# Patient Record
Sex: FEMALE | Race: BLACK OR AFRICAN AMERICAN | NOT HISPANIC OR LATINO | Employment: FULL TIME | URBAN - METROPOLITAN AREA
[De-identification: names, ages, dates, MRNs, and addresses within clinical notes are randomized per-mention and may not be internally consistent; named-entity substitution may affect disease eponyms.]

---

## 2017-01-12 ENCOUNTER — TELEPHONE (OUTPATIENT)
Dept: PHARMACY | Facility: CLINIC | Age: 47
End: 2017-01-12

## 2017-01-12 NOTE — TELEPHONE ENCOUNTER
Patient deferred QoL questionaiire due to time constraints  Orencia f/u complete. Name/ confirmed. Medication list reviewed. Consultation included: indication; goals of treatment; administration; storage and handling; side effects; how to handle side effects; the importance of compliance; how to handle missed doses; the importance of laboratory monitoring; the importance of keeping all follow up appointments. Patient understands to report any medication changes to [facility] and provider. All questions answered and addressed to patients satisfaction. Patient overall feels well however she did prefer the Humira. She still has some joint pain. Advised she is still relatively new to this medication and it can take some time for max benefit to be seen. She is having no side effects to note. She injects on .

## 2017-01-19 ENCOUNTER — PATIENT MESSAGE (OUTPATIENT)
Dept: RHEUMATOLOGY | Facility: CLINIC | Age: 47
End: 2017-01-19

## 2017-02-03 ENCOUNTER — OFFICE VISIT (OUTPATIENT)
Dept: RHEUMATOLOGY | Facility: CLINIC | Age: 47
End: 2017-02-03
Payer: COMMERCIAL

## 2017-02-03 VITALS
TEMPERATURE: 98 F | DIASTOLIC BLOOD PRESSURE: 92 MMHG | SYSTOLIC BLOOD PRESSURE: 142 MMHG | HEART RATE: 83 BPM | BODY MASS INDEX: 39.57 KG/M2 | HEIGHT: 63 IN | WEIGHT: 223.31 LBS

## 2017-02-03 DIAGNOSIS — D84.9 IMMUNOSUPPRESSION: ICD-10-CM

## 2017-02-03 DIAGNOSIS — R76.8 POSITIVE ANA (ANTINUCLEAR ANTIBODY): ICD-10-CM

## 2017-02-03 DIAGNOSIS — Z79.899 ENCOUNTER FOR LONG-TERM (CURRENT) USE OF OTHER MEDICATIONS: ICD-10-CM

## 2017-02-03 DIAGNOSIS — M05.79 RHEUMATOID ARTHRITIS INVOLVING MULTIPLE SITES WITH POSITIVE RHEUMATOID FACTOR: Primary | ICD-10-CM

## 2017-02-03 DIAGNOSIS — R53.83 FATIGUE, UNSPECIFIED TYPE: ICD-10-CM

## 2017-02-03 PROCEDURE — 3080F DIAST BP >= 90 MM HG: CPT | Mod: S$GLB,,, | Performed by: INTERNAL MEDICINE

## 2017-02-03 PROCEDURE — 99999 PR PBB SHADOW E&M-EST. PATIENT-LVL III: CPT | Mod: PBBFAC,,, | Performed by: INTERNAL MEDICINE

## 2017-02-03 PROCEDURE — 99214 OFFICE O/P EST MOD 30 MIN: CPT | Mod: S$GLB,,, | Performed by: INTERNAL MEDICINE

## 2017-02-03 PROCEDURE — 3077F SYST BP >= 140 MM HG: CPT | Mod: S$GLB,,, | Performed by: INTERNAL MEDICINE

## 2017-02-03 ASSESSMENT — ROUTINE ASSESSMENT OF PATIENT INDEX DATA (RAPID3)
PAIN SCORE: 2
MDHAQ FUNCTION SCORE: .1
PATIENT GLOBAL ASSESSMENT SCORE: .5
PSYCHOLOGICAL DISTRESS SCORE: 0
AM STIFFNESS SCORE: 0, NO
FATIGUE SCORE: 6
TOTAL RAPID3 SCORE: .94

## 2017-02-03 NOTE — PROGRESS NOTES
"Subjective:       Patient ID: Antonio Ruiz is a 46 y.o. female.    Chief Complaint: Rheumatoid Arthritis      HPI:  Antonio Ruiz is a 46 y.o. female with a history   of rheumatoid arthritis manifested by symmetrical small joint involvement,   history of elbow contracture on the right in the past, positive rheumatoid   factor, positive WILMA. Humira stopped due to rash and pain.  The patient currently takes Orencia, leflunomide 10 mg daily, prednisone 5 mg daily, sulfasalazine 1500 mg twice a day.       No pain today. Denies pain since stopping Humira and switching to Orencia she no longer has rash in ears and nodules or painful on the fingers.      Review of Systems   Constitutional: Positive for fatigue.   HENT: Negative.    Eyes: Negative.    Respiratory: Negative.    Cardiovascular: Negative.    Gastrointestinal: Negative.    Endocrine: Negative.    Genitourinary: Negative.    Musculoskeletal: Negative.    Skin: Negative.    Allergic/Immunologic: Negative.    Neurological: Negative.    Hematological: Negative.    Psychiatric/Behavioral: Negative.          Objective:     Visit Vitals    BP (!) 142/92 (BP Location: Right arm, Patient Position: Sitting, BP Method: Automatic)    Pulse 83    Temp 98.1 °F (36.7 °C) (Oral)    Ht 5' 3" (1.6 m)    Wt 101.3 kg (223 lb 4.8 oz)    BMI 39.56 kg/m2        Physical Exam   Constitutional: She is oriented to person, place, and time and well-developed, well-nourished, and in no distress.   HENT:   Head: Normocephalic and atraumatic.   Eyes: Conjunctivae and EOM are normal.   Neck: Neck supple.   Cardiovascular: Normal rate, regular rhythm and normal heart sounds.    Pulmonary/Chest: Effort normal and breath sounds normal.   Abdominal: Soft. Bowel sounds are normal.   Neurological: She is alert and oriented to person, place, and time. Gait normal.   Skin: Skin is warm and dry.     Hyperpigmentation on elbows   Psychiatric: Mood and affect normal. "   Musculoskeletal: She exhibits no edema, tenderness or deformity.   28 joint count: 0 swollen and 0 tender            Assessment:       1. RA.  Now improved off Humira and on Orencia, leflunomide and SSZ  2. Drug induced lupus.  Rash and Positive WILMA/SSA/ Smith  Plan:       1.  Labs   2.  Decrease prednisone 5 mg Sun/Tues/Thurs then stop.  3.  Continue Orencia, Leflunomide, SSZ.  4.  RTO 3 months/prn

## 2017-02-03 NOTE — MR AVS SNAPSHOT
[facility]  [address]  [facility] [address]  Phone: [phone]  Fax: [phone]                  Antonio Ruiz   2/3/2017 2:30 PM   Office Visit    Description:  Female : 1970   Provider:  Rosamaria Nicholson MD   Department:  [facility] - Rheumatology           Reason for Visit     Rheumatoid Arthritis           Diagnoses this Visit        Comments    Rheumatoid arthritis involving multiple sites with positive rheumatoid factor    -  Primary     Encounter for long-term (current) use of other medications         Fatigue, unspecified type         Immunosuppression         Positive WILMA (antinuclear antibody)                To Do List           Future Appointments        Provider Department Dept Phone    2017 2:30 PM Rosamaria Nicholson MD [facility] [phone]      Goals (5 Years of Data)     None      Follow-Up and Disposition     Return in about 3 months (around 5/3/2017).    Follow-up and Disposition History      [facility]     [facility] On Call Nurse [facility] Line -  Assistance  Registered nurses in the [facility] On Call Center provide clinical advisement, health education, appointment booking, and other advisory services.  Call for this free service at [phone].             Medications           Message regarding Medications     Verify the changes and/or additions to your medication regime listed below are the same as discussed with your clinician today.  If any of these changes or additions are incorrect, please notify your healthcare provider.             Verify that the below list of medications is an accurate representation of the medications you are currently taking.  If none reported, the list may be blank. If incorrect, please contact your healthcare provider. Carry this list with you in case of emergency.           Current Medications     abatacept (ORENCIA) 125 mg/mL Syrg Inject 125 mg into the skin every 7 days.     "amlodipine (NORVASC) 10 MG tablet Take 1 tablet (10 mg total) by mouth once daily.    ergocalciferol (ERGOCALCIFEROL) 50,000 unit Cap Take 1 capsule (50,000 Units total) by mouth every 7 days.    folic acid (FOLVITE) 1 MG tablet GIVE " 2 TABLETS BY MOUTH DAILY    ibuprofen (ADVIL,MOTRIN) 800 MG tablet TAKE 1 TABLET BY MOUTH THREE TIMES DAILY(EVERY 8 HOURS)    leflunomide (ARAVA) 10 MG Tab Take 1 tablet (10 mg total) by mouth once daily.    predniSONE (DELTASONE) 5 MG tablet TAKE 1 TO 2 TABLETS BY MOUTH DAILY AS NEEDED FOR RHEUMATOID ARTHRITIS FLARE** CALL OFFICE IF FLARE AS DIRECTED    sulfaSALAzine (AZULFIDINE) 500 mg Tab TAKE 3 TABLETS(1500 MG) BY MOUTH TWICE DAILY    triamcinolone acetonide 0.1% (KENALOG) 0.1 % cream AAA TWICE DAILY x 1-2 wks then prn flares only           Clinical Reference Information           Your Vitals Were     BP Pulse Temp Height Weight BMI    142/92 (BP Location: Right arm, Patient Position: Sitting, BP Method: Automatic) 83 98.1 °F (36.7 °C) (Oral) 5' 3" (1.6 m) 101.3 kg (223 lb 4.8 oz) 39.56 kg/m2      Blood Pressure          Most Recent Value    BP  (!)  142/92      Allergies as of 2/3/2017     Humira [Adalimumab]      Immunizations Administered on Date of Encounter - 2/3/2017     None      Orders Placed During Today's Visit     Future Labs/Procedures Expected by Expires    Anti-DNA antibody, double-stranded  2/3/2017 2/3/2018    C-reactive protein  2/3/2017 2/3/2018    C3 complement  2/3/2017 2/3/2018    C4 complement  2/3/2017 2/3/2018    CBC auto differential  2/3/2017 2/3/2018    Comprehensive metabolic panel  2/3/2017 2/3/2018    Sedimentation rate, manual  2/3/2017 2/3/2018    Urinalysis  2/3/2017 4/4/2018      Instructions    Decrease prednisone 5 mg Sun/Tues/Thurs then stop.       Language Assistance Services     ATTENTION: Language assistance services are available, free of charge. Please call [phone].      ATENCIÓN: Si habla español, tiene a landers disposición servicios " carmelos de asistencia lingüística. Asher garzon [phone].     CHAZ Ý: N?u b?n nói Ti?ng Vi?t, có các d?ch v? h? tr? ngôn ng? mi?n phí dành cho b?n. G?i s? [phone].         [facility] complies with applicable Federal civil rights laws and does not discriminate on the basis of race, color, national origin, age, disability, or sex.

## 2017-02-07 ENCOUNTER — TELEPHONE (OUTPATIENT)
Dept: PHARMACY | Facility: CLINIC | Age: 47
End: 2017-02-07

## 2017-02-14 ENCOUNTER — PATIENT MESSAGE (OUTPATIENT)
Dept: RHEUMATOLOGY | Facility: CLINIC | Age: 47
End: 2017-02-14

## 2017-02-14 DIAGNOSIS — M05.79 RHEUMATOID ARTHRITIS INVOLVING MULTIPLE SITES WITH POSITIVE RHEUMATOID FACTOR: ICD-10-CM

## 2017-02-15 DIAGNOSIS — M05.79 RHEUMATOID ARTHRITIS INVOLVING MULTIPLE SITES WITH POSITIVE RHEUMATOID FACTOR: ICD-10-CM

## 2017-02-15 RX ORDER — SULFASALAZINE 500 MG/1
TABLET ORAL
Qty: 180 TABLET | Refills: 0 | Status: SHIPPED | OUTPATIENT
Start: 2017-02-15 | End: 2017-04-10 | Stop reason: SDUPTHER

## 2017-02-16 RX ORDER — ABATACEPT 125 MG/ML
INJECTION, SOLUTION SUBCUTANEOUS
Qty: 4 ML | Refills: 2 | Status: SHIPPED | OUTPATIENT
Start: 2017-02-16 | End: 2017-05-10 | Stop reason: SDUPTHER

## 2017-02-21 ENCOUNTER — PATIENT MESSAGE (OUTPATIENT)
Dept: RHEUMATOLOGY | Facility: CLINIC | Age: 47
End: 2017-02-21

## 2017-02-21 DIAGNOSIS — M05.79 RHEUMATOID ARTHRITIS INVOLVING MULTIPLE SITES WITH POSITIVE RHEUMATOID FACTOR: ICD-10-CM

## 2017-02-22 RX ORDER — LEFLUNOMIDE 10 MG/1
10 TABLET ORAL DAILY
Qty: 30 TABLET | Refills: 2 | Status: SHIPPED | OUTPATIENT
Start: 2017-02-22 | End: 2017-05-29 | Stop reason: SDUPTHER

## 2017-03-10 ENCOUNTER — PATIENT MESSAGE (OUTPATIENT)
Dept: RHEUMATOLOGY | Facility: CLINIC | Age: 47
End: 2017-03-10

## 2017-03-13 ENCOUNTER — PATIENT MESSAGE (OUTPATIENT)
Dept: ADMINISTRATIVE | Facility: HOSPITAL | Age: 47
End: 2017-03-13

## 2017-03-14 ENCOUNTER — TELEPHONE (OUTPATIENT)
Dept: PHARMACY | Facility: CLINIC | Age: 47
End: 2017-03-14

## 2017-03-15 ENCOUNTER — PATIENT MESSAGE (OUTPATIENT)
Dept: RHEUMATOLOGY | Facility: CLINIC | Age: 47
End: 2017-03-15

## 2017-03-16 ENCOUNTER — TELEPHONE (OUTPATIENT)
Dept: PHARMACY | Facility: CLINIC | Age: 47
End: 2017-03-16

## 2017-03-17 ENCOUNTER — DOCUMENTATION ONLY (OUTPATIENT)
Dept: INTERNAL MEDICINE | Facility: CLINIC | Age: 47
End: 2017-03-17

## 2017-03-27 RX ORDER — AMLODIPINE BESYLATE 10 MG/1
TABLET ORAL
Qty: 90 TABLET | Refills: 0 | Status: SHIPPED | OUTPATIENT
Start: 2017-03-27 | End: 2017-06-01 | Stop reason: SDUPTHER

## 2017-03-27 RX ORDER — AMLODIPINE BESYLATE 10 MG/1
TABLET ORAL
Qty: 90 TABLET | Refills: 1 | Status: SHIPPED | OUTPATIENT
Start: 2017-03-27 | End: 2017-10-20

## 2017-04-10 DIAGNOSIS — M05.79 RHEUMATOID ARTHRITIS INVOLVING MULTIPLE SITES WITH POSITIVE RHEUMATOID FACTOR: ICD-10-CM

## 2017-04-10 RX ORDER — SULFASALAZINE 500 MG/1
TABLET ORAL
Qty: 180 TABLET | Refills: 0 | Status: SHIPPED | OUTPATIENT
Start: 2017-04-10 | End: 2017-06-08 | Stop reason: SDUPTHER

## 2017-04-12 ENCOUNTER — TELEPHONE (OUTPATIENT)
Dept: PHARMACY | Facility: CLINIC | Age: 47
End: 2017-04-12

## 2017-04-12 NOTE — TELEPHONE ENCOUNTER
Orencia f/u complete. Name/ confirmed. Medication list reviewed and updated. Consultation included: indication; goals of treatment; administration; storage and handling; side effects; how to handle side effects; the importance of compliance; how to handle missed doses; the importance of laboratory monitoring; the importance of keeping all follow up appointments. Patient understands to report any medication changes to [facility] and provider. All questions answered and addressed to patients satisfaction. Patient overall feels well however she does notice that the medication wears off a little quicker than Ji did. She injects on Sundays and she does feel about  that her pain levels increase. She cannot pinpoint a particular afflicted joint--sometimes shoulders sometimes hands, etc. SHe has no questions regarding the self injection process. She reports strict compliance and has missed no doses. No side effects no med changes. Will f/u in 3mos to reassess symptoms and medication efficacy.

## 2017-04-19 ENCOUNTER — PATIENT MESSAGE (OUTPATIENT)
Dept: RHEUMATOLOGY | Facility: CLINIC | Age: 47
End: 2017-04-19

## 2017-04-19 DIAGNOSIS — M05.79 RHEUMATOID ARTHRITIS INVOLVING MULTIPLE SITES WITH POSITIVE RHEUMATOID FACTOR: ICD-10-CM

## 2017-04-19 RX ORDER — PREDNISONE 5 MG/1
TABLET ORAL
Qty: 60 TABLET | Refills: 0 | Status: SHIPPED | OUTPATIENT
Start: 2017-04-19 | End: 2017-05-18 | Stop reason: SDUPTHER

## 2017-04-26 ENCOUNTER — PATIENT MESSAGE (OUTPATIENT)
Dept: RHEUMATOLOGY | Facility: CLINIC | Age: 47
End: 2017-04-26

## 2017-05-01 ENCOUNTER — PATIENT MESSAGE (OUTPATIENT)
Dept: RHEUMATOLOGY | Facility: CLINIC | Age: 47
End: 2017-05-01

## 2017-05-01 DIAGNOSIS — R53.83 FATIGUE, UNSPECIFIED TYPE: ICD-10-CM

## 2017-05-01 DIAGNOSIS — L93.2 DRUG-INDUCED LUPUS ERYTHEMATOSUS: ICD-10-CM

## 2017-05-01 DIAGNOSIS — T50.905A DRUG-INDUCED LUPUS ERYTHEMATOSUS: ICD-10-CM

## 2017-05-01 DIAGNOSIS — D84.9 IMMUNOSUPPRESSION: ICD-10-CM

## 2017-05-01 DIAGNOSIS — E55.9 VITAMIN D DEFICIENCY DISEASE: ICD-10-CM

## 2017-05-01 DIAGNOSIS — M05.79 RHEUMATOID ARTHRITIS INVOLVING MULTIPLE SITES WITH POSITIVE RHEUMATOID FACTOR: Primary | ICD-10-CM

## 2017-05-01 NOTE — TELEPHONE ENCOUNTER
Orencia did not work when she injected in abdomen and it did not help.  Also now with bald spot.  She did not increase steroid.  Restart Orencia injecting legs.  Work on switch to Xeljanz (after labs) since Orencia not adequately controlling symptoms and she has to take prednisone.    Currently she is taking prednisone 10 mg, Orencia, leuflunomide and SSZ.  Will repeat labs with cholesterol and quatiferon TB since will need this (to be done outside [facility] due to insurance).  Will mail lab orders.

## 2017-05-04 ENCOUNTER — PATIENT MESSAGE (OUTPATIENT)
Dept: RHEUMATOLOGY | Facility: CLINIC | Age: 47
End: 2017-05-04

## 2017-05-10 DIAGNOSIS — M05.79 RHEUMATOID ARTHRITIS INVOLVING MULTIPLE SITES WITH POSITIVE RHEUMATOID FACTOR: ICD-10-CM

## 2017-05-11 RX ORDER — ABATACEPT 125 MG/ML
INJECTION, SOLUTION SUBCUTANEOUS
Qty: 4 ML | Refills: 1 | Status: SHIPPED | OUTPATIENT
Start: 2017-05-11 | End: 2017-08-31 | Stop reason: ALTCHOICE

## 2017-05-18 ENCOUNTER — OFFICE VISIT (OUTPATIENT)
Dept: RHEUMATOLOGY | Facility: CLINIC | Age: 47
End: 2017-05-18
Payer: COMMERCIAL

## 2017-05-18 VITALS
BODY MASS INDEX: 40.49 KG/M2 | HEART RATE: 73 BPM | SYSTOLIC BLOOD PRESSURE: 151 MMHG | DIASTOLIC BLOOD PRESSURE: 96 MMHG | WEIGHT: 228.63 LBS | TEMPERATURE: 98 F

## 2017-05-18 DIAGNOSIS — T50.905A DRUG-INDUCED LUPUS ERYTHEMATOSUS: ICD-10-CM

## 2017-05-18 DIAGNOSIS — E66.01 MORBID OBESITY, UNSPECIFIED OBESITY TYPE: ICD-10-CM

## 2017-05-18 DIAGNOSIS — L65.9 ALOPECIA: ICD-10-CM

## 2017-05-18 DIAGNOSIS — L93.2 DRUG-INDUCED LUPUS ERYTHEMATOSUS: ICD-10-CM

## 2017-05-18 DIAGNOSIS — M05.79 RHEUMATOID ARTHRITIS INVOLVING MULTIPLE SITES WITH POSITIVE RHEUMATOID FACTOR: Primary | ICD-10-CM

## 2017-05-18 DIAGNOSIS — R53.83 FATIGUE, UNSPECIFIED TYPE: ICD-10-CM

## 2017-05-18 DIAGNOSIS — D84.9 IMMUNOSUPPRESSION: ICD-10-CM

## 2017-05-18 PROCEDURE — 3077F SYST BP >= 140 MM HG: CPT | Mod: S$GLB,,, | Performed by: INTERNAL MEDICINE

## 2017-05-18 PROCEDURE — 1160F RVW MEDS BY RX/DR IN RCRD: CPT | Mod: S$GLB,,, | Performed by: INTERNAL MEDICINE

## 2017-05-18 PROCEDURE — 3080F DIAST BP >= 90 MM HG: CPT | Mod: S$GLB,,, | Performed by: INTERNAL MEDICINE

## 2017-05-18 PROCEDURE — 99214 OFFICE O/P EST MOD 30 MIN: CPT | Mod: S$GLB,,, | Performed by: INTERNAL MEDICINE

## 2017-05-18 PROCEDURE — 99999 PR PBB SHADOW E&M-EST. PATIENT-LVL III: CPT | Mod: PBBFAC,,, | Performed by: INTERNAL MEDICINE

## 2017-05-18 RX ORDER — PREDNISONE 5 MG/1
TABLET ORAL
Qty: 60 TABLET | Refills: 1 | Status: SHIPPED | OUTPATIENT
Start: 2017-05-18 | End: 2017-07-18 | Stop reason: SDUPTHER

## 2017-05-18 ASSESSMENT — ROUTINE ASSESSMENT OF PATIENT INDEX DATA (RAPID3)
AM STIFFNESS SCORE: 0, NO
MDHAQ FUNCTION SCORE: .3
PATIENT GLOBAL ASSESSMENT SCORE: 2
PSYCHOLOGICAL DISTRESS SCORE: 0
PAIN SCORE: 3
TOTAL RAPID3 SCORE: 2
FATIGUE SCORE: 6

## 2017-05-18 NOTE — MR AVS SNAPSHOT
[facility]  [address] [facility] [address]  Phone: [phone]  Fax: [phone]                  Antonio Ruiz   2017 2:30 PM   Office Visit    Description:  Female : 1970   Provider:  Rosamaria Nicholson MD   Department:  [facility]           Reason for Visit     Rheumatoid Arthritis           Diagnoses this Visit        Comments    Rheumatoid arthritis involving multiple sites with positive rheumatoid factor    -  Primary     Drug-induced lupus erythematosus         Immunosuppression         Fatigue, unspecified type         Alopecia         Morbid obesity, unspecified obesity type                To Do List           Future Appointments        Provider Department Dept Phone    2017 2:30 PM Rosamaria Nicholson MD [facility] [phone]      Goals (5 Years of Data)     None      Follow-Up and Disposition     Return in about 3 months (around 2017).    Follow-up and Disposition History       These Medications        Disp Refills Start End    predniSONE (DELTASONE) 5 MG tablet 60 tablet 1 2017     TAKE 1 TO 2 TABLETS BY MOUTH DAILY AS NEEDED FOR RHEUMATOID ARTHRITIS FLARE** CALL OFFICE IF FLARE AS DIRECTED    Pharmacy: [facility] [address] [place] [address] [address] [facility] Ph #: [phone]         [facility]     [facility] Nurse Care Line -  Assistance  Unless otherwise directed by your provider, please contact [facility], our nurse care line that is available for  assistance.     Registered nurses in the [facility] On Call Center provide: appointment scheduling, clinical advisement, health education, and other advisory services.  Call: [phone] (toll free)               Medications           Message regarding Medications     Verify the changes and/or additions to your medication regime listed below are the same as discussed with your  clinician today.  If any of these changes or additions are incorrect, please notify your healthcare provider.             Verify that the below list of medications is an accurate representation of the medications you are currently taking.  If none reported, the list may be blank. If incorrect, please contact your healthcare provider. Carry this list with you in case of emergency.           Current Medications     amlodipine (NORVASC) 10 MG tablet TAKE 1 TABLET(10 MG) BY MOUTH EVERY DAY    amlodipine (NORVASC) 10 MG tablet TAKE 1 TABLET(10 MG) BY MOUTH EVERY DAY    ergocalciferol (ERGOCALCIFEROL) 50,000 unit Cap Take 1 capsule (50,000 Units total) by mouth every 7 days.    folic acid (FOLVITE) 1 MG tablet GIVE " 2 TABLETS BY MOUTH DAILY    ibuprofen (ADVIL,MOTRIN) 800 MG tablet TAKE 1 TABLET BY MOUTH THREE TIMES DAILY(EVERY 8 HOURS)    leflunomide (ARAVA) 10 MG Tab Take 1 tablet (10 mg total) by mouth once daily.    ORENCIA 125 mg/mL Syrg INJECT 125MG INTO THE SKIN EVERY 7 DAYS    predniSONE (DELTASONE) 5 MG tablet TAKE 1 TO 2 TABLETS BY MOUTH DAILY AS NEEDED FOR RHEUMATOID ARTHRITIS FLARE** CALL OFFICE IF FLARE AS DIRECTED    sulfaSALAzine (AZULFIDINE) 500 mg Tab TAKE 3 TABLETS(1500 MG) BY MOUTH TWICE DAILY    triamcinolone acetonide 0.1% (KENALOG) 0.1 % cream AAA TWICE DAILY x 1-2 wks then prn flares only           Clinical Reference Information           Your Vitals Were     BP Pulse Temp Weight Last Period BMI    151/96 (BP Location: Left arm, Patient Position: Sitting, BP Method: Automatic) 73 98.4 °F (36.9 °C) (Oral) 103.7 kg (228 lb 9.6 oz) 05/12/2017 40.49 kg/m2      Blood Pressure          Most Recent Value    BP  (!)  151/96      Allergies as of 5/18/2017     Humira [Adalimumab]      Immunizations Administered on Date of Encounter - 5/18/2017     None      Orders Placed During Today's Visit     Future Labs/Procedures Expected by Expires    Anti-DNA antibody, double-stranded  5/18/2017 5/18/2018     C-reactive protein  5/18/2017 5/18/2018    C3 complement  5/18/2017 5/18/2018    C4 complement  5/18/2017 5/18/2018    CBC auto differential  5/18/2017 5/18/2018    Comprehensive metabolic panel  5/18/2017 5/18/2018    Protein / creatinine ratio, urine  5/18/2017 7/17/2018    Sedimentation rate, manual  5/18/2017 5/18/2018    Urinalysis  5/18/2017 7/17/2018      Language Assistance Services     ATTENTION: Language assistance services are available, free of charge. Please call [phone].      ATENCIÓN: Si habla español, tiene a landers disposición servicios gratuitos de asistencia lingüística. Llame al [phone].     CHÚ Ý: N?u b?n nói Ti?ng Vi?t, có các d?ch v? h? tr? ngôn ng? mi?n phí dành cho b?n. G?i s? [phone].         [facility] complies with applicable Federal civil rights laws and does not discriminate on the basis of race, color, national origin, age, disability, or sex.

## 2017-05-18 NOTE — PROGRESS NOTES
Subjective:       Patient ID: Antonio Ruiz is a 47 y.o. female.    Chief Complaint: Rheumatoid Arthritis      HPI:  Antonio Ruiz is a 47 y.o. female with a history   of rheumatoid arthritis manifested by symmetrical small joint involvement,   history of elbow contracture on the right in the past, positive rheumatoid   factor, positive WILMA. Humira stopped due to rash and pain.  The patient currently takes Orencia, leflunomide 10 mg daily, prednisone 5 mg daily, sulfasalazine 1500 mg twice a day (misses in evening).       No pain today.   She is on prednisone 10 mg daily.  She had pain increase pain 2-3 days after taking Orencia.   She has been losing hair.      Review of Systems   Constitutional: Positive for fatigue.   HENT: Negative.    Eyes: Negative.    Respiratory: Negative.    Cardiovascular: Negative.    Gastrointestinal: Negative.    Endocrine: Negative.    Genitourinary: Negative.    Musculoskeletal: Positive for arthralgias.   Skin: Positive for rash.   Allergic/Immunologic: Negative.    Neurological: Negative.    Hematological: Negative.    Psychiatric/Behavioral: Negative.          Objective:   BP (!) 151/96 (BP Location: Left arm, Patient Position: Sitting, BP Method: Automatic)  Pulse 73  Temp 98.4 °F (36.9 °C) (Oral)   Wt 103.7 kg (228 lb 9.6 oz)  LMP 05/12/2017  BMI 40.49 kg/m2     Physical Exam   Constitutional: She is oriented to person, place, and time and well-developed, well-nourished, and in no distress.   HENT:   Head: Normocephalic and atraumatic.   Eyes: Conjunctivae and EOM are normal.   Neck: Neck supple.   Cardiovascular: Normal rate, regular rhythm and normal heart sounds.    Pulmonary/Chest: Effort normal and breath sounds normal.   Abdominal: Soft. Bowel sounds are normal.   Neurological: She is alert and oriented to person, place, and time. Gait normal.   Skin: Skin is warm and dry.     Hair loss left front  Hyperpigmentation of elbows and in ears   Psychiatric: Mood  and affect normal.   Musculoskeletal: She exhibits no edema, tenderness or deformity.   28 joint count: 0 swollen and 0 tender            LABS  5/6/17 ESR 20, CRP3  Assessment:       1. RA. Now off Orencia due to pain.  She is on prednisone 10 mg, leflunomide and SSZ.  She has positive  2. Drug induced lupus. Rash and Positive WILMA/SSA/ Matthew.  Now with +DS DNA and normal complements.  Repeat labs in one month.   3. Alopecia  4. Increase ETOH intake.  Due to stress and joint pain.  1-2 drinks a day.  She does not feel like she should cut back.      Plan:       1. Decrease prednisone 5 mg daily.  2. Continue leflunomide and SSZ  3. Hold Orencia since caused more pain 2-3 days after shot.  4. Labs in one month.  5. Patient to follow with dermatology regarding alopecia and rash. Patient not interested in biopsy to evaluate for lupus since she keloids.  6. Consider retrying Plaquenil in efforts to prevent progression.  In past had thinning of hair with Plaquenil.  7. Rituximab hand out given  RTO 3 months/prn

## 2017-05-28 ENCOUNTER — PATIENT MESSAGE (OUTPATIENT)
Dept: RHEUMATOLOGY | Facility: CLINIC | Age: 47
End: 2017-05-28

## 2017-05-28 ENCOUNTER — PATIENT MESSAGE (OUTPATIENT)
Dept: DERMATOLOGY | Facility: CLINIC | Age: 47
End: 2017-05-28

## 2017-05-28 DIAGNOSIS — M05.79 RHEUMATOID ARTHRITIS INVOLVING MULTIPLE SITES WITH POSITIVE RHEUMATOID FACTOR: ICD-10-CM

## 2017-05-29 DIAGNOSIS — M05.79 RHEUMATOID ARTHRITIS INVOLVING MULTIPLE SITES WITH POSITIVE RHEUMATOID FACTOR: ICD-10-CM

## 2017-05-29 RX ORDER — LEFLUNOMIDE 10 MG/1
TABLET ORAL
Qty: 30 TABLET | Refills: 0 | OUTPATIENT
Start: 2017-05-29

## 2017-05-29 RX ORDER — LEFLUNOMIDE 10 MG/1
TABLET ORAL
Qty: 30 TABLET | Refills: 0 | Status: SHIPPED | OUTPATIENT
Start: 2017-05-29 | End: 2017-06-29 | Stop reason: SDUPTHER

## 2017-06-01 ENCOUNTER — OFFICE VISIT (OUTPATIENT)
Dept: DERMATOLOGY | Facility: CLINIC | Age: 47
End: 2017-06-01
Payer: COMMERCIAL

## 2017-06-01 DIAGNOSIS — L30.9 DERMATITIS: Primary | ICD-10-CM

## 2017-06-01 DIAGNOSIS — L65.9 ALOPECIA, UNSPECIFIED: ICD-10-CM

## 2017-06-01 PROCEDURE — 99214 OFFICE O/P EST MOD 30 MIN: CPT | Mod: 25,S$GLB,, | Performed by: DERMATOLOGY

## 2017-06-01 PROCEDURE — 88305 TISSUE EXAM BY PATHOLOGIST: CPT | Performed by: PATHOLOGY

## 2017-06-01 PROCEDURE — 11100 PR BIOPSY OF SKIN LESION: CPT | Mod: S$GLB,,, | Performed by: DERMATOLOGY

## 2017-06-01 PROCEDURE — 99999 PR PBB SHADOW E&M-EST. PATIENT-LVL II: CPT | Mod: PBBFAC,,, | Performed by: DERMATOLOGY

## 2017-06-01 RX ORDER — FLUOCINONIDE TOPICAL SOLUTION USP, 0.05% 0.5 MG/ML
SOLUTION TOPICAL
Qty: 60 ML | Refills: 3 | Status: SHIPPED | OUTPATIENT
Start: 2017-06-01 | End: 2019-04-25

## 2017-06-01 RX ORDER — KETOCONAZOLE 20 MG/ML
SHAMPOO, SUSPENSION TOPICAL
Qty: 120 ML | Refills: 5 | Status: SHIPPED | OUTPATIENT
Start: 2017-06-01 | End: 2019-04-25

## 2017-06-01 NOTE — PROGRESS NOTES
Subjective:       Patient ID:  Antonio Ruiz is a 47 y.o. female who presents for   Chief Complaint   Patient presents with    Rash     poss. lupus     Pt presents for consult from rheum for poss. Cutaneous Lupus- she was referred by dr tristan.    Has hair loss left temple area x at least one month.  Also has rashes on bilateral hips, arms and back.  X unsure. Itchy.  tx with nothing,   Pt does have hx of keloid scarring so she is concerned about a bx.    Pt has hx of rash /lupus like reaction to humira.  She has been off orencia and is now on sulfasalazine.   Heat makes lesions more sensitive, mildly itchy.       Rash         Review of Systems   Constitutional: Positive for fatigue. Negative for fever, chills and malaise.   HENT: Negative for mouth sores.    Musculoskeletal: Negative for joint swelling and arthralgias.   Skin: Positive for rash and daily sunscreen use.        Objective:    Physical Exam   Constitutional: She appears well-developed and well-nourished. No distress.   Neurological: She is alert and oriented to person, place, and time. She is not disoriented.   Psychiatric: She has a normal mood and affect.   Skin:   Areas Examined (abnormalities noted in diagram):   Scalp / Hair Palpated and Inspected  Head / Face Inspection Performed  Neck Inspection Performed  Chest / Axilla Inspection Performed  Abdomen Inspection Performed  Back Inspection Performed  RUE Inspected  LUE Inspection Performed  Nails and Digits Inspection Performed                   Diagram Legend     Erythematous scaling macule/papule c/w actinic keratosis       Vascular papule c/w angioma      Pigmented verrucoid papule/plaque c/w seborrheic keratosis      Yellow umbilicated papule c/w sebaceous hyperplasia      Irregularly shaped tan macule c/w lentigo     1-2 mm smooth white papules consistent with Milia      Movable subcutaneous cyst with punctum c/w epidermal inclusion cyst      Subcutaneous movable cyst c/w pilar cyst       Firm pink to brown papule c/w dermatofibroma      Pedunculated fleshy papule(s) c/w skin tag(s)      Evenly pigmented macule c/w junctional nevus     Mildly variegated pigmented, slightly irregular-bordered macule c/w mildly atypical nevus      Flesh colored to evenly pigmented papule c/w intradermal nevus       Pink pearly papule/plaque c/w basal cell carcinoma      Erythematous hyperkeratotic cursted plaque c/w SCC      Surgical scar with no sign of skin cancer recurrence      Open and closed comedones      Inflammatory papules and pustules      Verrucoid papule consistent consistent with wart     Erythematous eczematous patches and plaques     Dystrophic onycholytic nail with subungual debris c/w onychomycosis     Umbilicated papule    Erythematous-base heme-crusted tan verrucoid plaque consistent with inflamed seborrheic keratosis     Erythematous Silvery Scaling Plaque c/w Psoriasis     See annotation      Assessment / Plan:      Pathology Orders:      Normal Orders This Visit    Tissue Specimen To Pathology, Dermatology     Questions:    Directional Terms:  Other(comment)    Clinical information:  r/o lupus    Specific Site:  Right  hip    Tissue Specimen To Pathology, Dermatology     Comments:    Please do direct immunoflourescence    Questions:    Directional Terms:  Other(comment)    Clinical information:  r/o lupus- DIRECT IMMUNOFLOURESCENCE    Specific Site:  Left  hip Comment - please do direct immunofloresence        Dermatitis  Punch biopsy procedure note: x 2, h and e and DIF  Punch biopsy performed after verbal consent obtained. Area marked and prepped with alcohol. Approximately 1cc of 1% lidocaine with epinephrine injected. 4 mm disposable punch used to remove lesion. Hemostasis obtained and biopsy site closed with 1 - 2 Prolene sutures. Wound care instructions reviewed with patient and handout given.    -     Tissue Specimen To Pathology, Dermatology  -     Tissue Specimen To Pathology,  Dermatology  Triamcinolone 0.1% ointment bid     Alopecia, unspecified  -     fluocinonide (LIDEX) 0.05 % external solution; AAA scalp qd - bid prn pruritus  Dispense: 60 mL; Refill: 3  -     ketoconazole (NIZORAL) 2 % shampoo; Wash hair with medicated shampoo at least 2x/week - let sit on scalp at least 5 minutes prior to rinsing  Dispense: 120 mL; Refill: 5  Qilib   Consider ILK            Return in about 2 weeks (around 6/15/2017).

## 2017-06-01 NOTE — LETTER
June 1, 2017      Rosamaria Nicholson MD  [address]  [place] [address]           [place] [facility]atology  [address] [facility]  [place] [address]  Phone: [phone]  Fax: [phone]          Patient: Antonio Ruiz   MR Number: [id number]   YOB: 1970   Date of Visit: 6/1/2017       Dear Dr. Rosamaria Nicholson:    Thank you for referring Antonio Ruiz to me for evaluation. Attached you will find relevant portions of my assessment and plan of care.    If you have questions, please do not hesitate to call me. I look forward to following Antonio Ruiz along with you.    Sincerely,    Sharri Maxwell MD    Enclosure  CC:  No Recipients    If you would like to receive this communication electronically, please contact [email or web address] or [phone] to request more information on [employer] access.    For providers and/or their staff who would like to refer a patient to [facility], please contact us through our one-stop-shop provider referral line, [facility] [place], at [phone].    If you feel you have received this communication in error or would no longer like to receive these types of communications, please e-mail [email or web address]

## 2017-06-07 ENCOUNTER — PATIENT MESSAGE (OUTPATIENT)
Dept: RHEUMATOLOGY | Facility: CLINIC | Age: 47
End: 2017-06-07

## 2017-06-07 DIAGNOSIS — M05.79 RHEUMATOID ARTHRITIS INVOLVING MULTIPLE SITES WITH POSITIVE RHEUMATOID FACTOR: ICD-10-CM

## 2017-06-08 DIAGNOSIS — N76.0 ACUTE VAGINITIS: ICD-10-CM

## 2017-06-08 RX ORDER — FLUCONAZOLE 150 MG/1
TABLET ORAL
Qty: 1 TABLET | Refills: 0 | Status: SHIPPED | OUTPATIENT
Start: 2017-06-08 | End: 2017-12-11

## 2017-06-08 RX ORDER — SULFASALAZINE 500 MG/1
TABLET ORAL
Qty: 180 TABLET | Refills: 2 | Status: SHIPPED | OUTPATIENT
Start: 2017-06-08 | End: 2017-10-16 | Stop reason: SDUPTHER

## 2017-06-15 ENCOUNTER — OFFICE VISIT (OUTPATIENT)
Dept: DERMATOLOGY | Facility: CLINIC | Age: 47
End: 2017-06-15
Payer: COMMERCIAL

## 2017-06-15 VITALS — BODY MASS INDEX: 40.39 KG/M2 | WEIGHT: 228 LBS

## 2017-06-15 DIAGNOSIS — L93.2 CUTANEOUS LUPUS ERYTHEMATOSUS: Primary | ICD-10-CM

## 2017-06-15 PROCEDURE — 99999 PR PBB SHADOW E&M-EST. PATIENT-LVL II: CPT | Mod: PBBFAC,,, | Performed by: DERMATOLOGY

## 2017-06-15 PROCEDURE — 99213 OFFICE O/P EST LOW 20 MIN: CPT | Mod: S$GLB,,, | Performed by: DERMATOLOGY

## 2017-06-15 RX ORDER — CLOBETASOL PROPIONATE 0.5 MG/G
AEROSOL, FOAM TOPICAL 2 TIMES DAILY
Qty: 50 G | Refills: 1 | Status: SHIPPED | OUTPATIENT
Start: 2017-06-15 | End: 2019-04-25

## 2017-06-15 NOTE — PROGRESS NOTES
Subjective:       Patient ID:  Antonio Ruiz is a 47 y.o. female who presents for   Chief Complaint   Patient presents with    Follow-up     Pt here for f/u on rash. Feels she has a new area on right arm and more lesions on B hips.  Right arm itches, hips asymptomatic.    No mouth ulcers  Stopped MTX bc switched to humira.    Pt was on humira and got a lupus like syndrome. Has been on arava , prednisone 5 mg daily, sulfasalazine,  Stopped Orencia bc felt worse on it.    Was on plaquenil and felt her hair was falling out.          Review of Systems   Constitutional: Negative for fever and chills.   HENT: Negative for mouth sores.    Musculoskeletal: Negative for joint swelling and arthralgias.   Skin: Positive for itching and rash.        Objective:    Physical Exam   Constitutional: She appears well-developed and well-nourished. No distress.   Neurological: She is alert and oriented to person, place, and time. She is not disoriented.   Psychiatric: She has a normal mood and affect.   Skin:   Areas Examined (abnormalities noted in diagram):   RUE Inspected  LUE Inspection Performed  RLE Inspected  LLE Inspection Performed              Diagram Legend     Erythematous scaling macule/papule c/w actinic keratosis       Vascular papule c/w angioma      Pigmented verrucoid papule/plaque c/w seborrheic keratosis      Yellow umbilicated papule c/w sebaceous hyperplasia      Irregularly shaped tan macule c/w lentigo     1-2 mm smooth white papules consistent with Milia      Movable subcutaneous cyst with punctum c/w epidermal inclusion cyst      Subcutaneous movable cyst c/w pilar cyst      Firm pink to brown papule c/w dermatofibroma      Pedunculated fleshy papule(s) c/w skin tag(s)      Evenly pigmented macule c/w junctional nevus     Mildly variegated pigmented, slightly irregular-bordered macule c/w mildly atypical nevus      Flesh colored to evenly pigmented papule c/w intradermal nevus       Pink pearly  papule/plaque c/w basal cell carcinoma      Erythematous hyperkeratotic cursted plaque c/w SCC      Surgical scar with no sign of skin cancer recurrence      Open and closed comedones      Inflammatory papules and pustules      Verrucoid papule consistent consistent with wart     Erythematous eczematous patches and plaques     Dystrophic onycholytic nail with subungual debris c/w onychomycosis     Umbilicated papule    Erythematous-base heme-crusted tan verrucoid plaque consistent with inflamed seborrheic keratosis     Erythematous Silvery Scaling Plaque c/w Psoriasis     See annotation      Assessment / Plan:        Cutaneous lupus erythematosus  IIF negative however H and E consistent with CTD.   Pt was on plaquenil in the past and had hair loss, however this could have been just related to her underlying condition not the medication  She will discuss consideration of restarting with Rheum  Will use potent topical steroids and see if this helps  Sun protection   Consider protopic as steroid sparing agent  Pt educated that overuse of steroids can lead to skin thinning/atrophy, hypopigmentation, striae.    Other orders  -     clobetasol (OLUX) 0.05 % Foam; Apply topically 2 (two) times daily. aaa bid prn flare.avoid use on face or groin  Dispense: 50 g; Refill: 1             Return in about 2 months (around 8/15/2017).

## 2017-06-16 ENCOUNTER — TELEPHONE (OUTPATIENT)
Dept: RHEUMATOLOGY | Facility: CLINIC | Age: 47
End: 2017-06-16

## 2017-06-16 NOTE — TELEPHONE ENCOUNTER
----- Message from Huma Maxwell MD sent at 6/8/2017 11:48 AM CDT -----  See path report. Thanks, huma

## 2017-06-16 NOTE — TELEPHONE ENCOUNTER
Path shows connective tissue disease dermatomyositis or lupus but the immunofluorescence did not confirm lupus.  Will avoid TNF inhibitors due to concern for drug induced lupus. Dermatology agrees with Plaquenil.  Patient agreed to retry.  She will get Plaquenil eye exam with her doctor outside [facility] once things settle since her father  recently.  She is currently on prednisone 10 mg, leflunomide and SSZ and feels well.  Will continue current regimen.

## 2017-06-28 ENCOUNTER — PATIENT MESSAGE (OUTPATIENT)
Dept: RHEUMATOLOGY | Facility: CLINIC | Age: 47
End: 2017-06-28

## 2017-06-28 DIAGNOSIS — L93.2 DRUG-INDUCED LUPUS ERYTHEMATOSUS: ICD-10-CM

## 2017-06-28 DIAGNOSIS — M05.79 RHEUMATOID ARTHRITIS INVOLVING MULTIPLE SITES WITH POSITIVE RHEUMATOID FACTOR: Primary | ICD-10-CM

## 2017-06-28 DIAGNOSIS — T50.905A DRUG-INDUCED LUPUS ERYTHEMATOSUS: ICD-10-CM

## 2017-06-28 DIAGNOSIS — Z79.899 ENCOUNTER FOR LONG-TERM (CURRENT) USE OF OTHER MEDICATIONS: ICD-10-CM

## 2017-06-28 DIAGNOSIS — R76.8 POSITIVE ANA (ANTINUCLEAR ANTIBODY): ICD-10-CM

## 2017-06-29 ENCOUNTER — PATIENT MESSAGE (OUTPATIENT)
Dept: RHEUMATOLOGY | Facility: CLINIC | Age: 47
End: 2017-06-29

## 2017-06-29 DIAGNOSIS — M05.79 RHEUMATOID ARTHRITIS INVOLVING MULTIPLE SITES WITH POSITIVE RHEUMATOID FACTOR: ICD-10-CM

## 2017-06-29 RX ORDER — LEFLUNOMIDE 10 MG/1
TABLET ORAL
Qty: 30 TABLET | Refills: 0 | OUTPATIENT
Start: 2017-06-29

## 2017-06-29 RX ORDER — LEFLUNOMIDE 10 MG/1
TABLET ORAL
Qty: 30 TABLET | Refills: 0 | Status: SHIPPED | OUTPATIENT
Start: 2017-06-29 | End: 2017-07-18 | Stop reason: SDUPTHER

## 2017-06-29 RX ORDER — AMLODIPINE BESYLATE 10 MG/1
TABLET ORAL
Qty: 90 TABLET | Refills: 2 | Status: SHIPPED | OUTPATIENT
Start: 2017-06-29 | End: 2017-09-22

## 2017-06-30 ENCOUNTER — PATIENT MESSAGE (OUTPATIENT)
Dept: RHEUMATOLOGY | Facility: CLINIC | Age: 47
End: 2017-06-30

## 2017-07-05 ENCOUNTER — PATIENT MESSAGE (OUTPATIENT)
Dept: RHEUMATOLOGY | Facility: CLINIC | Age: 47
End: 2017-07-05

## 2017-07-05 RX ORDER — HYDROXYCHLOROQUINE SULFATE 200 MG/1
200 TABLET, FILM COATED ORAL 2 TIMES DAILY
Qty: 60 TABLET | Refills: 2 | Status: SHIPPED | OUTPATIENT
Start: 2017-07-05 | End: 2017-08-04

## 2017-07-18 ENCOUNTER — PATIENT MESSAGE (OUTPATIENT)
Dept: RHEUMATOLOGY | Facility: CLINIC | Age: 47
End: 2017-07-18

## 2017-07-18 DIAGNOSIS — M05.79 RHEUMATOID ARTHRITIS INVOLVING MULTIPLE SITES WITH POSITIVE RHEUMATOID FACTOR: ICD-10-CM

## 2017-07-18 RX ORDER — LEFLUNOMIDE 20 MG/1
TABLET ORAL
Qty: 30 TABLET | Refills: 2 | Status: SHIPPED | OUTPATIENT
Start: 2017-07-18 | End: 2017-10-16 | Stop reason: SDUPTHER

## 2017-07-18 RX ORDER — PREDNISONE 5 MG/1
TABLET ORAL
Qty: 60 TABLET | Refills: 1 | Status: SHIPPED | OUTPATIENT
Start: 2017-07-18 | End: 2017-09-26 | Stop reason: SDUPTHER

## 2017-07-27 ENCOUNTER — PATIENT MESSAGE (OUTPATIENT)
Dept: RHEUMATOLOGY | Facility: CLINIC | Age: 47
End: 2017-07-27

## 2017-07-27 DIAGNOSIS — M05.79 RHEUMATOID ARTHRITIS INVOLVING MULTIPLE SITES WITH POSITIVE RHEUMATOID FACTOR: ICD-10-CM

## 2017-07-27 DIAGNOSIS — T50.905A DRUG-INDUCED LUPUS ERYTHEMATOSUS: ICD-10-CM

## 2017-07-27 DIAGNOSIS — R76.8 POSITIVE ANA (ANTINUCLEAR ANTIBODY): ICD-10-CM

## 2017-07-27 DIAGNOSIS — R80.9 PROTEINURIA, UNSPECIFIED TYPE: Primary | ICD-10-CM

## 2017-07-27 DIAGNOSIS — L93.2 DRUG-INDUCED LUPUS ERYTHEMATOSUS: ICD-10-CM

## 2017-07-28 PROBLEM — R80.9 PROTEINURIA: Status: ACTIVE | Noted: 2017-07-28

## 2017-07-28 NOTE — TELEPHONE ENCOUNTER
Informed patient of labs.  She is currently taking Plaquenil, prednisone 10 mg, SSZ and leflunomide.  Labs show improvement in dsDNA from 25 at [facility] to normal <1.  With 2+ protein on urine.  ESR increase.  She still has joint pains.  Consult nephroplogy.  She will continue 10 mg prednisone until follow up.

## 2017-08-09 DIAGNOSIS — M05.79 RHEUMATOID ARTHRITIS INVOLVING MULTIPLE SITES WITH POSITIVE RHEUMATOID FACTOR: ICD-10-CM

## 2017-08-09 RX ORDER — LEFLUNOMIDE 10 MG/1
TABLET ORAL
Qty: 30 TABLET | Refills: 0 | OUTPATIENT
Start: 2017-08-09

## 2017-08-11 ENCOUNTER — TELEPHONE (OUTPATIENT)
Dept: RHEUMATOLOGY | Facility: CLINIC | Age: 47
End: 2017-08-11

## 2017-08-11 NOTE — TELEPHONE ENCOUNTER
Patient reports she kicked out of portal.  She is having rash on both arms and chest.    Patient told hold Plaquenil.  Patient to keep appointment with dermatology.  She can increase from 10 mg to 15 mg prednisone if symptoms worsen  She will send update Monday

## 2017-08-11 NOTE — TELEPHONE ENCOUNTER
----- Message from Libia Hernandez MA sent at 8/11/2017 10:45 AM CDT -----  Contact: [email or web address]      ----- Message -----  From: Janet Hernandez  Sent: 8/11/2017  10:40 AM  To: Flex Blank A Staff    patient called to give update on her condition pleas call patient.

## 2017-08-31 ENCOUNTER — OFFICE VISIT (OUTPATIENT)
Dept: RHEUMATOLOGY | Facility: CLINIC | Age: 47
End: 2017-08-31
Payer: COMMERCIAL

## 2017-08-31 VITALS
BODY MASS INDEX: 42.19 KG/M2 | TEMPERATURE: 99 F | WEIGHT: 238.13 LBS | DIASTOLIC BLOOD PRESSURE: 95 MMHG | HEART RATE: 94 BPM | HEIGHT: 63 IN | SYSTOLIC BLOOD PRESSURE: 159 MMHG

## 2017-08-31 DIAGNOSIS — M05.79 RHEUMATOID ARTHRITIS INVOLVING MULTIPLE SITES WITH POSITIVE RHEUMATOID FACTOR: Primary | ICD-10-CM

## 2017-08-31 DIAGNOSIS — D84.9 IMMUNOSUPPRESSION: ICD-10-CM

## 2017-08-31 PROCEDURE — 3077F SYST BP >= 140 MM HG: CPT | Mod: S$GLB,,, | Performed by: INTERNAL MEDICINE

## 2017-08-31 PROCEDURE — 99999 PR PBB SHADOW E&M-EST. PATIENT-LVL III: CPT | Mod: PBBFAC,,, | Performed by: INTERNAL MEDICINE

## 2017-08-31 PROCEDURE — 3008F BODY MASS INDEX DOCD: CPT | Mod: S$GLB,,, | Performed by: INTERNAL MEDICINE

## 2017-08-31 PROCEDURE — 99214 OFFICE O/P EST MOD 30 MIN: CPT | Mod: S$GLB,,, | Performed by: INTERNAL MEDICINE

## 2017-08-31 PROCEDURE — 3080F DIAST BP >= 90 MM HG: CPT | Mod: S$GLB,,, | Performed by: INTERNAL MEDICINE

## 2017-08-31 RX ORDER — HYDROXYCHLOROQUINE SULFATE 200 MG/1
200 TABLET, FILM COATED ORAL DAILY
COMMUNITY
End: 2017-10-16 | Stop reason: SDUPTHER

## 2017-08-31 ASSESSMENT — ROUTINE ASSESSMENT OF PATIENT INDEX DATA (RAPID3)
MDHAQ FUNCTION SCORE: .1
TOTAL RAPID3 SCORE: .11
PATIENT GLOBAL ASSESSMENT SCORE: 0
FATIGUE SCORE: 1.5
PSYCHOLOGICAL DISTRESS SCORE: 0
PAIN SCORE: 0
AM STIFFNESS SCORE: 0, NO

## 2017-08-31 NOTE — PROGRESS NOTES
"Subjective:       Patient ID: Antonio Ruiz is a 47 y.o. female.    Chief Complaint: Rheumatoid Arthritis      HPI:  Antonio Ruiz is a 47 y.o. female  with a history   of rheumatoid arthritis manifested by symmetrical small joint involvement,   history of elbow contracture on the right in the past, positive rheumatoid   factor, positive WILMA. Humira stopped due to rash and pain.  The patient currently takes leflunomide 10 mg daily, prednisone 5 mg daily, sulfasalazine 1500 mg twice a day and Plaquenil.     She is pain free and has tapered prednisone to 5 mg daily since last week.   Rash improved with cream/foam from dermatology.  She has continued Plaquenil.    Review of Systems   Constitutional: Positive for fatigue.   HENT: Negative.    Eyes: Negative.    Respiratory: Negative.    Cardiovascular: Positive for leg swelling.   Gastrointestinal: Negative.    Endocrine: Negative.    Genitourinary: Negative.    Musculoskeletal: Negative.    Skin: Positive for rash.   Allergic/Immunologic: Negative.    Neurological: Negative.    Hematological: Negative.    Psychiatric/Behavioral: Negative.          Objective:   BP (!) 159/95 (BP Location: Right arm, Patient Position: Sitting, BP Method: Large (Automatic))   Pulse 94   Temp 98.8 °F (37.1 °C) (Oral)   Ht 5' 3" (1.6 m)   Wt 108 kg (238 lb 1.6 oz)   LMP 08/07/2017   BMI 42.18 kg/m²      Physical Exam   Constitutional: She is oriented to person, place, and time and well-developed, well-nourished, and in no distress.   HENT:   Head: Normocephalic and atraumatic.   Eyes: Conjunctivae and EOM are normal.   Neck: Neck supple.   Cardiovascular: Normal rate, regular rhythm and normal heart sounds.    Feet are swollen   Pulmonary/Chest: Effort normal and breath sounds normal.   Abdominal: Soft. Bowel sounds are normal.   Neurological: She is alert and oriented to person, place, and time. Gait normal.   Skin: Skin is warm and dry.     Psychiatric: Mood and affect " normal.   Musculoskeletal:   28 joint count: 0 swollen and 0 tender            Assessment:       1. RA. Now off Orencia due to pain.  She is on prednisone 5 mg, Plaquenil, leflunomide and SSZ.  She denies pain  2. Drug induced lupus. Rash and Positive WILMA/SSA/ Matthew.  Now with +DS DNA and normal complements.  Repeat labs in one month.   3. Alopecia  4. Increase ETOH intake.  Due to stress and joint pain.  Up to 3 drinks at a time (couple times a week).    She does not feel like she should cut back.  5. Leg edema bilateral after starting Norvasc     Plan:       1.  Labs  2.  Encouraged to minimize ETOH intake  3.  Message to primary regarding swelling and elevated BP.  RTO 3 months

## 2017-08-31 NOTE — Clinical Note
Mrs. Ruiz is experiencing edema of her feet since starting amlodipine.  I asked her to follow with you.  Her BP was elevated 159/95 but this could be due to her taking Britta [place] cold tabs.  I told her not to take this.

## 2017-09-11 ENCOUNTER — OFFICE VISIT (OUTPATIENT)
Dept: NEPHROLOGY | Facility: CLINIC | Age: 47
End: 2017-09-11
Payer: COMMERCIAL

## 2017-09-11 VITALS
WEIGHT: 237.19 LBS | DIASTOLIC BLOOD PRESSURE: 80 MMHG | BODY MASS INDEX: 42.03 KG/M2 | OXYGEN SATURATION: 99 % | HEIGHT: 63 IN | HEART RATE: 84 BPM | SYSTOLIC BLOOD PRESSURE: 110 MMHG

## 2017-09-11 DIAGNOSIS — N18.1 CKD (CHRONIC KIDNEY DISEASE) STAGE 1, GFR 90 ML/MIN OR GREATER: Primary | ICD-10-CM

## 2017-09-11 DIAGNOSIS — R80.9 PROTEINURIA, UNSPECIFIED TYPE: ICD-10-CM

## 2017-09-11 DIAGNOSIS — M06.9 RHEUMATOID ARTHRITIS, INVOLVING UNSPECIFIED SITE, UNSPECIFIED RHEUMATOID FACTOR PRESENCE: ICD-10-CM

## 2017-09-11 DIAGNOSIS — I10 ESSENTIAL HYPERTENSION: ICD-10-CM

## 2017-09-11 DIAGNOSIS — M32.8 OTHER FORMS OF SYSTEMIC LUPUS ERYTHEMATOSUS, UNSPECIFIED ORGAN INVOLVEMENT STATUS: ICD-10-CM

## 2017-09-11 PROCEDURE — 99999 PR PBB SHADOW E&M-EST. PATIENT-LVL III: CPT | Mod: PBBFAC,,, | Performed by: INTERNAL MEDICINE

## 2017-09-11 PROCEDURE — 99203 OFFICE O/P NEW LOW 30 MIN: CPT | Mod: S$GLB,,, | Performed by: INTERNAL MEDICINE

## 2017-09-11 PROCEDURE — 3074F SYST BP LT 130 MM HG: CPT | Mod: S$GLB,,, | Performed by: INTERNAL MEDICINE

## 2017-09-11 PROCEDURE — 3008F BODY MASS INDEX DOCD: CPT | Mod: S$GLB,,, | Performed by: INTERNAL MEDICINE

## 2017-09-11 PROCEDURE — 3079F DIAST BP 80-89 MM HG: CPT | Mod: S$GLB,,, | Performed by: INTERNAL MEDICINE

## 2017-09-11 NOTE — LETTER
September 11, 2017      Rosamaria Nicholson MD  [address]  [place] [address]           [facility] - Nephrology  [address]  [place] [address]  Phone: [phone]  Fax: [phone]          Patient: Antonio Ruiz   MR Number: [id number]   YOB: 1970   Date of Visit: 9/11/2017       Dear Dr. Rosamaria Nicholson:    Thank you for referring Antonio Ruiz to me for evaluation. Attached you will find relevant portions of my assessment and plan of care.    If you have questions, please do not hesitate to call me. I look forward to following Antonio Ruiz along with you.    Sincerely,    Shai Sethi MD    Enclosure  CC:  No Recipients    If you would like to receive this communication electronically, please contact [email or web address] or [phone] to request more information on [facility] access.    For providers and/or their staff who would like to refer a patient to [facility], please contact us through our one-stop-shop provider referral line, [facility], at [phone].    If you feel you have received this communication in error or would no longer like to receive these types of communications, please e-mail [email or web address]

## 2017-09-11 NOTE — PROGRESS NOTES
Patient is here today for evaluation of CKD. Baseline Cr; 0.8 mg/dl.  Her most recent lab (7/21/17) Cr; 0.71 mg/dl; eGFR; 100 ml/min; potassium; 4.0 mmol/L; Urine microALB;/Cr; 149; There is a hx of SLE; RA; and proteinuria (microalbumin).  She is hypertensive managed with amlodipine; notes ankle swelling; at end of day; .There is a hx of RA and drug-induced lupus Currents meds include prednisone; hydroxychloroquine. Today she has no new complaints    ROS;  Obese woman; no acute distress; oriented x 3  Mood and Affect; Appropriate  Otherwise non-contributory    Exam  HEENT; Grossly intact  CHEST; Clear P&A; no rales or rhonchi  HEART; RR; S1&S2 no murmur rub gallop  ABD; BS(+); non-tender; (-)CVAT  EXT; (-)Edema    Impression  CKD Microalbuminuria; she is not diabetic; she does have lupus; Will monitor for now; consider adding ARB  U/A with quarterly labs    Hypertension Satisfactory control  Edema; CCB-induced ?    Plan;  Return Visit; 3 mo

## 2017-09-22 ENCOUNTER — PATIENT MESSAGE (OUTPATIENT)
Dept: NEPHROLOGY | Facility: CLINIC | Age: 47
End: 2017-09-22

## 2017-09-22 ENCOUNTER — PATIENT MESSAGE (OUTPATIENT)
Dept: RHEUMATOLOGY | Facility: CLINIC | Age: 47
End: 2017-09-22

## 2017-09-22 ENCOUNTER — PATIENT MESSAGE (OUTPATIENT)
Dept: INTERNAL MEDICINE | Facility: CLINIC | Age: 47
End: 2017-09-22

## 2017-09-22 RX ORDER — LOSARTAN POTASSIUM 100 MG/1
100 TABLET ORAL DAILY
Qty: 90 TABLET | Refills: 3 | Status: SHIPPED | OUTPATIENT
Start: 2017-09-22 | End: 2018-09-19 | Stop reason: SDUPTHER

## 2017-09-26 DIAGNOSIS — M05.79 RHEUMATOID ARTHRITIS INVOLVING MULTIPLE SITES WITH POSITIVE RHEUMATOID FACTOR: ICD-10-CM

## 2017-09-27 RX ORDER — PREDNISONE 5 MG/1
TABLET ORAL
Qty: 60 TABLET | Refills: 0 | Status: SHIPPED | OUTPATIENT
Start: 2017-09-27 | End: 2017-10-16 | Stop reason: SDUPTHER

## 2017-10-03 ENCOUNTER — PATIENT OUTREACH (OUTPATIENT)
Dept: INTERNAL MEDICINE | Facility: CLINIC | Age: 47
End: 2017-10-03

## 2017-10-03 ENCOUNTER — PATIENT MESSAGE (OUTPATIENT)
Dept: INTERNAL MEDICINE | Facility: CLINIC | Age: 47
End: 2017-10-03

## 2017-10-03 NOTE — PROGRESS NOTES
Patient contacted via [facility] patient portal in regards to healthcare matters that were past due. Encouraged to contact facility or speak with PCP .

## 2017-10-10 ENCOUNTER — PATIENT MESSAGE (OUTPATIENT)
Dept: INTERNAL MEDICINE | Facility: CLINIC | Age: 47
End: 2017-10-10

## 2017-10-13 ENCOUNTER — PATIENT MESSAGE (OUTPATIENT)
Dept: RHEUMATOLOGY | Facility: CLINIC | Age: 47
End: 2017-10-13

## 2017-10-13 DIAGNOSIS — M05.79 RHEUMATOID ARTHRITIS INVOLVING MULTIPLE SITES WITH POSITIVE RHEUMATOID FACTOR: ICD-10-CM

## 2017-10-15 ENCOUNTER — PATIENT MESSAGE (OUTPATIENT)
Dept: RHEUMATOLOGY | Facility: CLINIC | Age: 47
End: 2017-10-15

## 2017-10-15 DIAGNOSIS — E55.9 VITAMIN D DEFICIENCY DISEASE: ICD-10-CM

## 2017-10-15 DIAGNOSIS — M05.79 RHEUMATOID ARTHRITIS INVOLVING MULTIPLE SITES WITH POSITIVE RHEUMATOID FACTOR: ICD-10-CM

## 2017-10-16 RX ORDER — HYDROXYCHLOROQUINE SULFATE 200 MG/1
TABLET, FILM COATED ORAL
Qty: 60 TABLET | Refills: 0 | OUTPATIENT
Start: 2017-10-16

## 2017-10-16 RX ORDER — PREDNISONE 5 MG/1
TABLET ORAL
Qty: 60 TABLET | Refills: 0 | Status: SHIPPED | OUTPATIENT
Start: 2017-10-16 | End: 2017-11-28 | Stop reason: SDUPTHER

## 2017-10-16 RX ORDER — ERGOCALCIFEROL 1.25 MG/1
50000 CAPSULE ORAL
Qty: 12 CAPSULE | Refills: 0 | Status: SHIPPED | OUTPATIENT
Start: 2017-10-16 | End: 2019-04-25

## 2017-10-16 RX ORDER — IBUPROFEN 800 MG/1
TABLET ORAL
Qty: 90 TABLET | Refills: 2 | Status: SHIPPED | OUTPATIENT
Start: 2017-10-16 | End: 2019-04-01 | Stop reason: SDUPTHER

## 2017-10-16 RX ORDER — SULFASALAZINE 500 MG/1
TABLET ORAL
Qty: 180 TABLET | Refills: 2 | Status: SHIPPED | OUTPATIENT
Start: 2017-10-16 | End: 2018-03-18 | Stop reason: SDUPTHER

## 2017-10-16 RX ORDER — LEFLUNOMIDE 20 MG/1
TABLET ORAL
Qty: 30 TABLET | Refills: 2 | Status: SHIPPED | OUTPATIENT
Start: 2017-10-16 | End: 2018-01-21 | Stop reason: SDUPTHER

## 2017-10-16 RX ORDER — HYDROXYCHLOROQUINE SULFATE 200 MG/1
200 TABLET, FILM COATED ORAL 2 TIMES DAILY
Qty: 60 TABLET | Refills: 2 | Status: SHIPPED | OUTPATIENT
Start: 2017-10-16 | End: 2018-02-11 | Stop reason: SDUPTHER

## 2017-10-20 ENCOUNTER — HOSPITAL ENCOUNTER (OUTPATIENT)
Dept: RADIOLOGY | Facility: OTHER | Age: 47
Discharge: HOME | End: 2017-10-20
Attending: INTERNAL MEDICINE
Payer: COMMERCIAL

## 2017-10-20 ENCOUNTER — OFFICE VISIT (OUTPATIENT)
Dept: INTERNAL MEDICINE | Facility: CLINIC | Age: 47
End: 2017-10-20
Attending: INTERNAL MEDICINE
Payer: COMMERCIAL

## 2017-10-20 VITALS
SYSTOLIC BLOOD PRESSURE: 158 MMHG | DIASTOLIC BLOOD PRESSURE: 98 MMHG | WEIGHT: 241.88 LBS | BODY MASS INDEX: 42.86 KG/M2 | HEART RATE: 78 BPM | HEIGHT: 63 IN

## 2017-10-20 DIAGNOSIS — J06.9 UPPER RESPIRATORY TRACT INFECTION, UNSPECIFIED TYPE: ICD-10-CM

## 2017-10-20 DIAGNOSIS — J06.9 UPPER RESPIRATORY TRACT INFECTION, UNSPECIFIED TYPE: Primary | ICD-10-CM

## 2017-10-20 PROCEDURE — 90686 IIV4 VACC NO PRSV 0.5 ML IM: CPT | Mod: S$GLB,,, | Performed by: INTERNAL MEDICINE

## 2017-10-20 PROCEDURE — 71020 XR CHEST PA AND LATERAL: CPT | Mod: 26,,, | Performed by: RADIOLOGY

## 2017-10-20 PROCEDURE — 71020 XR CHEST PA AND LATERAL: CPT | Mod: TC

## 2017-10-20 PROCEDURE — 99999 PR PBB SHADOW E&M-EST. PATIENT-LVL III: CPT | Mod: PBBFAC,,, | Performed by: INTERNAL MEDICINE

## 2017-10-20 PROCEDURE — 99213 OFFICE O/P EST LOW 20 MIN: CPT | Mod: 25,S$GLB,, | Performed by: INTERNAL MEDICINE

## 2017-10-20 PROCEDURE — 90471 IMMUNIZATION ADMIN: CPT | Mod: S$GLB,,, | Performed by: INTERNAL MEDICINE

## 2017-10-20 RX ORDER — CODEINE PHOSPHATE AND GUAIFENESIN 10; 100 MG/5ML; MG/5ML
5-10 SOLUTION ORAL EVERY 4 HOURS PRN
Qty: 118 ML | Refills: 0 | Status: SHIPPED | OUTPATIENT
Start: 2017-10-20 | End: 2017-10-30

## 2017-10-20 RX ORDER — HYDROCHLOROTHIAZIDE 25 MG/1
25 TABLET ORAL DAILY
Qty: 90 TABLET | Refills: 2 | Status: SHIPPED | OUTPATIENT
Start: 2017-10-20 | End: 2018-08-06 | Stop reason: SDUPTHER

## 2017-10-20 NOTE — PROGRESS NOTES
"Subjective:       Patient ID: Antonio Ruiz is a 47 y.o. female.    Chief Complaint: Hypertension (f/u)    Here for urgent visit    47 yp F with PMHx of RA on chronic prednisone, currenrtly 5mg daily, 2 weeks ago developed rhinorrhea, sneezing, sore throat, occasionally productive cough. Patient denies F/C, SOB, abd pain, N/V, eye pain, severe HA, or inability to maintain adequate PO intake. Was taking various OTC multi symptom cold meds; delysm, Theralfu, and verena seltzer cold plus. Also Allegra and nasal steroid.          Cough   This is a new problem. The current episode started 1 to 4 weeks ago. The problem has been waxing and waning. The cough is non-productive. Pertinent negatives include no chest pain, chills, ear congestion, ear pain, fever, headaches, heartburn, hemoptysis, myalgias, nasal congestion, postnasal drip, rash, rhinorrhea, sore throat, shortness of breath, sweats, weight loss or wheezing. Nothing aggravates the symptoms. She has tried OTC cough suppressant for the symptoms. The treatment provided mild relief. There is no history of asthma, bronchiectasis, bronchitis, COPD, emphysema, environmental allergies or pneumonia.       Review of Systems   Constitutional: Negative for chills, fever and weight loss.   HENT: Negative for ear pain, postnasal drip, rhinorrhea and sore throat.    Respiratory: Positive for cough. Negative for hemoptysis, shortness of breath and wheezing.    Cardiovascular: Negative for chest pain.   Gastrointestinal: Negative for heartburn.   Musculoskeletal: Negative for myalgias.   Skin: Negative for rash.   Allergic/Immunologic: Negative for environmental allergies.   Neurological: Negative for headaches.       Objective:      Vitals:    10/20/17 1315   BP: (!) 158/98   Pulse: 78   Weight: 109.7 kg (241 lb 13.5 oz)   Height: 5' 3" (1.6 m)      Physical Exam   Constitutional: She is oriented to person, place, and time. She appears well-developed and well-nourished. She " does not have a sickly appearance. No distress.   HENT:   Head: Normocephalic and atraumatic.   Right Ear: Tympanic membrane, external ear and ear canal normal.   Left Ear: Tympanic membrane, external ear and ear canal normal.   Nose: No mucosal edema or rhinorrhea.   Mouth/Throat: No oropharyngeal exudate, posterior oropharyngeal edema or posterior oropharyngeal erythema.   Eyes: Conjunctivae and EOM are normal. Right eye exhibits no discharge. Left eye exhibits no discharge. No scleral icterus.   Pulmonary/Chest: Effort normal and breath sounds normal. No respiratory distress. She has no wheezes.   Abdominal: Normal appearance. She exhibits no distension.   Musculoskeletal: She exhibits no edema.   Lymphadenopathy:     She has no cervical adenopathy.   Neurological: She is alert and oriented to person, place, and time.   Skin: Skin is warm and dry. No rash noted. She is not diaphoretic.   Psychiatric: She has a normal mood and affect. Her speech is normal.       Assessment:       1. Upper respiratory tract infection, unspecified type        Plan:       Antonio was seen today for hypertension.    Diagnoses and all orders for this visit:    Upper respiratory tract infection, unspecified type  -     X-Ray Chest PA And Lateral; Future  -     guaifenesin-codeine 100-10 mg/5 ml (TUSSI-ORGANIDIN NR)  mg/5 mL syrup; Take 5-10 mLs by mouth every 4 (four) hours as needed for Cough. Max 60mL/24 hours                     Side effects of medication(s) were discussed in detail and patient voiced understanding.  Patient will call back for any issues or complications.

## 2017-10-20 NOTE — PROGRESS NOTES
Patient given PF Influenza IM in the LD. Patient tolerated well and Band-Aid was applied. Lot#[id number] Exp:6/30/2018. Patient advised to wait in the lobby for 15 min to make sure no adverse reactions occur. Patient states verbal understanding and has no further questions. Patient given vaccine information sheet.

## 2017-11-28 DIAGNOSIS — M05.79 RHEUMATOID ARTHRITIS INVOLVING MULTIPLE SITES WITH POSITIVE RHEUMATOID FACTOR: ICD-10-CM

## 2017-11-28 RX ORDER — PREDNISONE 5 MG/1
TABLET ORAL
Qty: 60 TABLET | Refills: 0 | Status: SHIPPED | OUTPATIENT
Start: 2017-11-28 | End: 2017-12-26 | Stop reason: SDUPTHER

## 2017-12-11 ENCOUNTER — OFFICE VISIT (OUTPATIENT)
Dept: RHEUMATOLOGY | Facility: CLINIC | Age: 47
End: 2017-12-11
Payer: COMMERCIAL

## 2017-12-11 VITALS
SYSTOLIC BLOOD PRESSURE: 136 MMHG | DIASTOLIC BLOOD PRESSURE: 88 MMHG | HEART RATE: 84 BPM | RESPIRATION RATE: 20 BRPM | BODY MASS INDEX: 41.72 KG/M2 | WEIGHT: 235.5 LBS

## 2017-12-11 DIAGNOSIS — D84.9 IMMUNOSUPPRESSION: ICD-10-CM

## 2017-12-11 DIAGNOSIS — R53.83 FATIGUE, UNSPECIFIED TYPE: ICD-10-CM

## 2017-12-11 DIAGNOSIS — L93.2 DRUG-INDUCED LUPUS ERYTHEMATOSUS: ICD-10-CM

## 2017-12-11 DIAGNOSIS — T50.905A DRUG-INDUCED LUPUS ERYTHEMATOSUS: ICD-10-CM

## 2017-12-11 DIAGNOSIS — M06.9 RHEUMATOID ARTHRITIS, INVOLVING UNSPECIFIED SITE, UNSPECIFIED RHEUMATOID FACTOR PRESENCE: Primary | ICD-10-CM

## 2017-12-11 PROCEDURE — 99214 OFFICE O/P EST MOD 30 MIN: CPT | Mod: S$GLB,,, | Performed by: INTERNAL MEDICINE

## 2017-12-11 PROCEDURE — 99999 PR PBB SHADOW E&M-EST. PATIENT-LVL III: CPT | Mod: PBBFAC,,, | Performed by: INTERNAL MEDICINE

## 2017-12-11 ASSESSMENT — ROUTINE ASSESSMENT OF PATIENT INDEX DATA (RAPID3)
AM STIFFNESS SCORE: 0, NO
TOTAL RAPID3 SCORE: .72
MDHAQ FUNCTION SCORE: .2
PAIN SCORE: 1.5
PATIENT GLOBAL ASSESSMENT SCORE: 0
PSYCHOLOGICAL DISTRESS SCORE: 0
FATIGUE SCORE: 2.5

## 2017-12-11 NOTE — PROGRESS NOTES
Subjective:       Patient ID: Antonio Ruiz is a 47 y.o. female.    Chief Complaint: Disease Management      HPI:  Antonio Ruiz is a 47 y.o. female with a history   of rheumatoid arthritis manifested by symmetrical small joint involvement,   history of elbow contracture on the right in the past, positive rheumatoid   factor, positive WILMA. Humira stopped due to rash and pain.  The patient currently takes leflunomide 10 mg daily, prednisone 5 mg daily, sulfasalazine 1500 mg twice a day and Plaquenil.      She had flare during Thanksgiving.  Now on prednisone 10 mg daily.   She has continued Plaquenil, SSZ and leflunomide.    Review of Systems   Constitutional: Positive for fatigue.   HENT: Negative.    Eyes: Negative.    Respiratory: Negative.    Cardiovascular: Negative.    Gastrointestinal: Negative.    Endocrine: Negative.    Genitourinary: Negative.    Musculoskeletal: Positive for arthralgias.   Skin: Negative.    Allergic/Immunologic: Negative.    Neurological: Negative.    Hematological: Negative.    Psychiatric/Behavioral: Negative.          Objective:   /88 (BP Location: Right arm, Patient Position: Sitting, BP Method: Large (Manual))   Pulse 84   Resp 20   Wt 106.8 kg (235 lb 8 oz)   LMP 12/08/2017   BMI 41.72 kg/m²      Physical Exam   Constitutional: She is oriented to person, place, and time and well-developed, well-nourished, and in no distress.   HENT:   Head: Normocephalic and atraumatic.   Eyes: Conjunctivae and EOM are normal.   Neck: Neck supple.   Cardiovascular: Normal rate, regular rhythm and normal heart sounds.    Pulmonary/Chest: Effort normal and breath sounds normal.   Abdominal: Soft. Bowel sounds are normal.   Neurological: She is alert and oriented to person, place, and time. Gait normal.   Skin: Skin is warm and dry.     Psychiatric: Mood and affect normal.   Musculoskeletal:   28 joint count: 1 swollen ( right 3rd MCP) and 1 tender (same)            Assessment:        1. RA. Now off Orencia due to no improvement in pain.  She is on prednisone 10 mg, Plaquenil, leflunomide and SSZ.  She denies pain  2. 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 Rash and Positive WILMA/SSA/ Matthew.  Now with +DS DNA and normal complements.  Repeat labs in one month.   3. Alopecia.  Started using steroid medication from primary doctor  4. Increase ETOH intake.  Due to stress and joint pain.  Now drinking daquiri once a week instead (couple times a week).    5. Leg edema bilateral after starting Norvasc  6. Diarrhea with medications.  Patient to confirm SSZ is coated  Plan:       1.  Labs  2.  Encouraged to minimize ETOH intake  3.  Consider rituximab.  Risk discussed.  Handout given  RTO 3 months/prn

## 2017-12-26 DIAGNOSIS — M05.79 RHEUMATOID ARTHRITIS INVOLVING MULTIPLE SITES WITH POSITIVE RHEUMATOID FACTOR: ICD-10-CM

## 2017-12-26 RX ORDER — PREDNISONE 5 MG/1
TABLET ORAL
Qty: 60 TABLET | Refills: 0 | Status: SHIPPED | OUTPATIENT
Start: 2017-12-26 | End: 2018-01-21 | Stop reason: SDUPTHER

## 2018-01-21 DIAGNOSIS — M05.79 RHEUMATOID ARTHRITIS INVOLVING MULTIPLE SITES WITH POSITIVE RHEUMATOID FACTOR: ICD-10-CM

## 2018-01-22 RX ORDER — LEFLUNOMIDE 20 MG/1
TABLET ORAL
Qty: 30 TABLET | Refills: 0 | Status: SHIPPED | OUTPATIENT
Start: 2018-01-22 | End: 2018-02-14 | Stop reason: SDUPTHER

## 2018-01-22 RX ORDER — PREDNISONE 5 MG/1
TABLET ORAL
Qty: 60 TABLET | Refills: 0 | Status: SHIPPED | OUTPATIENT
Start: 2018-01-22 | End: 2018-02-26 | Stop reason: SDUPTHER

## 2018-02-05 ENCOUNTER — PATIENT MESSAGE (OUTPATIENT)
Dept: RHEUMATOLOGY | Facility: CLINIC | Age: 48
End: 2018-02-05

## 2018-02-05 DIAGNOSIS — R53.83 FATIGUE, UNSPECIFIED TYPE: ICD-10-CM

## 2018-02-05 DIAGNOSIS — T50.905A DRUG-INDUCED LUPUS ERYTHEMATOSUS: ICD-10-CM

## 2018-02-05 DIAGNOSIS — R76.8 POSITIVE ANA (ANTINUCLEAR ANTIBODY): ICD-10-CM

## 2018-02-05 DIAGNOSIS — E55.9 VITAMIN D DEFICIENCY DISEASE: ICD-10-CM

## 2018-02-05 DIAGNOSIS — M06.9 RHEUMATOID ARTHRITIS, INVOLVING UNSPECIFIED SITE, UNSPECIFIED RHEUMATOID FACTOR PRESENCE: Primary | ICD-10-CM

## 2018-02-05 DIAGNOSIS — L93.2 DRUG-INDUCED LUPUS ERYTHEMATOSUS: ICD-10-CM

## 2018-02-05 DIAGNOSIS — D84.9 IMMUNOSUPPRESSION: ICD-10-CM

## 2018-02-11 RX ORDER — HYDROXYCHLOROQUINE SULFATE 200 MG/1
TABLET, FILM COATED ORAL
Qty: 60 TABLET | Refills: 0 | Status: SHIPPED | OUTPATIENT
Start: 2018-02-11 | End: 2018-03-22 | Stop reason: SDUPTHER

## 2018-02-14 DIAGNOSIS — M05.79 RHEUMATOID ARTHRITIS INVOLVING MULTIPLE SITES WITH POSITIVE RHEUMATOID FACTOR: ICD-10-CM

## 2018-02-14 RX ORDER — LEFLUNOMIDE 20 MG/1
TABLET ORAL
Qty: 30 TABLET | Refills: 0 | Status: SHIPPED | OUTPATIENT
Start: 2018-02-14 | End: 2018-03-23 | Stop reason: SDUPTHER

## 2018-02-26 DIAGNOSIS — M05.79 RHEUMATOID ARTHRITIS INVOLVING MULTIPLE SITES WITH POSITIVE RHEUMATOID FACTOR: ICD-10-CM

## 2018-02-26 RX ORDER — PREDNISONE 5 MG/1
TABLET ORAL
Qty: 60 TABLET | Refills: 0 | Status: SHIPPED | OUTPATIENT
Start: 2018-02-26 | End: 2018-03-26 | Stop reason: SDUPTHER

## 2018-03-18 DIAGNOSIS — M05.79 RHEUMATOID ARTHRITIS INVOLVING MULTIPLE SITES WITH POSITIVE RHEUMATOID FACTOR: ICD-10-CM

## 2018-03-18 RX ORDER — HYDROXYCHLOROQUINE SULFATE 200 MG/1
TABLET, FILM COATED ORAL
Qty: 60 TABLET | Refills: 0 | OUTPATIENT
Start: 2018-03-18

## 2018-03-19 RX ORDER — SULFASALAZINE 500 MG/1
TABLET ORAL
Qty: 180 TABLET | Refills: 0 | Status: SHIPPED | OUTPATIENT
Start: 2018-03-19 | End: 2018-03-29

## 2018-03-20 ENCOUNTER — PATIENT MESSAGE (OUTPATIENT)
Dept: RHEUMATOLOGY | Facility: CLINIC | Age: 48
End: 2018-03-20

## 2018-03-21 ENCOUNTER — PATIENT MESSAGE (OUTPATIENT)
Dept: RHEUMATOLOGY | Facility: CLINIC | Age: 48
End: 2018-03-21

## 2018-03-22 RX ORDER — HYDROXYCHLOROQUINE SULFATE 200 MG/1
TABLET, FILM COATED ORAL
Qty: 60 TABLET | Refills: 2 | Status: SHIPPED | OUTPATIENT
Start: 2018-03-22 | End: 2018-07-30 | Stop reason: SDUPTHER

## 2018-03-23 DIAGNOSIS — M05.79 RHEUMATOID ARTHRITIS INVOLVING MULTIPLE SITES WITH POSITIVE RHEUMATOID FACTOR: ICD-10-CM

## 2018-03-23 RX ORDER — LEFLUNOMIDE 20 MG/1
TABLET ORAL
Qty: 30 TABLET | Refills: 0 | Status: SHIPPED | OUTPATIENT
Start: 2018-03-23 | End: 2018-05-04

## 2018-03-26 DIAGNOSIS — M05.79 RHEUMATOID ARTHRITIS INVOLVING MULTIPLE SITES WITH POSITIVE RHEUMATOID FACTOR: ICD-10-CM

## 2018-03-26 RX ORDER — PREDNISONE 5 MG/1
TABLET ORAL
Qty: 60 TABLET | Refills: 0 | Status: SHIPPED | OUTPATIENT
Start: 2018-03-26 | End: 2018-04-26 | Stop reason: SDUPTHER

## 2018-03-29 ENCOUNTER — TELEPHONE (OUTPATIENT)
Dept: RHEUMATOLOGY | Facility: CLINIC | Age: 48
End: 2018-03-29

## 2018-03-29 ENCOUNTER — OFFICE VISIT (OUTPATIENT)
Dept: RHEUMATOLOGY | Facility: CLINIC | Age: 48
End: 2018-03-29
Payer: COMMERCIAL

## 2018-03-29 VITALS
BODY MASS INDEX: 42.38 KG/M2 | HEIGHT: 63 IN | WEIGHT: 239.19 LBS | SYSTOLIC BLOOD PRESSURE: 149 MMHG | HEART RATE: 93 BPM | DIASTOLIC BLOOD PRESSURE: 95 MMHG | TEMPERATURE: 98 F

## 2018-03-29 DIAGNOSIS — E55.9 VITAMIN D DEFICIENCY DISEASE: ICD-10-CM

## 2018-03-29 DIAGNOSIS — Z79.52 LONG TERM SYSTEMIC STEROID USER: ICD-10-CM

## 2018-03-29 DIAGNOSIS — R53.83 FATIGUE, UNSPECIFIED TYPE: ICD-10-CM

## 2018-03-29 DIAGNOSIS — M06.9 RHEUMATOID ARTHRITIS, INVOLVING UNSPECIFIED SITE, UNSPECIFIED RHEUMATOID FACTOR PRESENCE: Primary | ICD-10-CM

## 2018-03-29 PROCEDURE — 99215 OFFICE O/P EST HI 40 MIN: CPT | Mod: S$GLB,,, | Performed by: INTERNAL MEDICINE

## 2018-03-29 PROCEDURE — 99999 PR PBB SHADOW E&M-EST. PATIENT-LVL III: CPT | Mod: PBBFAC,,, | Performed by: INTERNAL MEDICINE

## 2018-03-29 PROCEDURE — 3077F SYST BP >= 140 MM HG: CPT | Mod: CPTII,S$GLB,, | Performed by: INTERNAL MEDICINE

## 2018-03-29 PROCEDURE — 3080F DIAST BP >= 90 MM HG: CPT | Mod: CPTII,S$GLB,, | Performed by: INTERNAL MEDICINE

## 2018-03-29 RX ORDER — SULFASALAZINE 500 MG/1
500 TABLET, DELAYED RELEASE ORAL 2 TIMES DAILY
Qty: 540 TABLET | Refills: 0 | Status: SHIPPED | OUTPATIENT
Start: 2018-03-29 | End: 2018-05-15 | Stop reason: SDUPTHER

## 2018-03-29 RX ORDER — SODIUM CHLORIDE 0.9 % (FLUSH) 0.9 %
10 SYRINGE (ML) INJECTION
Status: CANCELLED | OUTPATIENT
Start: 2018-04-16

## 2018-03-29 RX ORDER — ACETAMINOPHEN 325 MG/1
650 TABLET ORAL
Status: CANCELLED | OUTPATIENT
Start: 2018-04-16

## 2018-03-29 RX ORDER — HEPARIN 100 UNIT/ML
500 SYRINGE INTRAVENOUS
Status: CANCELLED | OUTPATIENT
Start: 2018-04-16

## 2018-03-29 RX ORDER — FAMOTIDINE 10 MG/ML
20 INJECTION INTRAVENOUS
Status: CANCELLED | OUTPATIENT
Start: 2018-04-16

## 2018-03-29 ASSESSMENT — ROUTINE ASSESSMENT OF PATIENT INDEX DATA (RAPID3)
FATIGUE SCORE: 5
PSYCHOLOGICAL DISTRESS SCORE: 0
PATIENT GLOBAL ASSESSMENT SCORE: 7
PAIN SCORE: 8
TOTAL RAPID3 SCORE: 5.78
AM STIFFNESS SCORE: 1, YES
WHEN YOU AWAKENED IN THE MORNING OVER THE LAST WEEK, PLEASE INDICATE THE AMOUNT OF TIME IT TAKES UNTIL YOU ARE AS LIMBER AS YOU WILL BE FOR THE DAY: 5 MINUTES
MDHAQ FUNCTION SCORE: .7

## 2018-03-29 NOTE — PROGRESS NOTES
"Subjective:       Patient ID: Antonio Ruiz is a 47 y.o. female.    Chief Complaint: Rheumatoid Arthritis      HPI:  Antonio Ruiz is a 47 y.o. female with a history   of rheumatoid arthritis manifested by symmetrical small joint involvement,   history of elbow contracture on the right in the past, positive rheumatoid   factor, positive WILMA. Humira stopped due to rash and pain.  The patient currently takes leflunomide 10 mg daily, prednisone 5 mg daily, sulfasalazine 1500 mg twice a day and Plaquenil.      Now on prednisone 10 mg daily.   She has continued Plaquenil, SSZ and leflunomide.         Would like to start Rituxan.  Left had swollen and numb in 1st-3rd finger.  Right hip pain this morning.  Difficulty driving but now improved.  Pain 5/10 ache hands, right hip and left knee.  Morning stiffness 2 hours.    Review of Systems   Constitutional: Positive for fatigue.   HENT: Negative.    Eyes: Negative.    Respiratory: Negative.    Cardiovascular: Negative.    Gastrointestinal: Negative.    Endocrine: Negative.    Genitourinary: Negative.    Musculoskeletal: Positive for arthralgias and joint swelling.   Skin: Negative.    Allergic/Immunologic: Negative.    Neurological: Positive for numbness.   Hematological: Negative.    Psychiatric/Behavioral: Negative.          Objective:   BP (!) 149/95 (BP Location: Right arm, Patient Position: Sitting, BP Method: Large (Automatic))   Pulse 93   Temp 98.4 °F (36.9 °C) (Oral)   Ht 5' 3" (1.6 m)   Wt 108.5 kg (239 lb 3.2 oz)   LMP 03/19/2018   BMI 42.37 kg/m²      Physical Exam   Constitutional: She is oriented to person, place, and time and well-developed, well-nourished, and in no distress.   HENT:   Head: Normocephalic and atraumatic.   Eyes: Conjunctivae and EOM are normal.   Neck: Neck supple.   Cardiovascular: Normal rate, regular rhythm and normal heart sounds.    Pulmonary/Chest: Effort normal and breath sounds normal.   Abdominal: Soft. Bowel sounds " are normal.   Neurological: She is alert and oriented to person, place, and time. Gait normal.   Skin: Skin is warm and dry. She is not diaphoretic.     Hair thinning on left.   Psychiatric: Mood and affect normal.   Musculoskeletal:   28 joint count: 0 swollen and 0 tender            Assessment:       1. RA. Now off Orencia due to no improvement in pain.  She is on prednisone 10 mg, Plaquenil, leflunomide and SSZ.  She notes persistent pain  2. 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 Rash and Positive WILMA/SSA/ Matthew.  Now with +DS DNA and normal complements.  Repeat labs in one month.   3. Alopecia.  Schedule follow up with Dr. Martines  4. Increase ETOH intake.  Due to stress and joint pain.  Now drinking daquiri once a week instead (couple times a week).    5. Leg edema bilateral after starting Norvasc  6. Diarrhea with medications.  Patient notes powder in bottle of SSZ suggesting they are not coated.    7. Obesity  8. Osteoporosis prophylaxis.     Plan:       1.  Labs  2.  Encouraged to minimize ETOH intake  3.  Get approval for Rituxan.  Reviewed and signed consent. Discussed risk for PML  4.   Follow up with Dr. Martines in dermatology  5.   Called pharmacy and switched SSZ to enteric coated.  6.   Calcium 1,000 mg daily and vitamin D 1,000 units daily.  Check DEXA next visit.  If unable to taper steroids after Rituxan will need anti-resorptive medication to prevent steroid induced osteoporosis.    Patient seen face to face for 40 minutes and greater than 50% spent in counseling regarding rituxan and side effects         RTO 3 months/prn

## 2018-03-29 NOTE — PATIENT INSTRUCTIONS
1.  We will request approval for Rituxan from your insurance.  Check with us if you have not heard anything by 4/9/18.    2.  Schedule follow up with dermatologist Dr. Martines    3.  Calcium 1,000 mg daily and vitamin D 1,000 units daily.  Check DEXA next visit.  If unable to taper steroids after Rituxan will need anti-resorptive medication to prevent steroid induced osteoporosis.    4. Risk of Rituxan is PML progressive multifocal leukoencephalopathy which is  is a deadly demyelinating disease of the central nervous system caused by a reactivation of the VENKAT virus that occurs almost exclusively in immunosuppressed individuals.     Progressive multifocal leukoencephalopathy: [US Boxed Warning]: Progressive multifocal leukoencephalopathy (PML) due to VENKAT virus infection has been reported with rituximab; may be fatal. Cases were reported in patients with hematologic malignancies receiving rituximab either with combination chemotherapy, or with hematopoietic stem cell transplant. Cases were also reported in patients receiving rituximab for autoimmune diseases who had received concurrent or prior immunosuppressant therapy. Onset may be delayed, although most cases were diagnosed within 12 months of the last rituximab dose. A retrospective analysis of patients (n=57) diagnosed with PML following rituximab therapy, found a median of 16 months (following rituximab initiation), 5.5 months (following last rituximab dose), and 6 rituximab doses preceded PML diagnosis. Clinical findings included confusion/disorientation, motor weakness/hemiparesis, altered vision/speech, and poor motor coordination with symptoms progressing over weeks to months (Samir 2009). Promptly evaluate any patient presenting with neurological changes; consider neurology consultation, brain MRI and lumbar puncture for suspected PML. Discontinue rituximab in patients who develop PML; consider reduction/discontinuation of concurrent chemotherapy or  immunosuppressants.

## 2018-03-29 NOTE — ADDENDUM NOTE
Addended by: KATHLEEN RIVERS on: 3/29/2018 03:53 PM     Modules accepted: Orders, Level of Service

## 2018-04-02 ENCOUNTER — PATIENT MESSAGE (OUTPATIENT)
Dept: RHEUMATOLOGY | Facility: CLINIC | Age: 48
End: 2018-04-02

## 2018-04-23 ENCOUNTER — PATIENT MESSAGE (OUTPATIENT)
Dept: RHEUMATOLOGY | Facility: CLINIC | Age: 48
End: 2018-04-23

## 2018-04-23 DIAGNOSIS — M05.79 RHEUMATOID ARTHRITIS INVOLVING MULTIPLE SITES WITH POSITIVE RHEUMATOID FACTOR: ICD-10-CM

## 2018-04-23 RX ORDER — LEFLUNOMIDE 20 MG/1
TABLET ORAL
Qty: 30 TABLET | Refills: 0 | OUTPATIENT
Start: 2018-04-23

## 2018-04-26 DIAGNOSIS — M05.79 RHEUMATOID ARTHRITIS INVOLVING MULTIPLE SITES WITH POSITIVE RHEUMATOID FACTOR: ICD-10-CM

## 2018-04-26 RX ORDER — PREDNISONE 5 MG/1
TABLET ORAL
Qty: 60 TABLET | Refills: 0 | Status: SHIPPED | OUTPATIENT
Start: 2018-04-26 | End: 2018-05-27 | Stop reason: SDUPTHER

## 2018-05-03 ENCOUNTER — TELEPHONE (OUTPATIENT)
Dept: RHEUMATOLOGY | Facility: CLINIC | Age: 48
End: 2018-05-03

## 2018-05-03 NOTE — TELEPHONE ENCOUNTER
MTX held due to concern it was causing nodules on elbows.  Message to patient to consider switching from Arava to MTX and seeing if nodules return.  Could consider Actemra as an option by may not cover lupus like symptoms.  Will send patient info on Actemra via [facility] to see if she wants to try it or switch back to MTX and try Rituxan.

## 2018-05-04 ENCOUNTER — PATIENT MESSAGE (OUTPATIENT)
Dept: RHEUMATOLOGY | Facility: CLINIC | Age: 48
End: 2018-05-04

## 2018-05-04 DIAGNOSIS — M05.79 RHEUMATOID ARTHRITIS INVOLVING MULTIPLE SITES WITH POSITIVE RHEUMATOID FACTOR: Primary | ICD-10-CM

## 2018-05-04 RX ORDER — FOLIC ACID 1 MG/1
1 TABLET ORAL DAILY
Qty: 30 TABLET | Refills: 2 | Status: SHIPPED | OUTPATIENT
Start: 2018-05-04 | End: 2018-07-29 | Stop reason: SDUPTHER

## 2018-05-04 RX ORDER — METHOTREXATE 2.5 MG/1
10 TABLET ORAL
Qty: 16 TABLET | Refills: 2 | Status: SHIPPED | OUTPATIENT
Start: 2018-05-04 | End: 2018-07-27 | Stop reason: SDUPTHER

## 2018-05-15 ENCOUNTER — TELEPHONE (OUTPATIENT)
Dept: RHEUMATOLOGY | Facility: CLINIC | Age: 48
End: 2018-05-15

## 2018-05-15 RX ORDER — SULFASALAZINE 500 MG/1
1500 TABLET, DELAYED RELEASE ORAL 2 TIMES DAILY
Qty: 540 TABLET | Refills: 0 | Status: SHIPPED | OUTPATIENT
Start: 2018-05-15 | End: 2018-11-04 | Stop reason: SDUPTHER

## 2018-05-27 DIAGNOSIS — M05.79 RHEUMATOID ARTHRITIS INVOLVING MULTIPLE SITES WITH POSITIVE RHEUMATOID FACTOR: ICD-10-CM

## 2018-05-28 RX ORDER — PREDNISONE 5 MG/1
TABLET ORAL
Qty: 60 TABLET | Refills: 0 | Status: SHIPPED | OUTPATIENT
Start: 2018-05-28 | End: 2018-07-01 | Stop reason: SDUPTHER

## 2018-06-04 ENCOUNTER — OFFICE VISIT (OUTPATIENT)
Dept: RHEUMATOLOGY | Facility: CLINIC | Age: 48
End: 2018-06-04
Payer: COMMERCIAL

## 2018-06-04 VITALS
SYSTOLIC BLOOD PRESSURE: 147 MMHG | HEART RATE: 72 BPM | BODY MASS INDEX: 43.98 KG/M2 | WEIGHT: 248.31 LBS | RESPIRATION RATE: 20 BRPM | DIASTOLIC BLOOD PRESSURE: 88 MMHG

## 2018-06-04 DIAGNOSIS — R53.83 FATIGUE, UNSPECIFIED TYPE: ICD-10-CM

## 2018-06-04 DIAGNOSIS — D84.9 IMMUNOSUPPRESSION: ICD-10-CM

## 2018-06-04 DIAGNOSIS — Z79.52 LONG TERM SYSTEMIC STEROID USER: ICD-10-CM

## 2018-06-04 DIAGNOSIS — R76.8 POSITIVE ANA (ANTINUCLEAR ANTIBODY): ICD-10-CM

## 2018-06-04 DIAGNOSIS — M05.79 RHEUMATOID ARTHRITIS INVOLVING MULTIPLE SITES WITH POSITIVE RHEUMATOID FACTOR: Primary | ICD-10-CM

## 2018-06-04 PROCEDURE — 3077F SYST BP >= 140 MM HG: CPT | Mod: CPTII,S$GLB,, | Performed by: INTERNAL MEDICINE

## 2018-06-04 PROCEDURE — 3008F BODY MASS INDEX DOCD: CPT | Mod: CPTII,S$GLB,, | Performed by: INTERNAL MEDICINE

## 2018-06-04 PROCEDURE — 99999 PR PBB SHADOW E&M-EST. PATIENT-LVL III: CPT | Mod: PBBFAC,,, | Performed by: INTERNAL MEDICINE

## 2018-06-04 PROCEDURE — 3079F DIAST BP 80-89 MM HG: CPT | Mod: CPTII,S$GLB,, | Performed by: INTERNAL MEDICINE

## 2018-06-04 PROCEDURE — 99214 OFFICE O/P EST MOD 30 MIN: CPT | Mod: S$GLB,,, | Performed by: INTERNAL MEDICINE

## 2018-06-04 ASSESSMENT — ROUTINE ASSESSMENT OF PATIENT INDEX DATA (RAPID3)
MDHAQ FUNCTION SCORE: .2
FATIGUE SCORE: 2
TOTAL RAPID3 SCORE: .22
AM STIFFNESS SCORE: 0, NO
PATIENT GLOBAL ASSESSMENT SCORE: 0
PSYCHOLOGICAL DISTRESS SCORE: 0
PAIN SCORE: 0

## 2018-06-04 NOTE — PROGRESS NOTES
Rapid3 Question Responses and Scores 6/3/2018   MDHAQ Score 0.2   Psychologic Score 0   Pain Score 0   When you awakened in the morning OVER THE LAST WEEK, did you feel stiff? No   Fatigue Score 2   Global Health Score 0   RAPID3 Score 0.22       Answers for HPI/ROS submitted by the patient on 6/3/2018   fever: No  eye redness: No  headaches: No  shortness of breath: No  chest pain: No  trouble swallowing: No  diarrhea: No  constipation: No  unexpected weight change: No  genital sore: No  dysuria: No  During the last 3 days, have you had a skin rash?: No  Bruises or bleeds easily: No  cough: No

## 2018-06-04 NOTE — PROGRESS NOTES
Subjective:       Patient ID: Antonio Ruiz is a 48 y.o. female.    Chief Complaint: Disease Management      HPI:  Antonio Ruiz is a 48 y.o. female with a history   of rheumatoid arthritis manifested by symmetrical small joint involvement,   history of elbow contracture on the right in the past, positive rheumatoid   factor, positive WILMA. Humira stopped due to rash and pain.  The patient currently takes Methotrexate 10 mg weekly, folic acid daily, prednisone 10 mg daily, sulfasalazine 1500 mg twice a day and Plaquenil.   She denies pain or morning stiffness since starting MTX.          Review of Systems   Constitutional: Negative for fever and unexpected weight change.   HENT: Negative for trouble swallowing.    Eyes: Negative for redness.   Respiratory: Negative for cough and shortness of breath.    Cardiovascular: Negative for chest pain.   Gastrointestinal: Negative for constipation and diarrhea.   Endocrine: Negative.    Genitourinary: Negative for dysuria and genital sores.   Musculoskeletal: Positive for arthralgias.   Skin: Negative for rash.   Allergic/Immunologic: Negative.    Neurological: Negative for headaches.   Hematological: Does not bruise/bleed easily.   Psychiatric/Behavioral: Negative.          Objective:   BP (!) 147/88   Pulse 72   Resp 20   Wt 112.6 kg (248 lb 4.8 oz)   LMP 05/05/2018   BMI 43.98 kg/m²      Physical Exam   Constitutional: She is oriented to person, place, and time and well-developed, well-nourished, and in no distress.   HENT:   Head: Normocephalic and atraumatic.   Eyes: Conjunctivae and EOM are normal.   Neck: Neck supple.   Cardiovascular: Normal rate, regular rhythm and normal heart sounds.    Pulmonary/Chest: Effort normal and breath sounds normal.   Abdominal: Soft. Bowel sounds are normal.   Neurological: She is alert and oriented to person, place, and time. No cranial nerve deficit. Gait normal.   Skin: Skin is warm and dry.     Psychiatric: Mood and  affect normal.   Musculoskeletal: She exhibits no edema, tenderness or deformity.   28 joint count: 0 swollen and 0 tender            Assessment:       1. RA. Now off Orencia due to no improvement in pain.  She is on prednisone 10 mg, Plaquenil, methotrexate and SSZ.  She notes improvement in pain with restarting MTX  2. 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 Rash and Positive WILMA/SSA/ Matthew.  Now with +DS DNA and normal complements.  Repeat labs in one month.   3. Alopecia.  Schedule follow up with Dr. Martines  4. Leg edema bilateral after starting Norvasc  5. Diarrhea with medications.  with noncoated SSZ  6. Obesity  7. Osteoporosis prophylaxis.      Plan:       1.  Labs  2.  Decrease prednisone to 5 mg daily if flare do 7.5 mg   3.  Hold on Rituxan for now.    4.   Follow up with Dr. Martines in dermatology  5.   Calcium 1,000 mg daily and vitamin D 1,000 units daily.  Order DEXA.       Patient seen face to face for 25 minutes and greater than 50% spent in counseling regarding rituxan and side effects

## 2018-07-01 DIAGNOSIS — M05.79 RHEUMATOID ARTHRITIS INVOLVING MULTIPLE SITES WITH POSITIVE RHEUMATOID FACTOR: ICD-10-CM

## 2018-07-02 RX ORDER — PREDNISONE 5 MG/1
TABLET ORAL
Qty: 60 TABLET | Refills: 0 | Status: SHIPPED | OUTPATIENT
Start: 2018-07-02 | End: 2018-08-06 | Stop reason: SDUPTHER

## 2018-07-27 DIAGNOSIS — M05.79 RHEUMATOID ARTHRITIS INVOLVING MULTIPLE SITES WITH POSITIVE RHEUMATOID FACTOR: Primary | ICD-10-CM

## 2018-07-27 DIAGNOSIS — R53.83 FATIGUE, UNSPECIFIED TYPE: ICD-10-CM

## 2018-07-27 DIAGNOSIS — L93.2 DRUG-INDUCED LUPUS ERYTHEMATOSUS: ICD-10-CM

## 2018-07-27 DIAGNOSIS — T50.905A DRUG-INDUCED LUPUS ERYTHEMATOSUS: ICD-10-CM

## 2018-07-27 DIAGNOSIS — R76.8 POSITIVE ANA (ANTINUCLEAR ANTIBODY): ICD-10-CM

## 2018-07-27 DIAGNOSIS — Z79.52 LONG TERM SYSTEMIC STEROID USER: ICD-10-CM

## 2018-07-27 DIAGNOSIS — E55.9 VITAMIN D DEFICIENCY DISEASE: ICD-10-CM

## 2018-07-27 DIAGNOSIS — D84.9 IMMUNOSUPPRESSION: ICD-10-CM

## 2018-07-29 DIAGNOSIS — M05.79 RHEUMATOID ARTHRITIS INVOLVING MULTIPLE SITES WITH POSITIVE RHEUMATOID FACTOR: ICD-10-CM

## 2018-07-30 RX ORDER — METHOTREXATE 2.5 MG/1
TABLET ORAL
Qty: 16 TABLET | Refills: 0 | Status: SHIPPED | OUTPATIENT
Start: 2018-07-30 | End: 2018-08-27 | Stop reason: SDUPTHER

## 2018-07-30 RX ORDER — HYDROXYCHLOROQUINE SULFATE 200 MG/1
TABLET, FILM COATED ORAL
Qty: 60 TABLET | Refills: 0 | Status: SHIPPED | OUTPATIENT
Start: 2018-07-30 | End: 2018-09-07 | Stop reason: SDUPTHER

## 2018-07-30 RX ORDER — FOLIC ACID 1 MG/1
TABLET ORAL
Qty: 30 TABLET | Refills: 0 | Status: SHIPPED | OUTPATIENT
Start: 2018-07-30 | End: 2018-08-28 | Stop reason: SDUPTHER

## 2018-08-06 DIAGNOSIS — M05.79 RHEUMATOID ARTHRITIS INVOLVING MULTIPLE SITES WITH POSITIVE RHEUMATOID FACTOR: ICD-10-CM

## 2018-08-06 RX ORDER — PREDNISONE 5 MG/1
TABLET ORAL
Qty: 60 TABLET | Refills: 0 | Status: SHIPPED | OUTPATIENT
Start: 2018-08-06 | End: 2018-09-05 | Stop reason: SDUPTHER

## 2018-08-07 ENCOUNTER — PATIENT MESSAGE (OUTPATIENT)
Dept: RHEUMATOLOGY | Facility: CLINIC | Age: 48
End: 2018-08-07

## 2018-08-08 RX ORDER — HYDROCHLOROTHIAZIDE 25 MG/1
TABLET ORAL
Qty: 90 TABLET | Refills: 0 | Status: SHIPPED | OUTPATIENT
Start: 2018-08-08 | End: 2018-11-18 | Stop reason: SDUPTHER

## 2018-08-27 ENCOUNTER — PATIENT MESSAGE (OUTPATIENT)
Dept: RHEUMATOLOGY | Facility: CLINIC | Age: 48
End: 2018-08-27

## 2018-08-27 DIAGNOSIS — M05.79 RHEUMATOID ARTHRITIS INVOLVING MULTIPLE SITES WITH POSITIVE RHEUMATOID FACTOR: ICD-10-CM

## 2018-08-28 DIAGNOSIS — M05.79 RHEUMATOID ARTHRITIS INVOLVING MULTIPLE SITES WITH POSITIVE RHEUMATOID FACTOR: ICD-10-CM

## 2018-08-28 RX ORDER — METHOTREXATE 2.5 MG/1
TABLET ORAL
Qty: 16 TABLET | Refills: 2 | Status: SHIPPED | OUTPATIENT
Start: 2018-08-28 | End: 2018-12-03 | Stop reason: SDUPTHER

## 2018-08-28 RX ORDER — FOLIC ACID 1 MG/1
TABLET ORAL
Qty: 30 TABLET | Refills: 5 | Status: SHIPPED | OUTPATIENT
Start: 2018-08-28 | End: 2019-03-21 | Stop reason: SDUPTHER

## 2018-09-05 DIAGNOSIS — M05.79 RHEUMATOID ARTHRITIS INVOLVING MULTIPLE SITES WITH POSITIVE RHEUMATOID FACTOR: ICD-10-CM

## 2018-09-05 RX ORDER — PREDNISONE 5 MG/1
TABLET ORAL
Qty: 60 TABLET | Refills: 0 | Status: SHIPPED | OUTPATIENT
Start: 2018-09-05 | End: 2018-10-04 | Stop reason: SDUPTHER

## 2018-09-07 RX ORDER — HYDROXYCHLOROQUINE SULFATE 200 MG/1
TABLET, FILM COATED ORAL
Qty: 60 TABLET | Refills: 0 | Status: SHIPPED | OUTPATIENT
Start: 2018-09-07 | End: 2018-11-04 | Stop reason: SDUPTHER

## 2018-09-20 RX ORDER — LOSARTAN POTASSIUM 100 MG/1
TABLET ORAL
Qty: 90 TABLET | Refills: 0 | Status: SHIPPED | OUTPATIENT
Start: 2018-09-20 | End: 2018-12-26 | Stop reason: SDUPTHER

## 2018-10-04 DIAGNOSIS — M05.79 RHEUMATOID ARTHRITIS INVOLVING MULTIPLE SITES WITH POSITIVE RHEUMATOID FACTOR: ICD-10-CM

## 2018-10-04 RX ORDER — PREDNISONE 5 MG/1
TABLET ORAL
Qty: 60 TABLET | Refills: 0 | Status: SHIPPED | OUTPATIENT
Start: 2018-10-04 | End: 2018-11-04 | Stop reason: SDUPTHER

## 2018-11-04 DIAGNOSIS — M05.79 RHEUMATOID ARTHRITIS INVOLVING MULTIPLE SITES WITH POSITIVE RHEUMATOID FACTOR: ICD-10-CM

## 2018-11-05 ENCOUNTER — TELEPHONE (OUTPATIENT)
Dept: RHEUMATOLOGY | Facility: CLINIC | Age: 48
End: 2018-11-05

## 2018-11-05 RX ORDER — SULFASALAZINE 500 MG/1
TABLET, DELAYED RELEASE ORAL
Qty: 540 TABLET | Refills: 0 | Status: SHIPPED | OUTPATIENT
Start: 2018-11-05 | End: 2018-11-07

## 2018-11-05 RX ORDER — HYDROXYCHLOROQUINE SULFATE 200 MG/1
TABLET, FILM COATED ORAL
Qty: 60 TABLET | Refills: 0 | Status: SHIPPED | OUTPATIENT
Start: 2018-11-05 | End: 2018-12-26

## 2018-11-05 RX ORDER — PREDNISONE 5 MG/1
TABLET ORAL
Qty: 60 TABLET | Refills: 0 | Status: SHIPPED | OUTPATIENT
Start: 2018-11-05 | End: 2018-12-03 | Stop reason: SDUPTHER

## 2018-11-05 NOTE — TELEPHONE ENCOUNTER
Please schedule standing labs which are overdue and needs f/iu with Dr. Francis. Thanks MARIO ALBERTO

## 2018-11-07 RX ORDER — SULFASALAZINE 500 MG/1
TABLET, DELAYED RELEASE ORAL
Qty: 540 TABLET | Refills: 0 | Status: SHIPPED | OUTPATIENT
Start: 2018-11-07 | End: 2020-05-12

## 2018-11-18 DIAGNOSIS — I10 ESSENTIAL HYPERTENSION: Primary | ICD-10-CM

## 2018-11-19 DIAGNOSIS — R76.8 POSITIVE ANA (ANTINUCLEAR ANTIBODY): ICD-10-CM

## 2018-11-19 DIAGNOSIS — T50.905A DRUG-INDUCED LUPUS ERYTHEMATOSUS: ICD-10-CM

## 2018-11-19 DIAGNOSIS — R80.9 PROTEINURIA, UNSPECIFIED TYPE: ICD-10-CM

## 2018-11-19 DIAGNOSIS — M05.79 RHEUMATOID ARTHRITIS INVOLVING MULTIPLE SITES WITH POSITIVE RHEUMATOID FACTOR: Primary | ICD-10-CM

## 2018-11-19 DIAGNOSIS — L93.2 DRUG-INDUCED LUPUS ERYTHEMATOSUS: ICD-10-CM

## 2018-11-19 DIAGNOSIS — D84.9 IMMUNOSUPPRESSION: ICD-10-CM

## 2018-11-19 RX ORDER — HYDROCHLOROTHIAZIDE 25 MG/1
TABLET ORAL
Qty: 90 TABLET | Refills: 2 | Status: SHIPPED | OUTPATIENT
Start: 2018-11-19 | End: 2019-09-17 | Stop reason: SDUPTHER

## 2018-11-19 RX ORDER — METHOTREXATE 2.5 MG/1
TABLET ORAL
Qty: 16 TABLET | Refills: 0 | OUTPATIENT
Start: 2018-11-19

## 2018-11-19 NOTE — TELEPHONE ENCOUNTER
Staff to inform patient that labs are needed before methotrexate can be filled.  Orders are in the computer so she can go to [facility] to have them done.  After labs are reviewed methotrexate will be filled.

## 2018-11-20 ENCOUNTER — PATIENT MESSAGE (OUTPATIENT)
Dept: PRIMARY CARE CLINIC | Facility: CLINIC | Age: 48
End: 2018-11-20

## 2018-11-22 ENCOUNTER — PATIENT MESSAGE (OUTPATIENT)
Dept: RHEUMATOLOGY | Facility: CLINIC | Age: 48
End: 2018-11-22

## 2018-12-03 ENCOUNTER — IMMUNIZATION (OUTPATIENT)
Dept: PHARMACY | Facility: CLINIC | Age: 48
End: 2018-12-03
Payer: COMMERCIAL

## 2018-12-03 ENCOUNTER — OFFICE VISIT (OUTPATIENT)
Dept: RHEUMATOLOGY | Facility: CLINIC | Age: 48
End: 2018-12-03
Payer: COMMERCIAL

## 2018-12-03 VITALS
WEIGHT: 252.19 LBS | SYSTOLIC BLOOD PRESSURE: 139 MMHG | DIASTOLIC BLOOD PRESSURE: 89 MMHG | BODY MASS INDEX: 44.68 KG/M2 | HEIGHT: 63 IN | HEART RATE: 75 BPM

## 2018-12-03 DIAGNOSIS — D84.9 IMMUNOSUPPRESSION: ICD-10-CM

## 2018-12-03 DIAGNOSIS — E66.01 CLASS 3 SEVERE OBESITY WITH SERIOUS COMORBIDITY AND BODY MASS INDEX (BMI) OF 40.0 TO 44.9 IN ADULT, UNSPECIFIED OBESITY TYPE: ICD-10-CM

## 2018-12-03 DIAGNOSIS — M05.79 RHEUMATOID ARTHRITIS INVOLVING MULTIPLE SITES WITH POSITIVE RHEUMATOID FACTOR: ICD-10-CM

## 2018-12-03 DIAGNOSIS — R53.83 FATIGUE, UNSPECIFIED TYPE: ICD-10-CM

## 2018-12-03 DIAGNOSIS — L93.2 DRUG-INDUCED LUPUS ERYTHEMATOSUS: Primary | ICD-10-CM

## 2018-12-03 DIAGNOSIS — T50.905A DRUG-INDUCED LUPUS ERYTHEMATOSUS: Primary | ICD-10-CM

## 2018-12-03 PROCEDURE — 99214 OFFICE O/P EST MOD 30 MIN: CPT | Mod: S$GLB,,, | Performed by: INTERNAL MEDICINE

## 2018-12-03 PROCEDURE — 99999 PR PBB SHADOW E&M-EST. PATIENT-LVL III: CPT | Mod: PBBFAC,,, | Performed by: INTERNAL MEDICINE

## 2018-12-03 PROCEDURE — 3008F BODY MASS INDEX DOCD: CPT | Mod: CPTII,S$GLB,, | Performed by: INTERNAL MEDICINE

## 2018-12-03 PROCEDURE — 3075F SYST BP GE 130 - 139MM HG: CPT | Mod: CPTII,S$GLB,, | Performed by: INTERNAL MEDICINE

## 2018-12-03 PROCEDURE — 3079F DIAST BP 80-89 MM HG: CPT | Mod: CPTII,S$GLB,, | Performed by: INTERNAL MEDICINE

## 2018-12-03 RX ORDER — METHOTREXATE 2.5 MG/1
15 TABLET ORAL
Qty: 24 TABLET | Refills: 2 | Status: SHIPPED | OUTPATIENT
Start: 2018-12-03 | End: 2019-03-07 | Stop reason: SDUPTHER

## 2018-12-03 RX ORDER — PREDNISONE 2.5 MG/1
7.5 TABLET ORAL DAILY
Qty: 90 TABLET | Refills: 1 | Status: SHIPPED | OUTPATIENT
Start: 2018-12-03 | End: 2019-03-13 | Stop reason: SDUPTHER

## 2018-12-03 ASSESSMENT — ROUTINE ASSESSMENT OF PATIENT INDEX DATA (RAPID3)
MDHAQ FUNCTION SCORE: 0
TOTAL RAPID3 SCORE: .33
PSYCHOLOGICAL DISTRESS SCORE: 0
PAIN SCORE: 0
AM STIFFNESS SCORE: 0, NO
FATIGUE SCORE: 6
PATIENT GLOBAL ASSESSMENT SCORE: 1

## 2018-12-03 NOTE — PROGRESS NOTES
"Subjective:       Patient ID: Antonio Ruiz is a 48 y.o. female.    Chief Complaint: Rheumatoid arthritis    HPI:  Antonio Ruiz is a 48 y.o. female with a history   of rheumatoid arthritis manifested by symmetrical small joint involvement,   history of elbow contracture on the right in the past, positive rheumatoid   factor, positive WILMA. Humira stopped due to rash and pain.  Orencia did not help.  The patient currently takes Methotrexate 10 mg weekly, folic acid daily, prednisone 10 mg daily, sulfasalazine 1500 mg twice a day and Plaquenil.   She denies pain or morning stiffness since starting MTX.   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  Dry hands with weather change.         Review of Systems   Constitutional: Negative for fever and unexpected weight change (prednisone causing weight gain).   HENT: Negative for trouble swallowing.    Eyes: Negative for redness.   Respiratory: Negative for cough and shortness of breath.    Cardiovascular: Negative for chest pain.   Gastrointestinal: Negative for constipation and diarrhea.   Endocrine: Negative.    Genitourinary: Negative for dysuria and genital sores.   Musculoskeletal: Positive for arthralgias.   Skin: Negative for rash.   Allergic/Immunologic: Negative.    Neurological: Negative for headaches.   Hematological: Does not bruise/bleed easily.   Psychiatric/Behavioral: Negative.          Objective:   /89   Pulse 75   Ht 5' 3" (1.6 m)   Wt 114.4 kg (252 lb 3.3 oz)   BMI 44.68 kg/m²      Physical Exam   Constitutional: She is oriented to person, place, and time and well-developed, well-nourished, and in no distress.   HENT:   Head: Normocephalic and atraumatic.   Eyes: Conjunctivae and EOM are normal.   Neck: Neck supple.   Cardiovascular: Normal rate, regular rhythm and normal heart sounds.    Pulmonary/Chest: Effort normal and breath sounds normal.   Abdominal: Soft. Bowel sounds are normal.   Neurological: She is alert and " oriented to person, place, and time. No cranial nerve deficit. Gait normal.   Skin: Skin is warm and dry.     Psychiatric: Mood and affect normal.   Musculoskeletal: She exhibits no edema, tenderness or deformity.   28 joint count: 0 swollen and 0 tender            Assessment:       1. RA. Failed Humira and Orencia.  She is on prednisone 10 mg, Plaquenil, methotrexate and SSZ.  She notes improvement in pain with restarting MTX.  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   2. 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 Rash and Positive WILMA/SSA/ Matthew.  Now with +DS DNA and normal complements.  Repeat labs in one month.   3. Alopecia.  Schedule follow up with Dr. Martines  4. Leg edema bilateral after starting Norvasc  5. Diarrhea with medications.  with noncoated SSZ  6. Obesity  7. Osteoporosis prophylaxis.      Plan:       1.  Labs  2.  Increase MTX to 15 mg qweek.  Decrease prednisone to 7.5 mg for 2 weeks then 5 mg for 2 weeks then 2.5 mg   3.  Consider Xeljanz.  Hold on Rituxan for now.    4.  Follow up with Dr. Martines in dermatology  5.  Calcium 1,000 mg daily and vitamin D 1,000 units daily.  Order DEXA.       Patient seen face to face for 25 minutes and greater than 50% spent in counseling regarding rituxan and side effects

## 2018-12-16 LAB
APPEARANCE UR: CLEAR
BILIRUB UR QL STRIP: NEGATIVE
COLOR UR: YELLOW
CREAT UR-MCNC: 146.4 MG/DL
GLUCOSE UR QL: NEGATIVE
HGB UR QL STRIP: NEGATIVE
KETONES UR QL STRIP: NEGATIVE
LEUKOCYTE ESTERASE UR QL STRIP: NEGATIVE
MICRO URNS: NORMAL
NITRITE UR QL STRIP: NEGATIVE
PH UR STRIP: 7 [PH] (ref 5–7.5)
PROT UR QL STRIP: NORMAL
PROT UR-MCNC: 27.8 MG/DL
PROT/CREAT UR: 190 MG/G CREAT (ref 0–200)
SP GR UR: 1.02 (ref 1–1.03)
UROBILINOGEN UR STRIP-MCNC: 0.2 MG/DL (ref 0.2–1)

## 2018-12-17 LAB
ALBUMIN SERPL-MCNC: 4 G/DL (ref 3.5–5.5)
ALBUMIN/GLOB SERPL: 1.3 {RATIO} (ref 1.2–2.2)
ALP SERPL-CCNC: 59 IU/L (ref 39–117)
ALT SERPL-CCNC: 11 IU/L (ref 0–32)
AST SERPL-CCNC: 13 IU/L (ref 0–40)
BASOPHILS # BLD AUTO: 0 X10E3/UL (ref 0–0.2)
BASOPHILS NFR BLD AUTO: 0 %
BILIRUB SERPL-MCNC: 0.2 MG/DL (ref 0–1.2)
BUN SERPL-MCNC: 10 MG/DL (ref 6–24)
BUN/CREAT SERPL: 13 (ref 9–23)
C3 SERPL-MCNC: 150 MG/DL (ref 82–167)
C4 SERPL-MCNC: 42 MG/DL (ref 14–44)
CALCIUM SERPL-MCNC: 9.5 MG/DL (ref 8.7–10.2)
CHLORIDE SERPL-SCNC: 100 MMOL/L (ref 96–106)
CK SERPL-CCNC: 130 U/L (ref 24–173)
CO2 SERPL-SCNC: 23 MMOL/L (ref 20–29)
CREAT SERPL-MCNC: 0.75 MG/DL (ref 0.57–1)
CRP SERPL-MCNC: 1.7 MG/L (ref 0–4.9)
DSDNA AB SER-ACNC: <1 IU/ML (ref 0–9)
EGFR IF AFRICAN AMERICAN: 107 ML/MIN/1.73
EOSINOPHIL # BLD AUTO: 0.1 X10E3/UL (ref 0–0.4)
EOSINOPHIL NFR BLD AUTO: 1 %
ERYTHROCYTE [DISTWIDTH] IN BLOOD BY AUTOMATED COUNT: 19.5 % (ref 12.3–15.4)
ERYTHROCYTE [SEDIMENTATION RATE] IN BLOOD BY WESTERGREN METHOD: 20 MM/HR (ref 0–40)
EST. GFR  (NON AFRICAN AMERICAN): 93 ML/MIN/1.73
GLOBULIN SER CALC-MCNC: 3.2 G/DL (ref 1.5–4.5)
GLUCOSE SERPL-MCNC: 81 MG/DL (ref 65–99)
HCT VFR BLD AUTO: 32.5 % (ref 34–46.6)
HGB BLD-MCNC: 10.1 G/DL (ref 11.1–15.9)
IMM GRANULOCYTES # BLD: 0 X10E3/UL (ref 0–0.1)
IMM GRANULOCYTES NFR BLD: 0 %
LYMPHOCYTES # BLD AUTO: 1.2 X10E3/UL (ref 0.7–3.1)
LYMPHOCYTES NFR BLD AUTO: 21 %
MCH RBC QN AUTO: 24.1 PG (ref 26.6–33)
MCHC RBC AUTO-ENTMCNC: 31.1 G/DL (ref 31.5–35.7)
MCV RBC AUTO: 78 FL (ref 79–97)
MONOCYTES # BLD AUTO: 0.6 X10E3/UL (ref 0.1–0.9)
MONOCYTES NFR BLD AUTO: 10 %
NEUTROPHILS # BLD AUTO: 3.9 X10E3/UL (ref 1.4–7)
NEUTROPHILS NFR BLD AUTO: 68 %
PLATELET # BLD AUTO: 370 X10E3/UL (ref 150–379)
POTASSIUM SERPL-SCNC: 4.1 MMOL/L (ref 3.5–5.2)
PROT SERPL-MCNC: 7.2 G/DL (ref 6–8.5)
RBC # BLD AUTO: 4.19 X10E6/UL (ref 3.77–5.28)
SODIUM SERPL-SCNC: 139 MMOL/L (ref 134–144)
WBC # BLD AUTO: 5.8 X10E3/UL (ref 3.4–10.8)

## 2018-12-18 ENCOUNTER — PATIENT MESSAGE (OUTPATIENT)
Dept: RHEUMATOLOGY | Facility: CLINIC | Age: 48
End: 2018-12-18

## 2018-12-18 DIAGNOSIS — R21 RASH: ICD-10-CM

## 2018-12-18 DIAGNOSIS — L93.2 DRUG-INDUCED LUPUS ERYTHEMATOSUS: ICD-10-CM

## 2018-12-18 DIAGNOSIS — T50.905A DRUG-INDUCED LUPUS ERYTHEMATOSUS: ICD-10-CM

## 2018-12-18 DIAGNOSIS — M05.79 RHEUMATOID ARTHRITIS INVOLVING MULTIPLE SITES WITH POSITIVE RHEUMATOID FACTOR: Primary | ICD-10-CM

## 2018-12-18 DIAGNOSIS — D84.9 IMMUNOSUPPRESSION: ICD-10-CM

## 2018-12-21 ENCOUNTER — PATIENT MESSAGE (OUTPATIENT)
Dept: RHEUMATOLOGY | Facility: CLINIC | Age: 48
End: 2018-12-21

## 2018-12-26 DIAGNOSIS — I10 ESSENTIAL HYPERTENSION: ICD-10-CM

## 2018-12-26 DIAGNOSIS — M05.79 RHEUMATOID ARTHRITIS INVOLVING MULTIPLE SITES WITH POSITIVE RHEUMATOID FACTOR: Primary | ICD-10-CM

## 2018-12-26 RX ORDER — HYDROXYCHLOROQUINE SULFATE 200 MG/1
TABLET, FILM COATED ORAL
Qty: 60 TABLET | Refills: 0 | Status: SHIPPED | OUTPATIENT
Start: 2018-12-26 | End: 2019-02-20 | Stop reason: SDUPTHER

## 2018-12-26 RX ORDER — LOSARTAN POTASSIUM 100 MG/1
TABLET ORAL
Qty: 90 TABLET | Refills: 2 | Status: SHIPPED | OUTPATIENT
Start: 2018-12-26 | End: 2019-10-03 | Stop reason: SDUPTHER

## 2018-12-26 RX ORDER — HYDROXYCHLOROQUINE SULFATE 200 MG/1
TABLET, FILM COATED ORAL
Qty: 60 TABLET | Refills: 0 | Status: CANCELLED | OUTPATIENT
Start: 2018-12-26

## 2018-12-26 NOTE — TELEPHONE ENCOUNTER
Antonio Ruiz would like a refill of the following medications:         losartan (COZAAR) 100 MG tablet [Emeka Mari MD]     Preferred pharmacy: [facility] [place] [id number] AT [address]   Delivery method: Pickup       Medication renewals requested in this message routed separately:         hydroxychloroquine (PLAQUENIL) 200 mg tablet [José Luna MD

## 2019-02-08 ENCOUNTER — PATIENT MESSAGE (OUTPATIENT)
Dept: RHEUMATOLOGY | Facility: CLINIC | Age: 49
End: 2019-02-08

## 2019-02-21 RX ORDER — HYDROXYCHLOROQUINE SULFATE 200 MG/1
TABLET, FILM COATED ORAL
Qty: 60 TABLET | Refills: 2 | Status: SHIPPED | OUTPATIENT
Start: 2019-02-21 | End: 2019-06-12 | Stop reason: SDUPTHER

## 2019-02-22 RX ORDER — HYDROXYCHLOROQUINE SULFATE 200 MG/1
TABLET, FILM COATED ORAL
Qty: 60 TABLET | Refills: 2 | Status: CANCELLED | OUTPATIENT
Start: 2019-02-22

## 2019-02-27 ENCOUNTER — OFFICE VISIT (OUTPATIENT)
Dept: RHEUMATOLOGY | Facility: CLINIC | Age: 49
End: 2019-02-27
Payer: COMMERCIAL

## 2019-02-27 VITALS
SYSTOLIC BLOOD PRESSURE: 162 MMHG | HEART RATE: 61 BPM | DIASTOLIC BLOOD PRESSURE: 86 MMHG | HEIGHT: 64 IN | WEIGHT: 250.88 LBS | BODY MASS INDEX: 42.83 KG/M2

## 2019-02-27 DIAGNOSIS — E55.9 VITAMIN D DEFICIENCY DISEASE: ICD-10-CM

## 2019-02-27 DIAGNOSIS — L93.2 DRUG-INDUCED LUPUS ERYTHEMATOSUS: ICD-10-CM

## 2019-02-27 DIAGNOSIS — T50.905A DRUG-INDUCED LUPUS ERYTHEMATOSUS: ICD-10-CM

## 2019-02-27 DIAGNOSIS — Z79.52 LONG TERM SYSTEMIC STEROID USER: ICD-10-CM

## 2019-02-27 DIAGNOSIS — E66.01 CLASS 3 SEVERE OBESITY WITH SERIOUS COMORBIDITY AND BODY MASS INDEX (BMI) OF 40.0 TO 44.9 IN ADULT, UNSPECIFIED OBESITY TYPE: ICD-10-CM

## 2019-02-27 DIAGNOSIS — D84.9 IMMUNOSUPPRESSION: ICD-10-CM

## 2019-02-27 DIAGNOSIS — M05.79 RHEUMATOID ARTHRITIS INVOLVING MULTIPLE SITES WITH POSITIVE RHEUMATOID FACTOR: Primary | ICD-10-CM

## 2019-02-27 DIAGNOSIS — R53.83 FATIGUE, UNSPECIFIED TYPE: ICD-10-CM

## 2019-02-27 PROCEDURE — 3077F PR MOST RECENT SYSTOLIC BLOOD PRESSURE >= 140 MM HG: ICD-10-PCS | Mod: CPTII,S$GLB,, | Performed by: INTERNAL MEDICINE

## 2019-02-27 PROCEDURE — 99214 PR OFFICE/OUTPT VISIT, EST, LEVL IV, 30-39 MIN: ICD-10-PCS | Mod: S$GLB,,, | Performed by: INTERNAL MEDICINE

## 2019-02-27 PROCEDURE — 99999 PR PBB SHADOW E&M-EST. PATIENT-LVL III: CPT | Mod: PBBFAC,,, | Performed by: INTERNAL MEDICINE

## 2019-02-27 PROCEDURE — 99214 OFFICE O/P EST MOD 30 MIN: CPT | Mod: S$GLB,,, | Performed by: INTERNAL MEDICINE

## 2019-02-27 PROCEDURE — 3077F SYST BP >= 140 MM HG: CPT | Mod: CPTII,S$GLB,, | Performed by: INTERNAL MEDICINE

## 2019-02-27 PROCEDURE — 3080F PR MOST RECENT DIASTOLIC BLOOD PRESSURE >= 90 MM HG: ICD-10-PCS | Mod: CPTII,S$GLB,, | Performed by: INTERNAL MEDICINE

## 2019-02-27 PROCEDURE — 3080F DIAST BP >= 90 MM HG: CPT | Mod: CPTII,S$GLB,, | Performed by: INTERNAL MEDICINE

## 2019-02-27 PROCEDURE — 99999 PR PBB SHADOW E&M-EST. PATIENT-LVL III: ICD-10-PCS | Mod: PBBFAC,,, | Performed by: INTERNAL MEDICINE

## 2019-02-27 PROCEDURE — 3008F PR BODY MASS INDEX (BMI) DOCUMENTED: ICD-10-PCS | Mod: CPTII,S$GLB,, | Performed by: INTERNAL MEDICINE

## 2019-02-27 PROCEDURE — 3008F BODY MASS INDEX DOCD: CPT | Mod: CPTII,S$GLB,, | Performed by: INTERNAL MEDICINE

## 2019-02-27 ASSESSMENT — ROUTINE ASSESSMENT OF PATIENT INDEX DATA (RAPID3)
PATIENT GLOBAL ASSESSMENT SCORE: 2
MDHAQ FUNCTION SCORE: .2
TOTAL RAPID3 SCORE: 1.89
PSYCHOLOGICAL DISTRESS SCORE: 0
AM STIFFNESS SCORE: 1, YES
WHEN YOU AWAKENED IN THE MORNING OVER THE LAST WEEK, PLEASE INDICATE THE AMOUNT OF TIME IT TAKES UNTIL YOU ARE AS LIMBER AS YOU WILL BE FOR THE DAY: 3 MINUTES
FATIGUE SCORE: 3
PAIN SCORE: 3

## 2019-02-27 NOTE — PROGRESS NOTES
"Subjective:       Patient ID: Antonio Ruiz is a 48 y.o. female.    Chief Complaint: Rheumatoid arthritis    HPI:  Antonio Ruiz is a 48 y.o. female with a history   of rheumatoid arthritis manifested by symmetrical small joint involvement,   history of elbow contracture on the right in the past, positive rheumatoid   factor, positive WILMA. Humira stopped due to rash and pain.  Orencia did not help.  The patient currently takes Methotrexate 15 mg weekly, folic acid daily, prednisone 5 mg daily, sulfasalazine 1500 mg twice a day and Plaquenil 200 mg bid.  Misses evening SSZ.  Unable to taper prednisone below 5 mg.    She reports 3 minutes morning stiffness.   Right wrist and tops of feet hurt.  Pain is 3/10 ache.  Driving causes aches and stiffness.  Pain while sleeping in the body.  Nothing helps pain.            Review of Systems   Constitutional: Positive for fatigue. Negative for fever and unexpected weight change (prednisone causing weight gain).   HENT: Negative.  Negative for trouble swallowing.    Eyes: Negative.  Negative for redness.   Respiratory: Negative.  Negative for cough and shortness of breath.    Cardiovascular: Negative.  Negative for chest pain.   Gastrointestinal: Negative.  Negative for constipation and diarrhea.   Endocrine: Negative.    Genitourinary: Negative.  Negative for dysuria and genital sores.   Musculoskeletal: Positive for arthralgias.   Skin: Negative for rash.   Allergic/Immunologic: Negative.    Neurological: Negative.  Negative for headaches.   Hematological: Negative.  Does not bruise/bleed easily.   Psychiatric/Behavioral: Negative.          Objective:   BP (!) 172/95   Pulse 62   Ht 5' 4" (1.626 m)   Wt 113.8 kg (250 lb 14.1 oz)   BMI 43.06 kg/m²      Physical Exam   Constitutional: She is oriented to person, place, and time and well-developed, well-nourished, and in no distress.   HENT:   Head: Normocephalic and atraumatic.   Eyes: Conjunctivae and EOM are " normal.   Neck: Neck supple.   Cardiovascular: Normal rate, regular rhythm and normal heart sounds.    Pulmonary/Chest: Effort normal and breath sounds normal.   Abdominal: Soft. Bowel sounds are normal.   Neurological: She is alert and oriented to person, place, and time. No cranial nerve deficit. Gait normal.   Skin: Skin is warm and dry.     Psychiatric: Mood and affect normal.   Musculoskeletal: She exhibits no edema, tenderness or deformity.   28 joint count: 0 swollen and 1 tender (right wrist)  No MTP tenderness bilaterally  No pain on top of feet       LABS    Component      Latest Ref Rng & Units 12/15/2018   WBC      3.4 - 10.8 x10E3/uL 5.8   RBC      3.77 - 5.28 x10E6/uL 4.19   Hemoglobin      11.1 - 15.9 g/dL 10.1 (L)   Hematocrit      34.0 - 46.6 % 32.5 (L)   MCV      79 - 97 fL 78 (L)   MCH      26.6 - 33.0 pg 24.1 (L)   MCHC      31.5 - 35.7 g/dL 31.1 (L)   RDW      12.3 - 15.4 % 19.5 (H)   Platelets      150 - 379 x10E3/uL 370   Neutrophils      Not Estab. % 68   Lymph%      Not Estab. % 21   Mono%      Not Estab. % 10   Eosinophil%      Not Estab. % 1   Basophil%      Not Estab. % 0   Neutrophils Absolute      1.4 - 7.0 x10E3/uL 3.9   Lymph #      0.7 - 3.1 x10E3/uL 1.2   Mono #      0.1 - 0.9 x10E3/uL 0.6   Eos #      0.0 - 0.4 x10E3/uL 0.1   Baso #      0.0 - 0.2 x10E3/uL 0.0   Immature Granulocytes      Not Estab. % 0   Immature Grans (Abs)      0.0 - 0.1 x10E3/uL 0.0   Glucose      65 - 99 mg/dL 81   BUN, Bld      6 - 24 mg/dL 10   Creatinine      0.57 - 1.00 mg/dL 0.75   eGFR if non African American      >59 mL/min/1.73 93   eGFR if       >59 mL/min/1.73 107   BUN/Creatinine Ratio      9 - 23 13   Sodium      134 - 144 mmol/L 139   Potassium      3.5 - 5.2 mmol/L 4.1   Chloride      96 - 106 mmol/L 100   CO2      20 - 29 mmol/L 23   Calcium      8.7 - 10.2 mg/dL 9.5   Total Protein      6.0 - 8.5 g/dL 7.2   Albumin      3.5 - 5.5 g/dL 4.0   Globulin, Total      1.5 - 4.5 g/dL  3.2   Albumin/Globulin Ratio      1.2 - 2.2 1.3   Total Bilirubin      0.0 - 1.2 mg/dL 0.2   Alkaline Phosphatase      39 - 117 IU/L 59   AST      0 - 40 IU/L 13   ALT      0 - 32 IU/L 11   Specific Gravity, UA      1.005 - 1.030 1.023   pH, UA      5.0 - 7.5 7.0   Color, UA      Yellow Yellow   Clarity, UA      Clear Clear   Leukocytes, UA      Negative Negative   Protein, UA      Negative/Trace Trace   Glucose, UA      Negative Negative   Ketones, UA      Negative Negative   Occult Blood UA      Negative Negative   Bilirubin, UA      Negative Negative   Urobilinogen, UA      0.2 - 1.0 mg/dL 0.2   Nitrite, UA      Negative Negative   Microscopic Examination       Comment   Creatinine, Random Ur      Not Estab. mg/dL 146.4   Protein Urine Random      Not Estab. mg/dL 27.8   Protein/Creat Ratio      0 - 200 mg/g creat 190   ds DNA Ab      0 - 9 IU/mL <1   C3 Complement      82 - 167 mg/dL 150   C4 Complement      14 - 44 mg/dL 42   Total CK      24 - 173 U/L 130   CRP      0.0 - 4.9 mg/L 1.7   Sed Rate      0 - 40 mm/hr 20        Assessment:       1. RA. Failed Humira and Orencia.  She is on prednisone 5 mg, Plaquenil, methotrexate and SSZ.  She notes persistent pain but misses SSZ.  Feet hurt and right wrist.   2. 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 Rash and Positive WILMA/SSA/ Matthew.  Previously +DS DNA and normal complements.  Repeat labs in one month.   3. Alopecia.  Schedule follow up with Dr. Martines  4. Leg edema bilateral after starting Norvasc  5. Diarrhea with medications.  with noncoated SSZ  6. Obesity  7. Osteoporosis prophylaxis.  8. HTN.  Elevated BP.  Patient to follow to primary doctor        Plan:       1.  Labs  2.  MTX 15 mg qweek.  Discussed SSZ compliance.  Patient encouraged to be compliant.  Continue prednisone 5 mg daily.    If no improvement increase MTX to 20 mg.   3.  Consider Xeljanz.  Hold on Rituxan for now.    4.  Follow up with Dr. Martines in dermatology  5.  Calcium 1,000 mg daily and  vitamin D 1,000 units daily.  Order DEXA.       Patient seen face to face for 25 minutes and greater than 50% spent in counseling regarding rituxan and side effects

## 2019-03-07 DIAGNOSIS — M05.79 RHEUMATOID ARTHRITIS INVOLVING MULTIPLE SITES WITH POSITIVE RHEUMATOID FACTOR: ICD-10-CM

## 2019-03-07 LAB
APPEARANCE UR: ABNORMAL
BILIRUB UR QL STRIP: NEGATIVE
COLOR UR: YELLOW
CREAT UR-MCNC: 224.9 MG/DL
GLUCOSE UR QL: NEGATIVE
HGB UR QL STRIP: NEGATIVE
KETONES UR QL STRIP: NEGATIVE
LEUKOCYTE ESTERASE UR QL STRIP: NEGATIVE
MICRO URNS: ABNORMAL
NITRITE UR QL STRIP: NEGATIVE
PH UR STRIP: 5.5 [PH] (ref 5–7.5)
PROT UR QL STRIP: NEGATIVE
PROT UR-MCNC: 68.3 MG/DL
PROT/CREAT UR: 304 MG/G CREAT (ref 0–200)
SP GR UR: >=1.03 (ref 1–1.03)
UROBILINOGEN UR STRIP-MCNC: 0.2 MG/DL (ref 0.2–1)

## 2019-03-07 RX ORDER — METHOTREXATE 2.5 MG/1
TABLET ORAL
Qty: 24 TABLET | Refills: 0 | Status: SHIPPED | OUTPATIENT
Start: 2019-03-07 | End: 2019-04-08 | Stop reason: SDUPTHER

## 2019-03-10 ENCOUNTER — PATIENT MESSAGE (OUTPATIENT)
Dept: RHEUMATOLOGY | Facility: CLINIC | Age: 49
End: 2019-03-10

## 2019-03-10 DIAGNOSIS — D64.9 ANEMIA, UNSPECIFIED TYPE: Primary | ICD-10-CM

## 2019-03-10 LAB
25(OH)D3+25(OH)D2 SERPL-MCNC: 13.9 NG/ML (ref 30–100)
ALBUMIN SERPL-MCNC: 4 G/DL (ref 3.5–5.5)
ALBUMIN/GLOB SERPL: 1.3 {RATIO} (ref 1.2–2.2)
ALP SERPL-CCNC: 52 IU/L (ref 39–117)
ALT SERPL-CCNC: 9 IU/L (ref 0–32)
AST SERPL-CCNC: 11 IU/L (ref 0–40)
BASOPHILS # BLD AUTO: 0 X10E3/UL (ref 0–0.2)
BASOPHILS NFR BLD AUTO: 0 %
BILIRUB SERPL-MCNC: <0.2 MG/DL (ref 0–1.2)
BUN SERPL-MCNC: 9 MG/DL (ref 6–24)
BUN/CREAT SERPL: 14 (ref 9–23)
C3 SERPL-MCNC: 119 MG/DL (ref 82–167)
C4 SERPL-MCNC: 34 MG/DL (ref 14–44)
CALCIUM SERPL-MCNC: 9.1 MG/DL (ref 8.7–10.2)
CHLORIDE SERPL-SCNC: 104 MMOL/L (ref 96–106)
CO2 SERPL-SCNC: 24 MMOL/L (ref 20–29)
CREAT SERPL-MCNC: 0.64 MG/DL (ref 0.57–1)
CRP SERPL-MCNC: 0.8 MG/L (ref 0–4.9)
DSDNA AB SER-ACNC: <1 IU/ML (ref 0–9)
EOSINOPHIL # BLD AUTO: 0.1 X10E3/UL (ref 0–0.4)
EOSINOPHIL NFR BLD AUTO: 1 %
ERYTHROCYTE [DISTWIDTH] IN BLOOD BY AUTOMATED COUNT: 20.4 % (ref 12.3–15.4)
ERYTHROCYTE [SEDIMENTATION RATE] IN BLOOD BY WESTERGREN METHOD: 16 MM/HR (ref 0–40)
GAMMA INTERFERON BACKGROUND BLD IA-ACNC: 0.03 IU/ML
GLOBULIN SER CALC-MCNC: 3.1 G/DL (ref 1.5–4.5)
GLUCOSE SERPL-MCNC: 87 MG/DL (ref 65–99)
HBA1C MFR BLD: 5.5 % (ref 4.8–5.6)
HCT VFR BLD AUTO: 29.4 % (ref 34–46.6)
HGB BLD-MCNC: 9.1 G/DL (ref 11.1–15.9)
IMM GRANULOCYTES # BLD AUTO: 0 X10E3/UL (ref 0–0.1)
IMM GRANULOCYTES NFR BLD AUTO: 0 %
LYMPHOCYTES # BLD AUTO: 1.1 X10E3/UL (ref 0.7–3.1)
LYMPHOCYTES NFR BLD AUTO: 20 %
M TB IFN-G BLD-IMP: NEGATIVE
M TB IFN-G CD4+ BCKGRND COR BLD-ACNC: 0.06 IU/ML
MCH RBC QN AUTO: 23.4 PG (ref 26.6–33)
MCHC RBC AUTO-ENTMCNC: 31 G/DL (ref 31.5–35.7)
MCV RBC AUTO: 76 FL (ref 79–97)
MITOGEN IGNF BLD-ACNC: 2.86 IU/ML
MONOCYTES # BLD AUTO: 0.5 X10E3/UL (ref 0.1–0.9)
MONOCYTES NFR BLD AUTO: 9 %
NEUTROPHILS # BLD AUTO: 3.7 X10E3/UL (ref 1.4–7)
NEUTROPHILS NFR BLD AUTO: 70 %
PLATELET # BLD AUTO: 364 X10E3/UL (ref 150–379)
POTASSIUM SERPL-SCNC: 4.4 MMOL/L (ref 3.5–5.2)
PROT SERPL-MCNC: 7.1 G/DL (ref 6–8.5)
QUANTIFERON TB GOLD (INCUBATED): NORMAL
QUANTIFERON TB2 AG VALUE: 0.05 IU/ML
RBC # BLD AUTO: 3.89 X10E6/UL (ref 3.77–5.28)
SERVICE CMNT-IMP: NORMAL
SODIUM SERPL-SCNC: 138 MMOL/L (ref 134–144)
TSH SERPL DL<=0.005 MIU/L-ACNC: 2.72 UIU/ML (ref 0.45–4.5)
WBC # BLD AUTO: 5.3 X10E3/UL (ref 3.4–10.8)

## 2019-03-11 ENCOUNTER — PATIENT MESSAGE (OUTPATIENT)
Dept: RHEUMATOLOGY | Facility: CLINIC | Age: 49
End: 2019-03-11

## 2019-03-11 DIAGNOSIS — E55.9 VITAMIN D DEFICIENCY DISEASE: ICD-10-CM

## 2019-03-11 DIAGNOSIS — Z79.52 LONG TERM SYSTEMIC STEROID USER: ICD-10-CM

## 2019-03-11 DIAGNOSIS — R53.83 FATIGUE, UNSPECIFIED TYPE: ICD-10-CM

## 2019-03-11 DIAGNOSIS — D64.9 ANEMIA, UNSPECIFIED TYPE: ICD-10-CM

## 2019-03-11 DIAGNOSIS — M05.79 RHEUMATOID ARTHRITIS INVOLVING MULTIPLE SITES WITH POSITIVE RHEUMATOID FACTOR: Primary | ICD-10-CM

## 2019-03-11 DIAGNOSIS — D84.9 IMMUNOSUPPRESSION: ICD-10-CM

## 2019-03-13 DIAGNOSIS — M05.79 RHEUMATOID ARTHRITIS INVOLVING MULTIPLE SITES WITH POSITIVE RHEUMATOID FACTOR: ICD-10-CM

## 2019-03-14 RX ORDER — PREDNISONE 2.5 MG/1
TABLET ORAL
Qty: 90 TABLET | Refills: 0 | Status: SHIPPED | OUTPATIENT
Start: 2019-03-14 | End: 2019-04-30 | Stop reason: SDUPTHER

## 2019-03-21 DIAGNOSIS — M05.79 RHEUMATOID ARTHRITIS INVOLVING MULTIPLE SITES WITH POSITIVE RHEUMATOID FACTOR: ICD-10-CM

## 2019-03-21 RX ORDER — FOLIC ACID 1 MG/1
TABLET ORAL
Qty: 30 TABLET | Refills: 2 | Status: SHIPPED | OUTPATIENT
Start: 2019-03-21 | End: 2019-06-25 | Stop reason: SDUPTHER

## 2019-04-01 DIAGNOSIS — M05.79 RHEUMATOID ARTHRITIS INVOLVING MULTIPLE SITES WITH POSITIVE RHEUMATOID FACTOR: ICD-10-CM

## 2019-04-02 RX ORDER — IBUPROFEN 800 MG/1
TABLET ORAL
Qty: 90 TABLET | Refills: 0 | Status: SHIPPED | OUTPATIENT
Start: 2019-04-02 | End: 2019-11-02 | Stop reason: SDUPTHER

## 2019-04-08 DIAGNOSIS — M05.79 RHEUMATOID ARTHRITIS INVOLVING MULTIPLE SITES WITH POSITIVE RHEUMATOID FACTOR: ICD-10-CM

## 2019-04-09 RX ORDER — METHOTREXATE 2.5 MG/1
TABLET ORAL
Qty: 24 TABLET | Refills: 0 | Status: SHIPPED | OUTPATIENT
Start: 2019-04-09 | End: 2019-05-07 | Stop reason: SDUPTHER

## 2019-04-14 ENCOUNTER — TELEPHONE (OUTPATIENT)
Dept: RHEUMATOLOGY | Facility: CLINIC | Age: 49
End: 2019-04-14

## 2019-04-14 DIAGNOSIS — D50.9 MICROCYTIC ANEMIA: Primary | ICD-10-CM

## 2019-04-14 LAB
BASOPHILS # BLD AUTO: 0 X10E3/UL (ref 0–0.2)
BASOPHILS NFR BLD AUTO: 0 %
EOSINOPHIL # BLD AUTO: 0.1 X10E3/UL (ref 0–0.4)
EOSINOPHIL NFR BLD AUTO: 1 %
ERYTHROCYTE [DISTWIDTH] IN BLOOD BY AUTOMATED COUNT: 19.9 % (ref 12.3–15.4)
HCT VFR BLD AUTO: 29 % (ref 34–46.6)
HGB BLD-MCNC: 9 G/DL (ref 11.1–15.9)
IMM GRANULOCYTES # BLD AUTO: 0 X10E3/UL (ref 0–0.1)
IMM GRANULOCYTES NFR BLD AUTO: 0 %
LYMPHOCYTES # BLD AUTO: 1.2 X10E3/UL (ref 0.7–3.1)
LYMPHOCYTES NFR BLD AUTO: 18 %
MCH RBC QN AUTO: 23.2 PG (ref 26.6–33)
MCHC RBC AUTO-ENTMCNC: 31 G/DL (ref 31.5–35.7)
MCV RBC AUTO: 75 FL (ref 79–97)
MONOCYTES # BLD AUTO: 0.6 X10E3/UL (ref 0.1–0.9)
MONOCYTES NFR BLD AUTO: 8 %
NEUTROPHILS # BLD AUTO: 4.9 X10E3/UL (ref 1.4–7)
NEUTROPHILS NFR BLD AUTO: 73 %
PLATELET # BLD AUTO: 360 X10E3/UL (ref 150–379)
RBC # BLD AUTO: 3.88 X10E6/UL (ref 3.77–5.28)
WBC # BLD AUTO: 6.7 X10E3/UL (ref 3.4–10.8)

## 2019-04-15 ENCOUNTER — PATIENT MESSAGE (OUTPATIENT)
Dept: RHEUMATOLOGY | Facility: CLINIC | Age: 49
End: 2019-04-15

## 2019-04-24 ENCOUNTER — PATIENT MESSAGE (OUTPATIENT)
Dept: ADMINISTRATIVE | Facility: HOSPITAL | Age: 49
End: 2019-04-24

## 2019-04-24 ENCOUNTER — TELEPHONE (OUTPATIENT)
Dept: ADMINISTRATIVE | Facility: HOSPITAL | Age: 49
End: 2019-04-24

## 2019-04-24 NOTE — TELEPHONE ENCOUNTER
The patient was phoned about scheduling her annual/HTN visit due to not visiting in over a year. We did schedule her for 4/25/19    Tamra IRVIN LPN  Clinical Care Coordinator  Internal Medicine  [facility]

## 2019-04-25 ENCOUNTER — OFFICE VISIT (OUTPATIENT)
Dept: INTERNAL MEDICINE | Facility: CLINIC | Age: 49
End: 2019-04-25
Attending: INTERNAL MEDICINE
Payer: COMMERCIAL

## 2019-04-25 VITALS
DIASTOLIC BLOOD PRESSURE: 90 MMHG | BODY MASS INDEX: 41.4 KG/M2 | OXYGEN SATURATION: 98 % | HEART RATE: 62 BPM | HEIGHT: 64 IN | WEIGHT: 242.5 LBS | SYSTOLIC BLOOD PRESSURE: 138 MMHG

## 2019-04-25 DIAGNOSIS — Z00.00 ANNUAL PHYSICAL EXAM: Primary | ICD-10-CM

## 2019-04-25 DIAGNOSIS — Z12.39 BREAST CANCER SCREENING: ICD-10-CM

## 2019-04-25 DIAGNOSIS — R06.83 SNORING: ICD-10-CM

## 2019-04-25 DIAGNOSIS — Z79.631 ON METHOTREXATE THERAPY: ICD-10-CM

## 2019-04-25 DIAGNOSIS — Z79.64 ON HYDROXYUREA THERAPY: ICD-10-CM

## 2019-04-25 DIAGNOSIS — Z79.899 LONG-TERM USE OF PLAQUENIL: ICD-10-CM

## 2019-04-25 DIAGNOSIS — R40.0 INTERMITTENT SLEEPINESS: ICD-10-CM

## 2019-04-25 DIAGNOSIS — I10 ESSENTIAL HYPERTENSION: ICD-10-CM

## 2019-04-25 DIAGNOSIS — E66.01 CLASS 3 SEVERE OBESITY WITH SERIOUS COMORBIDITY AND BODY MASS INDEX (BMI) OF 40.0 TO 44.9 IN ADULT, UNSPECIFIED OBESITY TYPE: ICD-10-CM

## 2019-04-25 DIAGNOSIS — Z12.11 COLON CANCER SCREENING: ICD-10-CM

## 2019-04-25 DIAGNOSIS — Z79.52 LONG TERM SYSTEMIC STEROID USER: ICD-10-CM

## 2019-04-25 DIAGNOSIS — E55.9 VITAMIN D DEFICIENCY DISEASE: ICD-10-CM

## 2019-04-25 DIAGNOSIS — D64.9 ANEMIA, UNSPECIFIED TYPE: ICD-10-CM

## 2019-04-25 PROCEDURE — 99999 PR PBB SHADOW E&M-EST. PATIENT-LVL V: ICD-10-PCS | Mod: PBBFAC,,, | Performed by: INTERNAL MEDICINE

## 2019-04-25 PROCEDURE — 3075F PR MOST RECENT SYSTOLIC BLOOD PRESS GE 130-139MM HG: ICD-10-PCS | Mod: CPTII,S$GLB,, | Performed by: INTERNAL MEDICINE

## 2019-04-25 PROCEDURE — 99396 PR PREVENTIVE VISIT,EST,40-64: ICD-10-PCS | Mod: S$GLB,,, | Performed by: INTERNAL MEDICINE

## 2019-04-25 PROCEDURE — 3080F PR MOST RECENT DIASTOLIC BLOOD PRESSURE >= 90 MM HG: ICD-10-PCS | Mod: CPTII,S$GLB,, | Performed by: INTERNAL MEDICINE

## 2019-04-25 PROCEDURE — 3080F DIAST BP >= 90 MM HG: CPT | Mod: CPTII,S$GLB,, | Performed by: INTERNAL MEDICINE

## 2019-04-25 PROCEDURE — 99999 PR PBB SHADOW E&M-EST. PATIENT-LVL V: CPT | Mod: PBBFAC,,, | Performed by: INTERNAL MEDICINE

## 2019-04-25 PROCEDURE — 3075F SYST BP GE 130 - 139MM HG: CPT | Mod: CPTII,S$GLB,, | Performed by: INTERNAL MEDICINE

## 2019-04-25 PROCEDURE — 99396 PREV VISIT EST AGE 40-64: CPT | Mod: S$GLB,,, | Performed by: INTERNAL MEDICINE

## 2019-04-25 RX ORDER — AMLODIPINE BESYLATE 5 MG/1
5 TABLET ORAL DAILY
Qty: 90 TABLET | Refills: 2 | Status: SHIPPED | OUTPATIENT
Start: 2019-04-25 | End: 2020-01-20

## 2019-04-25 NOTE — PROGRESS NOTES
"Subjective:       Patient ID: Antonio Ruiz is a 49 y.o. female.    Chief Complaint: Annual Exam    Here for annual visit  Last seen 1.5 years prior      -HTN-Above goal. Adherent with meds. No exercise. BMI 41. Crowded airway on exam. Wakes several times nightly. Wakes herself snoring. HA 3 times a week. No migrainous features.    -RA-Seen by Dr Luna 01/2019 in rheumatology. The patient currently takes Methotrexate 10 mg weekly, folic acid daily, prednisone 10 mg daily, sulfasalazine 1500 mg twice a day and Plaquenil.     Anemia on labs. Repeat 1 month later stable. Denies unexplained weight loss, dysphagia or sensation of things sticking in throat, BRBPR, melena, night sweats. Due for colonoscopy. Still having menstrual periods that are moderate to heavy flow. Not currently taking iron supplementation. Adherent with folic acid daily while on MTX.                Review of Systems   Constitutional: Negative for chills, fatigue, fever and unexpected weight change.   HENT: Negative for ear pain, hearing loss, postnasal drip, tinnitus, trouble swallowing and voice change.    Respiratory: Negative for cough, chest tightness, shortness of breath and wheezing.    Cardiovascular: Negative for chest pain, palpitations and leg swelling.   Gastrointestinal: Negative for abdominal pain, blood in stool, diarrhea, nausea and vomiting.   Endocrine: Negative for polydipsia, polyphagia and polyuria.   Genitourinary: Negative for difficulty urinating, dysuria, hematuria and vaginal bleeding.   Musculoskeletal: Positive for arthralgias.   Skin: Negative for rash.   Allergic/Immunologic: Negative for food allergies.   Neurological: Negative for dizziness, numbness and headaches.   Hematological: Does not bruise/bleed easily.   Psychiatric/Behavioral: The patient is not nervous/anxious.        Objective:      Vitals:    04/25/19 0807   BP: (!) 138/90   Pulse: 62   SpO2: 98%   Weight: 110 kg (242 lb 8.1 oz)   Height: 5' 4" " (1.626 m)      Physical Exam   Constitutional: She is oriented to person, place, and time. She appears well-developed and well-nourished. No distress.   HENT:   Head: Normocephalic and atraumatic.   Mouth/Throat: Oropharynx is clear and moist. No oropharyngeal exudate.   Eyes: Pupils are equal, round, and reactive to light. Conjunctivae and EOM are normal. No scleral icterus.   Neck: No thyromegaly present.   Cardiovascular: Normal rate, regular rhythm and normal heart sounds.   No murmur heard.  Pulmonary/Chest: Effort normal and breath sounds normal. She has no wheezes. She has no rales.   Abdominal: Soft. She exhibits no distension. There is no tenderness.   Musculoskeletal: She exhibits no edema or tenderness.   Lymphadenopathy:     She has no cervical adenopathy.   Neurological: She is alert and oriented to person, place, and time.   Skin: Skin is warm and dry.   Psychiatric: She has a normal mood and affect. Her behavior is normal.       Assessment:       1. Annual physical exam    2. Essential hypertension    3. Vitamin D deficiency disease    4. Class 3 severe obesity with serious comorbidity and body mass index (BMI) of 40.0 to 44.9 in adult, unspecified obesity type    5. Long term systemic steroid user    6. Anemia, unspecified type    7. On methotrexate therapy    8. Breast cancer screening    9. Colon cancer screening    10. Intermittent sleepiness    11. Snoring    12. On hydroxyurea therapy    13. Long-term use of Plaquenil        Plan:       Antonio was seen today for annual exam.    Diagnoses and all orders for this visit:    Annual physical exam  -     Comprehensive metabolic panel; Future  -     Lipid panel; Future  -     TSH; Future  -     Hemoglobin A1c; Future  -     CBC auto differential; Future  -     Comprehensive metabolic panel  -     Lipid panel  -     TSH  -     Hemoglobin A1c  -     CBC auto differential    Essential hypertension   -     amLODIPine (NORVASC) 5 MG tablet; Take 1  tablet (5 mg total) by mouth once daily.   f/u in 3-4 weeks for nurse visit for BP check  -     Comprehensive metabolic panel; Future  -     Comprehensive metabolic panel  -     Hypertension Digital Medicine (HDMP) Enrollment Order  -     Hypertension Digital Medicine ([facility]): Assign Onboarding Questionnaires    Vitamin D deficiency disease    Class 3 severe obesity with serious comorbidity and body mass index (BMI) of 40.0 to 44.9 in adult, unspecified obesity type   Patient should eat food, not too much, and most should be vegetables and lean protein. Discussed goal of weight loss to be accomplished by calorie restriction to approx 4524-9144 calories/day. Cumulative psychical activity throughout your day does increase weight loss.  Vary sources in diet (ie different colored vegetables) along with adequate fiber discussed. Consistent and sustainable practices are key. Will review diet periodically to scan for potential for vitamin deficiencies.   Discussed a minimum exercise goal of moderate paced walking or similar level of activity for 30-45 consecutive minutes 4-5 times a week for cardiovascular benefit and overall reduction in morbidity and mortality.      Long term systemic steroid user   Pt given paper Rx to take to our pharmacy down stairs for following vaccinations: Shingrix    Anemia, unspecified type   likely AOCD with minor JUSTINO component in setting of menstruation. Anemia labs. No upper GI symptoms. Due for screening colonoscopy.   -     Ferritin; Future  -     Iron and TIBC; Future  -     Vitamin B12; Future  -     Folate; Future  -     Ferritin  -     Iron and TIBC  -     Vitamin B12  -     Folate    On methotrexate therapy  -     Folate; Future  -     Folate    Breast cancer screening  -     Mammo Digital Screening Bilat w/ Dimitry; Future  -     Mammo Digital Screening Bilat w/ Dimitry    Colon cancer screening  -     Case request GI: COLONOSCOPY    Intermittent sleepiness  -     Ambulatory referral to  Sleep Disorders    Snoring   High pretest probability for SANDRA  -     Ambulatory referral to Sleep Disorders    On hydroxyurea therapy    Long-term use of Plaquenil  -     Ambulatory Referral to Optometry    RTC in 6 months or sooner prn                      Side effects of medication(s) were discussed in detail and patient voiced understanding.  Patient will call back for any issues or complications.

## 2019-04-25 NOTE — PATIENT INSTRUCTIONS
Low-Salt Diet  This diet removes foods that are high in salt. It also limits the amount of salt you use when cooking. It is most often used for people with high blood pressure, edema (fluid retention), and kidney, liver, or heart disease.  Table salt contains the mineral sodium. Your body needs sodium to work normally. But too much sodium can make your health problems worse. Your healthcare provider is recommending a low-salt (also called low-sodium) diet for you. Your total daily allowance of salt is 1,500 to 2,300 milligrams (mg). It is less than 1 teaspoon of table salt. This means you can have only about 500 to 700 mg of sodium at each meal. People with certain health problems should limit salt intake to the lower end of the recommended range.    When you cook, dont add much salt. If you can cook without using salt, even better. Dont add salt to your food at the table.  When shopping, read food labels. Salt is often called sodium on the label. Choose foods that are salt-free, low salt, or very low salt. Note that foods with reduced salt may not lower your salt intake enough.    Beans, potatoes, and pasta  Ok: Dry beans, split peas, lentils, potatoes, rice, macaroni, pasta, spaghetti without added salt  Avoid: Potato chips, tortilla chips, and similar products  Breads and cereals  Ok: Low-sodium breads, rolls, cereals, and cakes; low-salt crackers, matzo crackers  Avoid: Salted crackers, pretzels, popcorn, Turkmen toast, pancakes, muffins  Dairy  Ok: Milk, chocolate milk, hot chocolate mix, low-salt cheeses, and yogurt  Avoid: Processed cheese and cheese spreads; Roquefort, Camembert, and cottage cheese; buttermilk, instant breakfast drink  Desserts  Ok: Ice cream, frozen yogurt, juice bars, gelatin, cookies and pies, sugar, honey, jelly, hard candy  Avoid: Most pies, cakes and cookies prepared or processed with salt; instant pudding  Drinks  Ok: Tea, coffee, fizzy (carbonated) drinks, juices  Avoid: Flavored  coffees, electrolyte replacement drinks, sports drinks  Meats  Ok: All fresh meat, fish, poultry, low-salt tuna, eggs, egg substitute  Avoid: Smoked, pickled, brine-cured, or salted meats and fish. This includes palma, chipped beef, corned beef, hot dogs, deli meats, ham, kosher meats, salt pork, sausage, canned tuna, salted codfish, smoked salmon, herring, sardines, or anchovies.  Seasonings and spices  Ok: Most seasonings are okay. Good substitutes for salt include: fresh herb blends, hot sauce, lemon, garlic, foster, vinegar, dry mustard, parsley, cilantro, horseradish, tomato paste, regular margarine, mayonnaise, unsalted butter, cream cheese, vegetable oil, cream, low-salt salad dressing and gravy.  Avoid: Regular ketchup, relishes, pickles, soy sauce, teriyaki sauce, Worcestershire sauce, BBQ sauce, tartar sauce, meat tenderizer, chili sauce, regular gravy, regular salad dressing, salted butter  Soups  Ok: Low-salt soups and broths made with allowed foods  Avoid: Bouillon cubes, soups with smoked or salted meats, regular soup and broth  Vegetables  Ok: Most vegetables are okay; also low-salt tomato and vegetable juices  Avoid: Sauerkraut and other brine-soaked vegetables; pickles and other pickled vegetables; tomato juice, olives  Date Last Reviewed: 8/1/2016 © 2000-2017 [employer]. [address] [place] [address]. All rights reserved. This information is not intended as a substitute for professional medical care. Always follow your healthcare professional's instructions.        Taking Amlodipine  Amlodipine (gr-LN-bm-peen) is a calcium channel blocker. It helps relax your blood vessels and get more blood and oxygen to your heart. Relaxing the blood vessels also helps lower your blood pressure and relieve any chest pain you may have.     Each time you take your medicine, jamie it on the calendar so you won't forget.     Medication tips  · Read the fact sheet that comes with your  medicine. It tells you when and how to take it. Ask for a sheet if you dont get one.  · Take your medicine at the same time each day. If it upsets your stomach, take it with food or milk.  · If you miss a dose, take it as soon as you remember -- unless it is almost time for your next dose. If so, skip the missed dose. Do not take a double dose.  · Call your doctor or pharmacist if you have any questions about taking your medicine.  · Take your medicine even if you feel well. Most people with high blood pressure dont feel sick.  For your safety  · Ask your doctor or pharmacist if there are any foods or medicines you should avoid.  · To prevent dizziness, get up slowly after sitting or lying down.  · Do not stop taking your medicine unless your doctor tells you to. Doing so can make your condition worse. When stopping this medicine, the dose may need to be slowly decreased.  · Tell your doctor or pharmacist before taking any other prescription or over-the-counter medicines. This includes vitamin or mineral supplements and herbal remedies.  · Talk to your doctor or pharmacist about whether drinking alcohol is safe while taking amlodipine.  · Be sure to refill your prescription before you run out.  · Do not share your medicine with anyone.  · Ask your doctor how often you should have your blood pressure checked.  When to seek medical advice  Call your healthcare provider right away if any of these occur:  · You notice swelling in your ankles or feet or your skin flushes  · You have a headache or nausea  · You feel tired or weak  · You have severe dizziness  · You feel chest pain  · You have trouble breathing  · You develop a skin rash or itching   Date Last Reviewed: 6/1/2016  © 0433-5818 [employer]. [address] [place] [address], [place] [address]. All rights reserved. This information is not intended as a substitute for professional medical care. Always follow your healthcare professional's  instructions.

## 2019-04-26 ENCOUNTER — PATIENT MESSAGE (OUTPATIENT)
Dept: ADMINISTRATIVE | Facility: OTHER | Age: 49
End: 2019-04-26

## 2019-04-26 ENCOUNTER — PATIENT MESSAGE (OUTPATIENT)
Dept: INTERNAL MEDICINE | Facility: CLINIC | Age: 49
End: 2019-04-26

## 2019-04-26 ENCOUNTER — TELEPHONE (OUTPATIENT)
Dept: INTERNAL MEDICINE | Facility: CLINIC | Age: 49
End: 2019-04-26

## 2019-04-26 NOTE — TELEPHONE ENCOUNTER
Left voice message for patient to schedule appointment from referral to [facility].  Sher FAUSTIN  [phone]

## 2019-04-30 DIAGNOSIS — M05.79 RHEUMATOID ARTHRITIS INVOLVING MULTIPLE SITES WITH POSITIVE RHEUMATOID FACTOR: ICD-10-CM

## 2019-04-30 RX ORDER — PREDNISONE 2.5 MG/1
TABLET ORAL
Qty: 90 TABLET | Refills: 0 | Status: SHIPPED | OUTPATIENT
Start: 2019-04-30 | End: 2019-06-17 | Stop reason: SDUPTHER

## 2019-05-06 ENCOUNTER — PATIENT MESSAGE (OUTPATIENT)
Dept: ADMINISTRATIVE | Facility: HOSPITAL | Age: 49
End: 2019-05-06

## 2019-05-06 ENCOUNTER — PATIENT MESSAGE (OUTPATIENT)
Dept: INTERNAL MEDICINE | Facility: CLINIC | Age: 49
End: 2019-05-06

## 2019-05-07 DIAGNOSIS — M05.79 RHEUMATOID ARTHRITIS INVOLVING MULTIPLE SITES WITH POSITIVE RHEUMATOID FACTOR: ICD-10-CM

## 2019-05-07 RX ORDER — METHOTREXATE 2.5 MG/1
TABLET ORAL
Qty: 24 TABLET | Refills: 0 | Status: SHIPPED | OUTPATIENT
Start: 2019-05-07 | End: 2019-06-05 | Stop reason: SDUPTHER

## 2019-05-09 ENCOUNTER — CLINICAL SUPPORT (OUTPATIENT)
Dept: INTERNAL MEDICINE | Facility: CLINIC | Age: 49
End: 2019-05-09
Payer: COMMERCIAL

## 2019-05-09 VITALS — DIASTOLIC BLOOD PRESSURE: 90 MMHG | HEART RATE: 93 BPM | OXYGEN SATURATION: 99 % | SYSTOLIC BLOOD PRESSURE: 122 MMHG

## 2019-05-09 PROCEDURE — 99999 PR PBB SHADOW E&M-EST. PATIENT-LVL III: CPT | Mod: PBBFAC,,,

## 2019-05-09 PROCEDURE — 99999 PR PBB SHADOW E&M-EST. PATIENT-LVL III: ICD-10-PCS | Mod: PBBFAC,,,

## 2019-05-09 NOTE — PROGRESS NOTES
Antonio Ruiz 48 y.o. female is here today for Blood Pressure check.   History of HTN yes.    Review of patient's allergies indicates:   Allergen Reactions    Humira [adalimumab] Rash     Humira caused drug-induced lupus     Creatinine   Date Value Ref Range Status   03/06/2019 0.64 0.57 - 1.00 mg/dL Final     Sodium   Date Value Ref Range Status   03/06/2019 138 134 - 144 mmol/L Final     Potassium   Date Value Ref Range Status   03/06/2019 4.4 3.5 - 5.2 mmol/L Final   ]  Patient verifies taking blood pressure medications on a regular bases at the same time of the day.     Current Outpatient Medications:     amLODIPine (NORVASC) 5 MG tablet, Take 1 tablet (5 mg total) by mouth once daily., Disp: 90 tablet, Rfl: 2    hydroCHLOROthiazide (HYDRODIURIL) 25 MG tablet, TAKE 1 TABLET(25 MG) BY MOUTH EVERY DAY, Disp: 90 tablet, Rfl: 2    losartan (COZAAR) 100 MG tablet, TAKE 1 TABLET(100 MG) BY MOUTH EVERY DAY, Disp: 90 tablet, Rfl: 2    flu vac [id number] 36mos up,PF, 60 mcg (15 mcg x 4)/0.5 mL Syrg, 0.5 mLs., Disp: 0.5 mL, Rfl: 0    folic acid (FOLVITE) 1 MG tablet, TAKE 1 TABLET(1 MG) BY MOUTH EVERY DAY, Disp: 30 tablet, Rfl: 2    hydroxychloroquine (PLAQUENIL) 200 mg tablet, TAKE 1 TABLET(200 MG) BY MOUTH TWICE DAILY, Disp: 60 tablet, Rfl: 2    ibuprofen (ADVIL,MOTRIN) 800 MG tablet, TAKE 1 TABLET BY MOUTH THREE TIMES DAILY EVERY 8 HOURS, Disp: 90 tablet, Rfl: 0    methotrexate 2.5 MG Tab, TAKE 6 TABLETS(15 MG) BY MOUTH EVERY 7 DAYS, Disp: 24 tablet, Rfl: 0    predniSONE (DELTASONE) 2.5 MG tablet, TAKE 3 TABLETS(7.5 MG) BY MOUTH EVERY DAY, Disp: 90 tablet, Rfl: 0    sulfaSALAzine (AZULFIDINE) 500 MG TbEC, TAKE 3 TABLETS BY MOUTH TWICE DAILY, Disp: 540 tablet, Rfl: 0    triamcinolone acetonide 0.1% (KENALOG) 0.1 % cream, AAA TWICE DAILY x 1-2 wks then prn flares only, Disp: 80 g, Rfl: 1  Does patient have record of home blood pressure readings no. Pt states she will [facility] when [facility] to set up  htn program.   Last dose of blood pressure medication was taken at 6:00am.  Patient is asymptomatic.     BP: (!) 122/90 , Pulse: 93.  Pt had another appointment to go to today so didn't want to wait another 15 minutes to recheck.    Dr. Mari notified.

## 2019-05-13 DIAGNOSIS — Z13.5 ENCOUNTER FOR SCREENING FOR DIABETIC RETINOPATHY: Primary | ICD-10-CM

## 2019-05-13 DIAGNOSIS — Z12.11 ENCOUNTER FOR FECAL IMMUNOCHEMICAL TEST SCREENING: ICD-10-CM

## 2019-05-19 LAB
ALBUMIN SERPL-MCNC: 3.7 G/DL (ref 3.5–5.5)
ALBUMIN/GLOB SERPL: 1.1 {RATIO} (ref 1.2–2.2)
ALP SERPL-CCNC: 55 IU/L (ref 39–117)
ALT SERPL-CCNC: 8 IU/L (ref 0–32)
AST SERPL-CCNC: 12 IU/L (ref 0–40)
BASOPHILS # BLD AUTO: 0 X10E3/UL (ref 0–0.2)
BASOPHILS NFR BLD AUTO: 0 %
BILIRUB SERPL-MCNC: <0.2 MG/DL (ref 0–1.2)
BUN SERPL-MCNC: 13 MG/DL (ref 6–24)
BUN/CREAT SERPL: 18 (ref 9–23)
CALCIUM SERPL-MCNC: 8.9 MG/DL (ref 8.7–10.2)
CHLORIDE SERPL-SCNC: 103 MMOL/L (ref 96–106)
CHOLEST SERPL-MCNC: 181 MG/DL (ref 100–199)
CO2 SERPL-SCNC: 21 MMOL/L (ref 20–29)
CREAT SERPL-MCNC: 0.74 MG/DL (ref 0.57–1)
EOSINOPHIL # BLD AUTO: 0.1 X10E3/UL (ref 0–0.4)
EOSINOPHIL NFR BLD AUTO: 1 %
ERYTHROCYTE [DISTWIDTH] IN BLOOD BY AUTOMATED COUNT: 20.7 % (ref 12.3–15.4)
FERRITIN SERPL-MCNC: 5 NG/ML (ref 15–150)
FOLATE SERPL-MCNC: 17.1 NG/ML
GLOBULIN SER CALC-MCNC: 3.4 G/DL (ref 1.5–4.5)
GLUCOSE SERPL-MCNC: 96 MG/DL (ref 65–99)
HBA1C MFR BLD: 5.7 % (ref 4.8–5.6)
HCT VFR BLD AUTO: 30.8 % (ref 34–46.6)
HDLC SERPL-MCNC: 83 MG/DL
HGB BLD-MCNC: 9.4 G/DL (ref 11.1–15.9)
IMM GRANULOCYTES # BLD AUTO: 0 X10E3/UL (ref 0–0.1)
IMM GRANULOCYTES NFR BLD AUTO: 0 %
IRON SATN MFR SERPL: 5 % (ref 15–55)
IRON SERPL-MCNC: 19 UG/DL (ref 27–159)
LDLC SERPL CALC-MCNC: 88 MG/DL (ref 0–99)
LYMPHOCYTES # BLD AUTO: 1.2 X10E3/UL (ref 0.7–3.1)
LYMPHOCYTES NFR BLD AUTO: 19 %
MCH RBC QN AUTO: 23.1 PG (ref 26.6–33)
MCHC RBC AUTO-ENTMCNC: 30.5 G/DL (ref 31.5–35.7)
MCV RBC AUTO: 76 FL (ref 79–97)
MONOCYTES # BLD AUTO: 0.5 X10E3/UL (ref 0.1–0.9)
MONOCYTES NFR BLD AUTO: 8 %
NEUTROPHILS # BLD AUTO: 4.5 X10E3/UL (ref 1.4–7)
NEUTROPHILS NFR BLD AUTO: 72 %
PLATELET # BLD AUTO: 341 X10E3/UL (ref 150–379)
POTASSIUM SERPL-SCNC: 4.1 MMOL/L (ref 3.5–5.2)
PROT SERPL-MCNC: 7.1 G/DL (ref 6–8.5)
RBC # BLD AUTO: 4.07 X10E6/UL (ref 3.77–5.28)
SODIUM SERPL-SCNC: 139 MMOL/L (ref 134–144)
TIBC SERPL-MCNC: 422 UG/DL (ref 250–450)
TRIGL SERPL-MCNC: 51 MG/DL (ref 0–149)
TSH SERPL DL<=0.005 MIU/L-ACNC: 3.52 UIU/ML (ref 0.45–4.5)
UIBC SERPL-MCNC: 403 UG/DL (ref 131–425)
VIT B12 SERPL-MCNC: 517 PG/ML (ref 232–1245)
VLDLC SERPL CALC-MCNC: 10 MG/DL (ref 5–40)
WBC # BLD AUTO: 6.3 X10E3/UL (ref 3.4–10.8)

## 2019-05-21 ENCOUNTER — PATIENT OUTREACH (OUTPATIENT)
Dept: OTHER | Facility: OTHER | Age: 49
End: 2019-05-21

## 2019-05-21 NOTE — PROGRESS NOTES
1st attempt to complete enrollment call. No answer, left voicemail.       Last 5 Patient Entered Readings                                      Current 30 Day Average: 149/90     Recent Readings 5/21/2019 5/20/2019    SBP (mmHg) 144 154    DBP (mmHg) 88 91    Pulse 68 66

## 2019-05-21 NOTE — LETTER
May 30, 2019     Antonio Ruiz  [address]  [facility] [address]       Dear Antonio,    Welcome to [facility]! Our goal is to make care effective, proactive and convenient by using data you send us from home to better treat your chronic conditions.          My name is Moira Bravo, and I am your dedicated Digital Medicine clinician. As an expert in medication management, I will help ensure that the medications you are taking continue to provide the intended benefits and help you reach your goals. You can reach me directly at [phone] or by sending me a message directly through your [facility] account.      I am Sidra Montana and I will be your health . My job is to help you identify lifestyle changes to improve your disease control. We will talk about nutrition, exercise, and other ways you may be able to adjust your current habits to better your health. Additionally, we will help ensure you are completing the tests and screenings that are necessary to help manage your conditions. You can reach me directly at [phone] or by sending me a message directly through your [facility] account.    Most importantly, YOU are at the center of this team. Together, we will work to improve your overall health and encourage you to meet your goals for a healthier lifestyle.     What we expect from YOU:  · Please take frequent home blood pressure measurements. We ask that you take at least 1 blood pressure reading per week, but more information will better help us get you know you. Be sure you rest for a few minutes before taking the reading in a quiet, comfortable place.     Be available to receive phone calls or [facility] messages, when appropriate, from your care team. Please let us know if there are any specific days or times that work best for us to reach you via phone.     Complete routine tests and screenings. Dont worry, we will help keep you on track!           What you should  expect from your Digital Medicine Care Team:   We will work with you to create a personalized plan of care and provide you with encouragement and education, including regarding lifestyle changes, that could help you manage your disease states.     We will adjust your current medications, if needed, and continue to monitor your long-term progress.     We will provide you and your physician with monthly progress reports after you have been in the program for more than 30 days.     We will send you reminders through [facility] and text messages to help ensure you do not miss any testing deadlines to help manage your disease states.    You will be able to reach us by phone or through your [facility] account by clicking our names under Care Team on the right side of the home screen.    I look forward to working with you to achieve your blood pressure goals!    We look forward to working with you to help manage your health,    Sincerely,    Your Digital Medicine Team    Please visit our websites to learn more:   · Hypertension: [email or web address]      Remember, we are not available for emergencies. If you have an emergency, please contact your doctors office directly or call [facility] on-call ([phone] or [phone]) or [phone].

## 2019-05-30 NOTE — PROGRESS NOTES
Digital Medicine Enrollment Call    Introduced Mrs. Antonio Ruiz to Digital Medicine.     Discussed program expectations and requirements.    Introduced digital medicine care team.     Reviewed the importance of self-monitoring for digital medicine participation.     Reviewed that the Digital Medicine team is not available for emergencies and instructed the patient to call 911 or [facility] ([phone] or [phone]) if one arises.          Last 5 Patient Entered Readings                                      Current 30 Day Average: 147/88     Recent Readings 5/28/2019 5/23/2019 5/22/2019 5/21/2019 5/20/2019    SBP (mmHg) 148 145 142 144 154    DBP (mmHg) 85 83 91 88 91    Pulse 69 68 72 68 66

## 2019-06-03 ENCOUNTER — PATIENT OUTREACH (OUTPATIENT)
Dept: OTHER | Facility: OTHER | Age: 49
End: 2019-06-03

## 2019-06-03 NOTE — PROGRESS NOTES
Last 5 Patient Entered Readings                                      Current 30 Day Average: 147/88     Recent Readings 5/28/2019 5/23/2019 5/22/2019 5/21/2019 5/20/2019    SBP (mmHg) 148 145 142 144 154    DBP (mmHg) 85 83 91 88 91    Pulse 69 68 72 68 66          Digital Medicine: Health  Introduction    Introduced Ms. Antonio Ruiz to Digital Medicine. Discussed health  role and recommended lifestyle modifications.    Lifestyle Assessment:  Current Dietary Habits(i.e. low sodium, food labels, dining out): deferred  Exercise:deferred  Alcohol/Tobacco: Patient does not smoke. Patient will have about 1-3 alcoholic beverages a week.  Medication Adherence: has been compliant with the medicaiton regimen    Reviewed AHA/AACE recommendations:  Limit sodium intake to <2000mg/day  Perform 150 minutes of physical activity per week    Reviewed the importance of self-monitoring, medication adherence, and that the health  can be used as a resource for lifestyle modifications to help reduce or maintain a healthy lifestyle.  Reviewed that the Digital Medicine team is not available for emergencies and instructed the patient to call 911 or [facility] ([phone] or [phone]) if one arises.    Patient demonstrates understanding of how to properly take BP. Patient will normally take BP in the morning, about 30 minutes after BP medications.

## 2019-06-05 DIAGNOSIS — M05.79 RHEUMATOID ARTHRITIS INVOLVING MULTIPLE SITES WITH POSITIVE RHEUMATOID FACTOR: ICD-10-CM

## 2019-06-05 RX ORDER — METHOTREXATE 2.5 MG/1
TABLET ORAL
Qty: 24 TABLET | Refills: 0 | Status: SHIPPED | OUTPATIENT
Start: 2019-06-05 | End: 2019-07-03 | Stop reason: SDUPTHER

## 2019-06-12 RX ORDER — HYDROXYCHLOROQUINE SULFATE 200 MG/1
TABLET, FILM COATED ORAL
Qty: 60 TABLET | Refills: 0 | Status: SHIPPED | OUTPATIENT
Start: 2019-06-12 | End: 2019-07-18 | Stop reason: SDUPTHER

## 2019-06-17 DIAGNOSIS — M05.79 RHEUMATOID ARTHRITIS INVOLVING MULTIPLE SITES WITH POSITIVE RHEUMATOID FACTOR: ICD-10-CM

## 2019-06-17 RX ORDER — PREDNISONE 2.5 MG/1
TABLET ORAL
Qty: 90 TABLET | Refills: 0 | Status: SHIPPED | OUTPATIENT
Start: 2019-06-17 | End: 2019-08-24 | Stop reason: SDUPTHER

## 2019-06-20 ENCOUNTER — PATIENT OUTREACH (OUTPATIENT)
Dept: OTHER | Facility: OTHER | Age: 49
End: 2019-06-20

## 2019-06-20 NOTE — PROGRESS NOTES
Last 5 Patient Entered Readings                                      Current 30 Day Average: 139/85     Recent Readings 6/19/2019 6/14/2019 6/14/2019 6/10/2019 6/4/2019    SBP (mmHg) 135 142 147 125 133    DBP (mmHg) 80 88 84 82 82    Pulse 63 73 66 72 67            Digital Medicine: Health  Follow Up    Left voicemail to follow up with Ms. Antonio Ruiz.  Current BP average 139/85 mmHg is not at goal, 130/80.

## 2019-06-25 DIAGNOSIS — M05.79 RHEUMATOID ARTHRITIS INVOLVING MULTIPLE SITES WITH POSITIVE RHEUMATOID FACTOR: ICD-10-CM

## 2019-06-25 RX ORDER — FOLIC ACID 1 MG/1
TABLET ORAL
Qty: 30 TABLET | Refills: 0 | Status: SHIPPED | OUTPATIENT
Start: 2019-06-25 | End: 2019-07-03 | Stop reason: SDUPTHER

## 2019-06-27 NOTE — PROGRESS NOTES
Last 5 Patient Entered Readings                                      Current 30 Day Average: 133/82     Recent Readings 6/26/2019 6/24/2019 6/22/2019 6/19/2019 6/14/2019    SBP (mmHg) 122 139 121 135 142    DBP (mmHg) 75 86 74 80 88    Pulse 67 65 80 63 73          Digital Medicine: Health  Follow Up    Left voicemail to follow up with Ms. Antonio Ruiz.  Current BP average 133/82 mmHg is not at goal,130/80

## 2019-07-01 ENCOUNTER — PATIENT MESSAGE (OUTPATIENT)
Dept: RHEUMATOLOGY | Facility: CLINIC | Age: 49
End: 2019-07-01

## 2019-07-01 DIAGNOSIS — R76.8 POSITIVE ANA (ANTINUCLEAR ANTIBODY): ICD-10-CM

## 2019-07-01 DIAGNOSIS — M05.79 RHEUMATOID ARTHRITIS INVOLVING MULTIPLE SITES WITH POSITIVE RHEUMATOID FACTOR: Primary | ICD-10-CM

## 2019-07-01 DIAGNOSIS — E55.9 VITAMIN D DEFICIENCY DISEASE: ICD-10-CM

## 2019-07-01 DIAGNOSIS — D84.9 IMMUNOSUPPRESSION: ICD-10-CM

## 2019-07-01 DIAGNOSIS — R53.83 FATIGUE, UNSPECIFIED TYPE: ICD-10-CM

## 2019-07-03 ENCOUNTER — PATIENT MESSAGE (OUTPATIENT)
Dept: RHEUMATOLOGY | Facility: CLINIC | Age: 49
End: 2019-07-03

## 2019-07-03 ENCOUNTER — HOSPITAL ENCOUNTER (OUTPATIENT)
Dept: RADIOLOGY | Facility: HOSPITAL | Age: 49
Discharge: HOME | End: 2019-07-03
Attending: INTERNAL MEDICINE
Payer: COMMERCIAL

## 2019-07-03 ENCOUNTER — OFFICE VISIT (OUTPATIENT)
Dept: RHEUMATOLOGY | Facility: CLINIC | Age: 49
End: 2019-07-03
Payer: COMMERCIAL

## 2019-07-03 VITALS
DIASTOLIC BLOOD PRESSURE: 87 MMHG | HEART RATE: 59 BPM | BODY MASS INDEX: 44.3 KG/M2 | SYSTOLIC BLOOD PRESSURE: 136 MMHG | HEIGHT: 63 IN | WEIGHT: 250 LBS

## 2019-07-03 DIAGNOSIS — M54.41 ACUTE RIGHT-SIDED LOW BACK PAIN WITH RIGHT-SIDED SCIATICA: ICD-10-CM

## 2019-07-03 DIAGNOSIS — D64.9 ANEMIA, UNSPECIFIED TYPE: ICD-10-CM

## 2019-07-03 DIAGNOSIS — T50.905A DRUG-INDUCED LUPUS ERYTHEMATOSUS: ICD-10-CM

## 2019-07-03 DIAGNOSIS — L93.2 DRUG-INDUCED LUPUS ERYTHEMATOSUS: ICD-10-CM

## 2019-07-03 DIAGNOSIS — R53.83 FATIGUE, UNSPECIFIED TYPE: ICD-10-CM

## 2019-07-03 DIAGNOSIS — D84.9 IMMUNOSUPPRESSION: ICD-10-CM

## 2019-07-03 DIAGNOSIS — E55.9 VITAMIN D DEFICIENCY DISEASE: ICD-10-CM

## 2019-07-03 DIAGNOSIS — Z79.52 LONG TERM SYSTEMIC STEROID USER: ICD-10-CM

## 2019-07-03 DIAGNOSIS — M05.79 RHEUMATOID ARTHRITIS INVOLVING MULTIPLE SITES WITH POSITIVE RHEUMATOID FACTOR: Primary | ICD-10-CM

## 2019-07-03 LAB
APPEARANCE UR: CLEAR
BILIRUB UR QL STRIP: NEGATIVE
COLOR UR: YELLOW
CREAT UR-MCNC: 101 MG/DL
GLUCOSE UR QL: NEGATIVE
HGB UR QL STRIP: NEGATIVE
KETONES UR QL STRIP: NEGATIVE
LEUKOCYTE ESTERASE UR QL STRIP: NEGATIVE
MICRO URNS: NORMAL
NITRITE UR QL STRIP: NEGATIVE
PH UR STRIP: 6 [PH] (ref 5–7.5)
PROT UR QL STRIP: NEGATIVE
PROT UR-MCNC: 34.2 MG/DL
PROT/CREAT UR: 339 MG/G CREAT (ref 0–200)
SP GR UR: 1.02 (ref 1–1.03)
UROBILINOGEN UR STRIP-MCNC: 0.2 MG/DL (ref 0.2–1)

## 2019-07-03 PROCEDURE — 3008F BODY MASS INDEX DOCD: CPT | Mod: CPTII,S$GLB,, | Performed by: INTERNAL MEDICINE

## 2019-07-03 PROCEDURE — 99999 PR PBB SHADOW E&M-EST. PATIENT-LVL III: CPT | Mod: PBBFAC,,, | Performed by: INTERNAL MEDICINE

## 2019-07-03 PROCEDURE — 3075F SYST BP GE 130 - 139MM HG: CPT | Mod: CPTII,S$GLB,, | Performed by: INTERNAL MEDICINE

## 2019-07-03 PROCEDURE — 72100 X-RAY EXAM L-S SPINE 2/3 VWS: CPT | Mod: TC

## 2019-07-03 PROCEDURE — 99999 PR PBB SHADOW E&M-EST. PATIENT-LVL III: ICD-10-PCS | Mod: PBBFAC,,, | Performed by: INTERNAL MEDICINE

## 2019-07-03 PROCEDURE — 3079F DIAST BP 80-89 MM HG: CPT | Mod: CPTII,S$GLB,, | Performed by: INTERNAL MEDICINE

## 2019-07-03 PROCEDURE — 72100 XR LUMBAR SPINE AP AND LATERAL: ICD-10-PCS | Mod: 26,,, | Performed by: RADIOLOGY

## 2019-07-03 PROCEDURE — 99215 OFFICE O/P EST HI 40 MIN: CPT | Mod: S$GLB,,, | Performed by: INTERNAL MEDICINE

## 2019-07-03 PROCEDURE — 99215 PR OFFICE/OUTPT VISIT, EST, LEVL V, 40-54 MIN: ICD-10-PCS | Mod: S$GLB,,, | Performed by: INTERNAL MEDICINE

## 2019-07-03 PROCEDURE — 3079F PR MOST RECENT DIASTOLIC BLOOD PRESSURE 80-89 MM HG: ICD-10-PCS | Mod: CPTII,S$GLB,, | Performed by: INTERNAL MEDICINE

## 2019-07-03 PROCEDURE — 3075F PR MOST RECENT SYSTOLIC BLOOD PRESS GE 130-139MM HG: ICD-10-PCS | Mod: CPTII,S$GLB,, | Performed by: INTERNAL MEDICINE

## 2019-07-03 PROCEDURE — 72100 X-RAY EXAM L-S SPINE 2/3 VWS: CPT | Mod: 26,,, | Performed by: RADIOLOGY

## 2019-07-03 PROCEDURE — 3008F PR BODY MASS INDEX (BMI) DOCUMENTED: ICD-10-PCS | Mod: CPTII,S$GLB,, | Performed by: INTERNAL MEDICINE

## 2019-07-03 RX ORDER — FOLIC ACID 1 MG/1
2 TABLET ORAL DAILY
Qty: 30 TABLET | Refills: 2 | Status: SHIPPED | OUTPATIENT
Start: 2019-07-03 | End: 2019-07-24 | Stop reason: SDUPTHER

## 2019-07-03 RX ORDER — METHOTREXATE 2.5 MG/1
TABLET ORAL
Qty: 32 TABLET | Refills: 2 | Status: SHIPPED | OUTPATIENT
Start: 2019-07-03 | End: 2019-09-26 | Stop reason: SDUPTHER

## 2019-07-03 ASSESSMENT — ROUTINE ASSESSMENT OF PATIENT INDEX DATA (RAPID3)
PAIN SCORE: .5
PSYCHOLOGICAL DISTRESS SCORE: 0
AM STIFFNESS SCORE: 0, NO
PATIENT GLOBAL ASSESSMENT SCORE: 0
MDHAQ FUNCTION SCORE: .1
FATIGUE SCORE: .5
TOTAL RAPID3 SCORE: .28

## 2019-07-03 NOTE — PROGRESS NOTES
Rapid3 Question Responses and Scores 7/1/2019   MDHAQ Score 0.1   Psychologic Score 0   Pain Score 0.5   When you awakened in the morning OVER THE LAST WEEK, did you feel stiff? No   Fatigue Score 0.5   Global Health Score 0   RAPID3 Score 0.27       Answers for HPI/ROS submitted by the patient on 7/1/2019   fever: No  eye redness: No  headaches: No  shortness of breath: No  chest pain: No  trouble swallowing: No  diarrhea: No  constipation: No  unexpected weight change: No  genital sore: No  dysuria: No  During the last 3 days, have you had a skin rash?: Yes  Bruises or bleeds easily: No  cough: Yes

## 2019-07-03 NOTE — PROGRESS NOTES
"Subjective:       Patient ID: Antonio Ruiz is a 49 y.o. female.    Chief Complaint: Rheumatoid arthritis    HPI:  Antonio Ruiz is a 49 y.o. female with a history   of rheumatoid arthritis manifested by symmetrical small joint involvement,   history of elbow contracture on the right in the past, positive rheumatoid   factor, positive WILMA. Humira stopped due to rash and pain.  Orencia did not help.  The patient currently takes Methotrexate 15 mg weekly, folic acid daily, prednisone 5 mg daily, sulfasalazine 1500 mg twice a day and Plaquenil 200 mg bid.  Misses evening SSZ.  Unable to taper prednisone below 5 mg.    She reports couple seconds morning stiffness.       Notes pain across back yesterday 10/10 ache today 4/10 ache.  Did have more frequent urination.   3 weeks of rash on right upper arm.          Review of Systems   Constitutional: Positive for fatigue. Negative for fever and unexpected weight change (prednisone causing weight gain).   HENT: Negative.  Negative for trouble swallowing.    Eyes: Negative.  Negative for redness.   Respiratory: Negative.  Negative for cough and shortness of breath.    Cardiovascular: Negative.  Negative for chest pain.   Gastrointestinal: Negative.  Negative for constipation and diarrhea.   Endocrine: Negative.    Genitourinary: Negative.  Negative for dysuria and genital sores.   Musculoskeletal: Positive for arthralgias.   Skin: Negative for rash.   Allergic/Immunologic: Negative.    Neurological: Negative.  Negative for headaches.   Hematological: Negative.  Does not bruise/bleed easily.   Psychiatric/Behavioral: Negative.          Objective:   /87   Pulse (!) 59   Ht 5' 3" (1.6 m)   Wt 113.4 kg (250 lb)   BMI 44.29 kg/m²      Physical Exam   Constitutional: She is oriented to person, place, and time and well-developed, well-nourished, and in no distress.   HENT:   Head: Normocephalic and atraumatic.   Eyes: Conjunctivae and EOM are normal.   Neck: " Neck supple.   Cardiovascular: Normal rate, regular rhythm and normal heart sounds.    Pulmonary/Chest: Effort normal and breath sounds normal.   Abdominal: Soft. Bowel sounds are normal.   Neurological: She is alert and oriented to person, place, and time. No cranial nerve deficit. Gait normal.   Skin: Skin is warm and dry.     Psychiatric: Mood and affect normal.   Musculoskeletal: She exhibits no edema, tenderness or deformity.   28 joint count: 0 swollen and 1 tender (right wrist)  No MTP tenderness bilaterally  No pain on top of feet       LABS    Component      Latest Ref Rng & Units 7/2/2019   WBC      3.4 - 10.8 x10E3/uL 6.4   RBC      3.77 - 5.28 x10E6/uL 4.26   Hemoglobin      11.1 - 15.9 g/dL 9.8 (L)   Hematocrit      34.0 - 46.6 % 32.2 (L)   MCV      79 - 97 fL 76 (L)   MCH      26.6 - 33.0 pg 23.0 (L)   MCHC      31.5 - 35.7 g/dL 30.4 (L)   RDW      12.3 - 15.4 % 20.9 (H)   Platelets      150 - 450 x10E3/uL 318   Neutrophils      Not Estab. % 80   Lymph%      Not Estab. % 12   Mono%      Not Estab. % 6   Eosinophil%      Not Estab. % 2   Basophil%      Not Estab. % 0   Neutrophils Absolute      1.4 - 7.0 x10E3/uL 5.2   Lymph #      0.7 - 3.1 x10E3/uL 0.8   Mono #      0.1 - 0.9 x10E3/uL 0.4   Eos #      0.0 - 0.4 x10E3/uL 0.1   Baso #      0.0 - 0.2 x10E3/uL 0.0   Immature Granulocytes      Not Estab. % 0   Immature Grans (Abs)      0.0 - 0.1 x10E3/uL 0.0   Glucose      65 - 99 mg/dL 83   BUN, Bld      6 - 24 mg/dL 14   Creatinine      0.57 - 1.00 mg/dL 0.77   eGFR if non African American      >59 mL/min/1.73 90   eGFR if African American      >59 mL/min/1.73 103   BUN/Creatinine Ratio      9 - 23 18   Sodium      134 - 144 mmol/L 140   Potassium      3.5 - 5.2 mmol/L 4.5   Chloride      96 - 106 mmol/L 100   CO2      20 - 29 mmol/L 18 (L)   Calcium      8.7 - 10.2 mg/dL 9.5   PROTEIN TOTAL      6.0 - 8.5 g/dL 7.9   Albumin      3.5 - 5.5 g/dL 4.4   Globulin, Total      1.5 - 4.5 g/dL 3.5    Albumin/Globulin Ratio      1.2 - 2.2 1.3   BILIRUBIN TOTAL      0.0 - 1.2 mg/dL <0.2   Alkaline Phosphatase      39 - 117 IU/L 62   AST      0 - 40 IU/L 20   ALT      0 - 32 IU/L 10   Specific Gravity, UA      1.005 - 1.030 1.023   pH, UA      5.0 - 7.5 6.0   Color, UA      Yellow Yellow   Clarity, UA      Clear Clear   Leukocytes, UA      Negative Negative   Protein, UA      Negative/Trace Negative   Glucose, UA      Negative Negative   Ketones, UA      Negative Negative   Occult Blood UA      Negative Negative   Bilirubin, UA      Negative Negative   UROBILINOGEN UA      0.2 - 1.0 mg/dL 0.2   NITRITE UA      Negative Negative   Microscopic Examination       Comment   CRP      0 - 10 mg/L 2   Vit D, 25-Hydroxy      30.0 - 100.0 ng/mL 16.7 (L)   C4 Complement      14 - 44 mg/dL 39   C3 Complement      82 - 167 mg/dL 131   Total CK      24 - 173 U/L 117   Sed Rate      0 - 40 mm/hr 52 (H)        Assessment:       1. RA. Failed Humira and Orencia.  She is on prednisone 5 mg, Plaquenil, methotrexate and SSZ.  Compliant with SSS.  Feet hurt intermittently.  No further right wrist.   2. 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 Rash and Positive WILMA/SSA/ Matthew.  Previously +DS DNA and normal complements.  Repeat labs in one month.   3. Alopecia.  Schedule follow up with Dr. Martines  4. Leg edema bilateral after starting Norvasc  5. Diarrhea with medications.  with noncoated SSZ  6. Obesity  7. Osteoporosis prophylaxis.  8. HTN.  Elevated BP.  Patient to follow to primary doctor  9. Right arm lesion.  Similar lesion treated with a foam by dermatology  10.  Vitamin D deficiency  11.  Back pain        Plan:       1.  Labs  2.  Increase MTX 20 mg qweek.  Repeat labs in one month.  Patient encouraged to be compliant.  Continue prednisone 5 mg daily for now and once on MTX decrease to 2.5 mg.    If no improvement increase MTX to 20 mg.   3.  Consider Xeljanz.  Hold on Rituxan for now.    4.  Follow up with Dr. Martines in  dermatology  5.  Calcium 1,000 mg daily and vitamin D3 5,000 units daily.         Patient seen face to face for 25 minutes and greater than 50% spent in counseling regarding rituxan and side effects

## 2019-07-04 LAB
25(OH)D3+25(OH)D2 SERPL-MCNC: 16.7 NG/ML (ref 30–100)
ALBUMIN SERPL-MCNC: 4.4 G/DL (ref 3.5–5.5)
ALBUMIN/GLOB SERPL: 1.3 {RATIO} (ref 1.2–2.2)
ALP SERPL-CCNC: 62 IU/L (ref 39–117)
ALT SERPL-CCNC: 10 IU/L (ref 0–32)
AST SERPL-CCNC: 20 IU/L (ref 0–40)
BASOPHILS # BLD AUTO: 0 X10E3/UL (ref 0–0.2)
BASOPHILS NFR BLD AUTO: 0 %
BILIRUB SERPL-MCNC: <0.2 MG/DL (ref 0–1.2)
BUN SERPL-MCNC: 14 MG/DL (ref 6–24)
BUN/CREAT SERPL: 18 (ref 9–23)
C3 SERPL-MCNC: 131 MG/DL (ref 82–167)
C4 SERPL-MCNC: 39 MG/DL (ref 14–44)
CALCIUM SERPL-MCNC: 9.5 MG/DL (ref 8.7–10.2)
CHLORIDE SERPL-SCNC: 100 MMOL/L (ref 96–106)
CK SERPL-CCNC: 117 U/L (ref 24–173)
CO2 SERPL-SCNC: 18 MMOL/L (ref 20–29)
CREAT SERPL-MCNC: 0.77 MG/DL (ref 0.57–1)
CRP SERPL-MCNC: 2 MG/L (ref 0–10)
DSDNA AB SER-ACNC: <1 IU/ML (ref 0–9)
EOSINOPHIL # BLD AUTO: 0.1 X10E3/UL (ref 0–0.4)
EOSINOPHIL NFR BLD AUTO: 2 %
ERYTHROCYTE [DISTWIDTH] IN BLOOD BY AUTOMATED COUNT: 20.9 % (ref 12.3–15.4)
ERYTHROCYTE [SEDIMENTATION RATE] IN BLOOD BY WESTERGREN METHOD: 52 MM/HR (ref 0–40)
GLOBULIN SER CALC-MCNC: 3.5 G/DL (ref 1.5–4.5)
GLUCOSE SERPL-MCNC: 83 MG/DL (ref 65–99)
HCT VFR BLD AUTO: 32.2 % (ref 34–46.6)
HGB BLD-MCNC: 9.8 G/DL (ref 11.1–15.9)
IMM GRANULOCYTES # BLD AUTO: 0 X10E3/UL (ref 0–0.1)
IMM GRANULOCYTES NFR BLD AUTO: 0 %
LYMPHOCYTES # BLD AUTO: 0.8 X10E3/UL (ref 0.7–3.1)
LYMPHOCYTES NFR BLD AUTO: 12 %
MCH RBC QN AUTO: 23 PG (ref 26.6–33)
MCHC RBC AUTO-ENTMCNC: 30.4 G/DL (ref 31.5–35.7)
MCV RBC AUTO: 76 FL (ref 79–97)
MONOCYTES # BLD AUTO: 0.4 X10E3/UL (ref 0.1–0.9)
MONOCYTES NFR BLD AUTO: 6 %
NEUTROPHILS # BLD AUTO: 5.2 X10E3/UL (ref 1.4–7)
NEUTROPHILS NFR BLD AUTO: 80 %
PLATELET # BLD AUTO: 318 X10E3/UL (ref 150–450)
POTASSIUM SERPL-SCNC: 4.5 MMOL/L (ref 3.5–5.2)
PROT SERPL-MCNC: 7.9 G/DL (ref 6–8.5)
RBC # BLD AUTO: 4.26 X10E6/UL (ref 3.77–5.28)
SODIUM SERPL-SCNC: 140 MMOL/L (ref 134–144)
WBC # BLD AUTO: 6.4 X10E3/UL (ref 3.4–10.8)

## 2019-07-11 NOTE — PROGRESS NOTES
Last 5 Patient Entered Readings                                      Current 30 Day Average: 132/81     Recent Readings 7/1/2019 6/26/2019 6/24/2019 6/22/2019 6/19/2019    SBP (mmHg) 135 122 139 121 135    DBP (mmHg) 81 75 86 74 80    Pulse 63 67 65 80 63          Digital Medicine: Health  Follow Up    Lifestyle Modifications:    1.Dietary Modifications (Sodium intake <2,000mg/day, food labels, dining out): Patient reports that she's been trying to cut back on sodium.    2.Physical Activity: Patient reports that she walks at work, but knows she could be better at exercising. She reports that she has a treadmill, stairstepper, and ab equipment at home. She said that when she gets home after work she's too tired to do anything else. Encouraged her to walk more at work. She decided to set a 6000 step/day goal.    3.Medication Therapy: Patient has been compliant with the medication regimen.    4.Patient has the following medication side effects/concerns: none  (Frequency/Alleviating factors/Precipitating factors, etc.)     Follow up with Ms. Antonio Ruiz completed. Ms. Ruiz reports doing well. No further questions or concerns. Will continue to follow up to achieve health goals.

## 2019-07-18 ENCOUNTER — TELEPHONE (OUTPATIENT)
Dept: RHEUMATOLOGY | Facility: CLINIC | Age: 49
End: 2019-07-18

## 2019-07-18 RX ORDER — HYDROXYCHLOROQUINE SULFATE 200 MG/1
TABLET, FILM COATED ORAL
Qty: 60 TABLET | Refills: 0 | Status: SHIPPED | OUTPATIENT
Start: 2019-07-18 | End: 2019-08-21 | Stop reason: SDUPTHER

## 2019-07-22 RX ORDER — SULFASALAZINE 500 MG/1
TABLET, DELAYED RELEASE ORAL
Qty: 540 TABLET | Refills: 0 | Status: SHIPPED | OUTPATIENT
Start: 2019-07-22 | End: 2019-10-31 | Stop reason: SDUPTHER

## 2019-07-24 ENCOUNTER — PATIENT MESSAGE (OUTPATIENT)
Dept: RHEUMATOLOGY | Facility: CLINIC | Age: 49
End: 2019-07-24

## 2019-07-24 DIAGNOSIS — M05.79 RHEUMATOID ARTHRITIS INVOLVING MULTIPLE SITES WITH POSITIVE RHEUMATOID FACTOR: ICD-10-CM

## 2019-07-24 RX ORDER — FOLIC ACID 1 MG/1
2 TABLET ORAL DAILY
Qty: 60 TABLET | Refills: 2 | Status: SHIPPED | OUTPATIENT
Start: 2019-07-24 | End: 2019-11-05

## 2019-07-30 ENCOUNTER — PATIENT MESSAGE (OUTPATIENT)
Dept: INTERNAL MEDICINE | Facility: CLINIC | Age: 49
End: 2019-07-30

## 2019-07-30 ENCOUNTER — PATIENT MESSAGE (OUTPATIENT)
Dept: RHEUMATOLOGY | Facility: CLINIC | Age: 49
End: 2019-07-30

## 2019-07-30 DIAGNOSIS — L93.2 DRUG-INDUCED LUPUS ERYTHEMATOSUS: ICD-10-CM

## 2019-07-30 DIAGNOSIS — T50.905A DRUG-INDUCED LUPUS ERYTHEMATOSUS: ICD-10-CM

## 2019-07-31 RX ORDER — TRIAMCINOLONE ACETONIDE 1 MG/G
CREAM TOPICAL
Qty: 80 G | Refills: 1 | Status: SHIPPED | OUTPATIENT
Start: 2019-07-31 | End: 2020-11-03

## 2019-07-31 NOTE — TELEPHONE ENCOUNTER
Makes sure staying hydrated everyday.moisturice with hypo allergenic creams like Cerave. I will also send in cream for her to use BID for minimum of 2 weeks.

## 2019-08-06 ENCOUNTER — PATIENT OUTREACH (OUTPATIENT)
Dept: OTHER | Facility: OTHER | Age: 49
End: 2019-08-06

## 2019-08-06 NOTE — PROGRESS NOTES
Last 5 Patient Entered Readings                                      Current 30 Day Average: 139/86     Recent Readings 8/6/2019 7/31/2019 7/30/2019 7/25/2019 7/23/2019    SBP (mmHg) 129 147 148 134 144    DBP (mmHg) 80 88 93 85 88    Pulse 70 70 69 62 67        Antonio is newly enrolled in the Hypertension Digital Medicine Program.         Patient's BP average is 139/86 mmHg which is above goal of <130/80 per 2017 ACC/AHA guidelines. **    Hypertension Medications             amLODIPine (NORVASC) 5 MG tablet Take 1 tablet (5 mg total) by mouth once daily.    hydroCHLOROthiazide (HYDRODIURIL) 25 MG tablet TAKE 1 TABLET(25 MG) BY MOUTH EVERY DAY    losartan (COZAAR) 100 MG tablet TAKE 1 TABLET(100 MG) BY MOUTH EVERY DAY        NA, unable to leave voicemail due to mail box being full. Will continue to monitor and call patient back in the coming weeks if she does not call me back first.   BP is not at goal - most recent reading was at goal. Will plan to discuss compliance and request an increase in readings.

## 2019-08-12 ENCOUNTER — PATIENT OUTREACH (OUTPATIENT)
Dept: OTHER | Facility: OTHER | Age: 49
End: 2019-08-12

## 2019-08-12 NOTE — PROGRESS NOTES
Last 5 Patient Entered Readings                                      Current 30 Day Average: 139/86     Recent Readings 8/8/2019 8/6/2019 7/31/2019 7/30/2019 7/25/2019    SBP (mmHg) 137 129 147 148 134    DBP (mmHg) 84 80 88 93 85    Pulse 73 70 70 69 62          Patient was not available to talk at this time. Requested a call back tomorrow.

## 2019-08-13 NOTE — PROGRESS NOTES
Last 5 Patient Entered Readings                                      Current 30 Day Average: 140/86     Recent Readings 8/13/2019 8/8/2019 8/6/2019 7/31/2019 7/30/2019    SBP (mmHg) 144 137 129 147 148    DBP (mmHg) 84 84 80 88 93    Pulse 69 73 70 70 69        Patient was not available to talk at this time. Requested a call back tomorrow.

## 2019-08-15 NOTE — PROGRESS NOTES
Last 5 Patient Entered Readings                                      Current 30 Day Average: 139/85     Recent Readings 8/14/2019 8/13/2019 8/8/2019 8/6/2019 7/31/2019    SBP (mmHg) 134 144 137 129 147    DBP (mmHg) 82 84 84 80 88    Pulse 67 69 73 70 70          Digital Medicine: Health  Follow Up    Lifestyle Modifications:    1.Dietary Modifications (Sodium intake <2,000mg/day, food labels, dining out): deferred    2.Physical Activity: Patient reports that she wish she had time to exercise. She just works very long days.  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 She reports that she is always on her feet and will even take the stairs instead of the elevator. She says that by the time she gets home, she has enough time to fix dinner and then she's ready for bed. We talked about when she could squeeze time in to exercise on her treadmill or stairstepper at home. Patient stated that sometimes they do left overs during the week. Encouraged her to plan 2 days a week to have leftovers, and instead of having cooking time, get on the treadmill for about 10-15 min. Patient agreed to this.     3.Medication Therapy: Patient has been compliant with the medication regimen.    4.Patient has the following medication side effects/concerns: none  (Frequency/Alleviating factors/Precipitating factors, etc.)     Follow up with Ms. Antonio Ruiz completed. Ms. Ruiz is doing well. She thinks that her readings are more elevated since it's the start of school right now. Encouraged her to take more readings. No further questions or concerns. Will continue to follow up to achieve health goals.

## 2019-08-21 RX ORDER — HYDROXYCHLOROQUINE SULFATE 200 MG/1
TABLET, FILM COATED ORAL
Qty: 60 TABLET | Refills: 2 | Status: SHIPPED | OUTPATIENT
Start: 2019-08-21 | End: 2019-11-24 | Stop reason: SDUPTHER

## 2019-08-22 NOTE — PROGRESS NOTES
Last 5 Patient Entered Readings                                      Current 30 Day Average: 140/86     Recent Readings 8/14/2019 8/13/2019 8/8/2019 8/6/2019 7/31/2019    SBP (mmHg) 134 144 137 129 147    DBP (mmHg) 82 84 84 80 88    Pulse 67 69 73 70 70          Antonio is newly enrolled in the Hypertension Digital Medicine Program.     Verified drug allergies.    Verified current medication list and reconciled.     Reviewed program goals and monitoring frequency. Explained that we expect patient to obtain several blood pressures per week at random times of the day. Confirmed appropriate BP monitoring technique. Reviewed signs and symptoms of hypertension (HA, CP, SOB, etc.) and hypotension (dizziness, weakness, fatigue, etc.).     Patient's BP average is 140/86 mmHg which is above goal of <130/80 per 2017 ACC/AHA guidelines. Patient attributes higher readings to: arthritis, not resting before taking a reading all of the time    Hypertension Medications             amLODIPine (NORVASC) 5 MG tablet Take 1 tablet (5 mg total) by mouth once daily.    hydroCHLOROthiazide (HYDRODIURIL) 25 MG tablet TAKE 1 TABLET(25 MG) BY MOUTH EVERY DAY    losartan (COZAAR) 100 MG tablet TAKE 1 TABLET(100 MG) BY MOUTH EVERY DAY        Plan:  Discussed increasing amlodipine, patient states she experienced hair loss from amlodipine 10 mg dosing.   Discussed interchange from losartan to valsartan for slightly tighter control, patient states she needs to read up on the medication and will let me know her thoughts.   Discussed in detail the need for tighter BP control. Patient voiced an understanding and states she will call me back within a week to discuss her options.

## 2019-08-24 DIAGNOSIS — M05.79 RHEUMATOID ARTHRITIS INVOLVING MULTIPLE SITES WITH POSITIVE RHEUMATOID FACTOR: ICD-10-CM

## 2019-08-26 RX ORDER — PREDNISONE 2.5 MG/1
TABLET ORAL
Qty: 90 TABLET | Refills: 0 | Status: SHIPPED | OUTPATIENT
Start: 2019-08-26 | End: 2020-04-03 | Stop reason: SDUPTHER

## 2019-09-10 ENCOUNTER — PATIENT OUTREACH (OUTPATIENT)
Dept: OTHER | Facility: OTHER | Age: 49
End: 2019-09-10

## 2019-09-17 DIAGNOSIS — I10 ESSENTIAL HYPERTENSION: ICD-10-CM

## 2019-09-17 RX ORDER — HYDROCHLOROTHIAZIDE 25 MG/1
TABLET ORAL
Qty: 90 TABLET | Refills: 2 | Status: SHIPPED | OUTPATIENT
Start: 2019-09-17 | End: 2020-06-22

## 2019-09-17 NOTE — PROGRESS NOTES
"Digital Medicine: Health  Follow-Up    The history is provided by the patient.     Follow Up  Follow-up reason(s): reading review and routine education      Routine Education Topics: eating patterns, macronutrient distribution and physical activity          Diet:       Patient reports that recently she's been trying to cut back on popcorn. She use to eat salted popcorn almost every day, but has tried to cut it down to 1x/week.     Also spoke to patient about water intake. She states that she only drinks water really to take her medications. Other than that, she really doesn't drink anything during the day. We talked about the importance of water in her daily life and encouraged her to drink more water. She's also on a diuretic and doesn't want to be in the bathroom all day. We agreed on trying to increase water after she's home from work.     Intervention(s): reducing sodium intake and increasing water intake    Physical Activity:   When asked if exercising, patient responded: no    Patient has not been able to start an exercise program at home on her treadmill. She does continue to walk a lot at work.     Medication Adherence:       Patient reports that she is "a little leary about any medication changes at this time". Encouraged her to work with the lifestyle modifications in the meantime to see if that helps bring her BP into goal range.      INTERVENTION(S)  recommended diet modifications, recommend physical activity and encouragement/support    PLAN  patient verbalizes understanding and patient amenable to changes          Topic    Eye Exam        Last 5 Patient Entered Readings                                      Current 30 Day Average: 138/85     Recent Readings 9/16/2019 9/12/2019 9/9/2019 9/5/2019 9/4/2019    SBP (mmHg) 142 137 140 146 145    DBP (mmHg) 84 88 84 89 92    Pulse 64 67 63 67 69                "

## 2019-09-26 DIAGNOSIS — M05.79 RHEUMATOID ARTHRITIS INVOLVING MULTIPLE SITES WITH POSITIVE RHEUMATOID FACTOR: ICD-10-CM

## 2019-09-26 RX ORDER — METHOTREXATE 2.5 MG/1
TABLET ORAL
Qty: 32 TABLET | Refills: 0 | Status: SHIPPED | OUTPATIENT
Start: 2019-09-26 | End: 2019-10-31 | Stop reason: SDUPTHER

## 2019-10-02 ENCOUNTER — PATIENT MESSAGE (OUTPATIENT)
Dept: INTERNAL MEDICINE | Facility: CLINIC | Age: 49
End: 2019-10-02

## 2019-10-02 ENCOUNTER — PATIENT MESSAGE (OUTPATIENT)
Dept: PHARMACY | Facility: CLINIC | Age: 49
End: 2019-10-02

## 2019-10-02 DIAGNOSIS — I10 ESSENTIAL HYPERTENSION: ICD-10-CM

## 2019-10-03 RX ORDER — LOSARTAN POTASSIUM 100 MG/1
TABLET ORAL
Qty: 90 TABLET | Refills: 2 | Status: SHIPPED | OUTPATIENT
Start: 2019-10-03 | End: 2019-10-24 | Stop reason: ALTCHOICE

## 2019-10-16 ENCOUNTER — PATIENT OUTREACH (OUTPATIENT)
Dept: OTHER | Facility: OTHER | Age: 49
End: 2019-10-16

## 2019-10-17 ENCOUNTER — PATIENT OUTREACH (OUTPATIENT)
Dept: ADMINISTRATIVE | Facility: OTHER | Age: 49
End: 2019-10-17

## 2019-10-21 ENCOUNTER — OFFICE VISIT (OUTPATIENT)
Dept: RHEUMATOLOGY | Facility: CLINIC | Age: 49
End: 2019-10-21
Payer: COMMERCIAL

## 2019-10-21 VITALS
HEIGHT: 63 IN | DIASTOLIC BLOOD PRESSURE: 95 MMHG | HEART RATE: 66 BPM | SYSTOLIC BLOOD PRESSURE: 151 MMHG | WEIGHT: 252.44 LBS | BODY MASS INDEX: 44.73 KG/M2

## 2019-10-21 DIAGNOSIS — M05.79 RHEUMATOID ARTHRITIS INVOLVING MULTIPLE SITES WITH POSITIVE RHEUMATOID FACTOR: Primary | ICD-10-CM

## 2019-10-21 DIAGNOSIS — L65.9 ALOPECIA: ICD-10-CM

## 2019-10-21 DIAGNOSIS — M25.471 RIGHT ANKLE SWELLING: ICD-10-CM

## 2019-10-21 DIAGNOSIS — M25.472 LEFT ANKLE SWELLING: ICD-10-CM

## 2019-10-21 DIAGNOSIS — E55.9 VITAMIN D DEFICIENCY DISEASE: ICD-10-CM

## 2019-10-21 DIAGNOSIS — R53.83 FATIGUE, UNSPECIFIED TYPE: ICD-10-CM

## 2019-10-21 DIAGNOSIS — E66.01 CLASS 3 SEVERE OBESITY WITH SERIOUS COMORBIDITY AND BODY MASS INDEX (BMI) OF 40.0 TO 44.9 IN ADULT, UNSPECIFIED OBESITY TYPE: ICD-10-CM

## 2019-10-21 PROCEDURE — 3008F BODY MASS INDEX DOCD: CPT | Mod: CPTII,S$GLB,, | Performed by: INTERNAL MEDICINE

## 2019-10-21 PROCEDURE — 99999 PR PBB SHADOW E&M-EST. PATIENT-LVL III: ICD-10-PCS | Mod: PBBFAC,,, | Performed by: INTERNAL MEDICINE

## 2019-10-21 PROCEDURE — 3077F PR MOST RECENT SYSTOLIC BLOOD PRESSURE >= 140 MM HG: ICD-10-PCS | Mod: CPTII,S$GLB,, | Performed by: INTERNAL MEDICINE

## 2019-10-21 PROCEDURE — 3077F SYST BP >= 140 MM HG: CPT | Mod: CPTII,S$GLB,, | Performed by: INTERNAL MEDICINE

## 2019-10-21 PROCEDURE — 99999 PR PBB SHADOW E&M-EST. PATIENT-LVL III: CPT | Mod: PBBFAC,,, | Performed by: INTERNAL MEDICINE

## 2019-10-21 PROCEDURE — 3080F DIAST BP >= 90 MM HG: CPT | Mod: CPTII,S$GLB,, | Performed by: INTERNAL MEDICINE

## 2019-10-21 PROCEDURE — 99214 PR OFFICE/OUTPT VISIT, EST, LEVL IV, 30-39 MIN: ICD-10-PCS | Mod: S$GLB,,, | Performed by: INTERNAL MEDICINE

## 2019-10-21 PROCEDURE — 3080F PR MOST RECENT DIASTOLIC BLOOD PRESSURE >= 90 MM HG: ICD-10-PCS | Mod: CPTII,S$GLB,, | Performed by: INTERNAL MEDICINE

## 2019-10-21 PROCEDURE — 3008F PR BODY MASS INDEX (BMI) DOCUMENTED: ICD-10-PCS | Mod: CPTII,S$GLB,, | Performed by: INTERNAL MEDICINE

## 2019-10-21 PROCEDURE — 99214 OFFICE O/P EST MOD 30 MIN: CPT | Mod: S$GLB,,, | Performed by: INTERNAL MEDICINE

## 2019-10-21 ASSESSMENT — ROUTINE ASSESSMENT OF PATIENT INDEX DATA (RAPID3)
PSYCHOLOGICAL DISTRESS SCORE: 0
PATIENT GLOBAL ASSESSMENT SCORE: 2
AM STIFFNESS SCORE: 1, YES
WHEN YOU AWAKENED IN THE MORNING OVER THE LAST WEEK, PLEASE INDICATE THE AMOUNT OF TIME IT TAKES UNTIL YOU ARE AS LIMBER AS YOU WILL BE FOR THE DAY: 2 MINUTES
TOTAL RAPID3 SCORE: 2.56
MDHAQ FUNCTION SCORE: .5
PAIN SCORE: 4
FATIGUE SCORE: 6

## 2019-10-21 NOTE — PROGRESS NOTES
"Subjective:       Patient ID: Antonio Ruiz is a 49 y.o. female.    Chief Complaint: Rheumatoid arthritis    HPI:  Antonio Ruiz is a 49 y.o. female with a history   of rheumatoid arthritis manifested by symmetrical small joint involvement,   history of elbow contracture on the right in the past, positive rheumatoid   factor, positive WILMA. Humira stopped due to rash and pain.  Orencia did not help.    The patient currently takes Methotrexate 20 mg weekly, folic acid daily, sulfasalazine 1500 mg twice a day and Plaquenil 200 mg bid.    Patient stopped prednisone 1 month ago and has been doing well off it.       Notes 4/10 ache in knees at times.   Morning stiffness of 2 minutes          Review of Systems   Constitutional: Positive for fatigue. Negative for fever and unexpected weight change (prednisone causing weight gain).   HENT: Negative.  Negative for trouble swallowing.    Eyes: Negative.  Negative for redness.   Respiratory: Positive for cough (green yellow mucus since Saturday morning; now fevers). Negative for shortness of breath.    Cardiovascular: Negative.  Negative for chest pain.   Gastrointestinal: Negative.  Negative for constipation and diarrhea.   Endocrine: Negative.    Genitourinary: Negative.  Negative for dysuria and genital sores.   Musculoskeletal: Positive for arthralgias.   Skin: Negative for rash.   Allergic/Immunologic: Negative.    Neurological: Negative.  Negative for headaches.   Hematological: Negative.  Does not bruise/bleed easily.   Psychiatric/Behavioral: Negative.          Objective:   BP (!) 151/95 (BP Location: Left arm, Patient Position: Sitting, BP Method: Large (Automatic))   Pulse 66   Ht 5' 3" (1.6 m)   Wt 114.5 kg (252 lb 6.8 oz)   BMI 44.72 kg/m²      Physical Exam   Constitutional: She is oriented to person, place, and time and well-developed, well-nourished, and in no distress.   HENT:   Head: Normocephalic and atraumatic.   Eyes: Conjunctivae and EOM are " normal.   Neck: Neck supple.   Cardiovascular: Normal rate, regular rhythm and normal heart sounds.    Pulmonary/Chest: Effort normal and breath sounds normal.   Abdominal: Soft. Bowel sounds are normal.   Neurological: She is alert and oriented to person, place, and time. No cranial nerve deficit. Gait normal.   Skin: Skin is warm and dry.     Psychiatric: Mood and affect normal.   Musculoskeletal: She exhibits no edema, tenderness or deformity.   28 joint count: 0 swollen and 1 tender (right wrist)  No MTP tenderness bilaterally  No pain on top of feet       LABS    Component      Latest Ref Rng & Units 7/2/2019   WBC      3.4 - 10.8 x10E3/uL 6.4   RBC      3.77 - 5.28 x10E6/uL 4.26   Hemoglobin      11.1 - 15.9 g/dL 9.8 (L)   Hematocrit      34.0 - 46.6 % 32.2 (L)   MCV      79 - 97 fL 76 (L)   MCH      26.6 - 33.0 pg 23.0 (L)   MCHC      31.5 - 35.7 g/dL 30.4 (L)   RDW      12.3 - 15.4 % 20.9 (H)   Platelets      150 - 450 x10E3/uL 318   Neutrophils      Not Estab. % 80   Lymph%      Not Estab. % 12   Mono%      Not Estab. % 6   Eosinophil%      Not Estab. % 2   Basophil%      Not Estab. % 0   Neutrophils Absolute      1.4 - 7.0 x10E3/uL 5.2   Lymph #      0.7 - 3.1 x10E3/uL 0.8   Mono #      0.1 - 0.9 x10E3/uL 0.4   Eos #      0.0 - 0.4 x10E3/uL 0.1   Baso #      0.0 - 0.2 x10E3/uL 0.0   Immature Granulocytes      Not Estab. % 0   Immature Grans (Abs)      0.0 - 0.1 x10E3/uL 0.0   Glucose      65 - 99 mg/dL 83   BUN, Bld      6 - 24 mg/dL 14   Creatinine      0.57 - 1.00 mg/dL 0.77   eGFR if non African American      >59 mL/min/1.73 90   eGFR if African American      >59 mL/min/1.73 103   BUN/Creatinine Ratio      9 - 23 18   Sodium      134 - 144 mmol/L 140   Potassium      3.5 - 5.2 mmol/L 4.5   Chloride      96 - 106 mmol/L 100   CO2      20 - 29 mmol/L 18 (L)   Calcium      8.7 - 10.2 mg/dL 9.5   PROTEIN TOTAL      6.0 - 8.5 g/dL 7.9   Albumin      3.5 - 5.5 g/dL 4.4   Globulin, Total      1.5 - 4.5 g/dL  3.5   Albumin/Globulin Ratio      1.2 - 2.2 1.3   BILIRUBIN TOTAL      0.0 - 1.2 mg/dL <0.2   Alkaline Phosphatase      39 - 117 IU/L 62   AST      0 - 40 IU/L 20   ALT      0 - 32 IU/L 10   Specific Gravity, UA      1.005 - 1.030 1.023   pH, UA      5.0 - 7.5 6.0   Color, UA      Yellow Yellow   Clarity, UA      Clear Clear   Leukocytes, UA      Negative Negative   Protein, UA      Negative/Trace Negative   Glucose, UA      Negative Negative   Ketones, UA      Negative Negative   Occult Blood UA      Negative Negative   Bilirubin, UA      Negative Negative   UROBILINOGEN UA      0.2 - 1.0 mg/dL 0.2   NITRITE UA      Negative Negative   Microscopic Examination       Comment   Creatinine, Random Ur      Not Estab. mg/dL 101.0   Protein Urine Random      Not Estab. mg/dL 34.2   Protein/Creat Ratio      0 - 200 mg/g creat 339 (H)   CRP      0 - 10 mg/L 2   Vit D, 25-Hydroxy      30.0 - 100.0 ng/mL 16.7 (L)   C4 Complement      14 - 44 mg/dL 39   C3 Complement      82 - 167 mg/dL 131   ds DNA Ab      0 - 9 IU/mL <1   Total CK      24 - 173 U/L 117   Sed Rate      0 - 40 mm/hr 52 (H)        Assessment:       1. RA. Failed Humira and Orencia.  She is on Plaquenil, methotrexate and SSZ.  Now with stiffness.  2. 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 Rash and Positive WILMA/SSA/ Matthew.  Previously +DS DNA and normal complements.  3. Alopecia.  Schedule follow up with Dr. Martines  4. Leg edema bilateral after starting Norvasc  5. Diarrhea with medications.  with noncoated SSZ  6. Obesity  7. Osteoporosis prophylaxis.  8. HTN.  Elevated BP.  Patient to follow to primary doctor  9. Right arm lesion.  Similar lesion treated with a foam by dermatology  10.  Vitamin D deficiency  11.  Back pain  12.  Anemia   13.  Cough      Plan:       1.  Labs  2.  Will hold MTX 20 mg qweek until cough resolves. Patient encouraged to be compliant.  Continue to hold prednisone.  If labs allow increase MTX to 25 mg to see if stiffness improves.      3.  Consider Xeljanz.  Hold on Rituxan for now.    4.  Follow up with Dr. Martines in dermatology  5.  Calcium 1,000 mg daily and vitamin D3 5,000 units daily.      RTO 3 months/prn

## 2019-10-22 ENCOUNTER — TELEPHONE (OUTPATIENT)
Dept: RHEUMATOLOGY | Facility: CLINIC | Age: 49
End: 2019-10-22

## 2019-10-22 NOTE — TELEPHONE ENCOUNTER
Patient informed that labs yesterday showed low glucose via email since unable to reach her by phone.  Patient instructed to be sure to avoid extensive fasting periods and follow with primary doctor.

## 2019-10-23 ENCOUNTER — PATIENT MESSAGE (OUTPATIENT)
Dept: RHEUMATOLOGY | Facility: CLINIC | Age: 49
End: 2019-10-23

## 2019-10-23 LAB
25(OH)D3+25(OH)D2 SERPL-MCNC: 12.6 NG/ML (ref 30–100)
ALBUMIN SERPL-MCNC: 4.2 G/DL (ref 3.5–5.5)
ALBUMIN/GLOB SERPL: 1.4 {RATIO} (ref 1.2–2.2)
ALP SERPL-CCNC: 62 IU/L (ref 39–117)
ALT SERPL-CCNC: 8 IU/L (ref 0–32)
AST SERPL-CCNC: 14 IU/L (ref 0–40)
BASOPHILS # BLD AUTO: 0 X10E3/UL (ref 0–0.2)
BASOPHILS NFR BLD AUTO: 0 %
BILIRUB SERPL-MCNC: 0.2 MG/DL (ref 0–1.2)
BNP SERPL-MCNC: 64.4 PG/ML (ref 0–100)
BUN SERPL-MCNC: 10 MG/DL (ref 6–24)
BUN/CREAT SERPL: 16 (ref 9–23)
CALCIUM SERPL-MCNC: 9.3 MG/DL (ref 8.7–10.2)
CHLORIDE SERPL-SCNC: 99 MMOL/L (ref 96–106)
CO2 SERPL-SCNC: 18 MMOL/L (ref 20–29)
CREAT SERPL-MCNC: 0.63 MG/DL (ref 0.57–1)
CRP SERPL-MCNC: 4 MG/L (ref 0–10)
EOSINOPHIL # BLD AUTO: 0.2 X10E3/UL (ref 0–0.4)
EOSINOPHIL NFR BLD AUTO: 2 %
ERYTHROCYTE [DISTWIDTH] IN BLOOD BY AUTOMATED COUNT: 21.1 % (ref 12.3–15.4)
ERYTHROCYTE [SEDIMENTATION RATE] IN BLOOD BY WESTERGREN METHOD: 27 MM/HR (ref 0–40)
FERRITIN SERPL-MCNC: 11 NG/ML (ref 15–150)
GLOBULIN SER CALC-MCNC: 3 G/DL (ref 1.5–4.5)
GLUCOSE SERPL-MCNC: 33 MG/DL (ref 65–99)
HCT VFR BLD AUTO: 34 % (ref 34–46.6)
HGB BLD-MCNC: 10.1 G/DL (ref 11.1–15.9)
IMM GRANULOCYTES # BLD AUTO: 0 X10E3/UL (ref 0–0.1)
IMM GRANULOCYTES NFR BLD AUTO: 0 %
IRON SATN MFR SERPL: 16 % (ref 15–55)
IRON SERPL-MCNC: 69 UG/DL (ref 27–159)
LYMPHOCYTES # BLD AUTO: 1.1 X10E3/UL (ref 0.7–3.1)
LYMPHOCYTES NFR BLD AUTO: 11 %
MCH RBC QN AUTO: 24.8 PG (ref 26.6–33)
MCHC RBC AUTO-ENTMCNC: 29.7 G/DL (ref 31.5–35.7)
MCV RBC AUTO: 83 FL (ref 79–97)
MONOCYTES # BLD AUTO: 1 X10E3/UL (ref 0.1–0.9)
MONOCYTES NFR BLD AUTO: 11 %
NEUTROPHILS # BLD AUTO: 7.5 X10E3/UL (ref 1.4–7)
NEUTROPHILS NFR BLD AUTO: 76 %
PLATELET # BLD AUTO: 341 X10E3/UL (ref 150–450)
POTASSIUM SERPL-SCNC: 4.5 MMOL/L (ref 3.5–5.2)
PROT SERPL-MCNC: 7.2 G/DL (ref 6–8.5)
RBC # BLD AUTO: 4.08 X10E6/UL (ref 3.77–5.28)
SODIUM SERPL-SCNC: 137 MMOL/L (ref 134–144)
TIBC SERPL-MCNC: 426 UG/DL (ref 250–450)
TRANSFERRIN SERPL-MCNC: 327 MG/DL (ref 200–370)
UIBC SERPL-MCNC: 357 UG/DL (ref 131–425)
WBC # BLD AUTO: 9.8 X10E3/UL (ref 3.4–10.8)

## 2019-10-24 ENCOUNTER — PATIENT MESSAGE (OUTPATIENT)
Dept: RHEUMATOLOGY | Facility: CLINIC | Age: 49
End: 2019-10-24

## 2019-10-24 ENCOUNTER — PATIENT OUTREACH (OUTPATIENT)
Dept: OTHER | Facility: OTHER | Age: 49
End: 2019-10-24

## 2019-10-24 DIAGNOSIS — I10 ESSENTIAL HYPERTENSION: Primary | ICD-10-CM

## 2019-10-24 DIAGNOSIS — M05.79 RHEUMATOID ARTHRITIS INVOLVING MULTIPLE SITES WITH POSITIVE RHEUMATOID FACTOR: Primary | ICD-10-CM

## 2019-10-24 DIAGNOSIS — D84.9 IMMUNOSUPPRESSION: ICD-10-CM

## 2019-10-24 RX ORDER — VALSARTAN 320 MG/1
320 TABLET ORAL DAILY
Qty: 90 TABLET | Refills: 1 | Status: SHIPPED | OUTPATIENT
Start: 2019-10-24 | End: 2020-04-08

## 2019-10-24 NOTE — PROGRESS NOTES
Digital Medicine: Clinician Follow-Up    The history is provided by the patient.     Follow Up  Follow-up reason(s): medication change      Medication Change: alternative therapy  Patient previously refused medication adjustment, now agrees to stepwise approach.   Denies s/sx of HTN - specifically denies HA, SOB, CP, and changes in vision. She endorses concerns for increasing BP.     Conversation was limited in time due to patient being at work (works in a school). Offered to call patient this afternoon/evening, she declined.           Sleep Apnea  Patient not previously diagnosed with SADNRA and     Medication Affordability  Patient is currently not having problems affording medications    Medication Adherence:   She misses doses: never        INTERVENTION(S)  recommended med change    PLAN  patient verbalizes understanding, patient amenable to changes, additional monitoring needed and Health  follow up    Requested patient submit more frequent BP readings to monitor effects of medication change.   Interchanged losartan to valsartan as first step in tighter BP control.   Will consider dyazide vs chlorthalidone with next outreach.     Continue to monitor and will schedule next call in 4-6 weeks to evaluate effectiveness of change.       There are no preventive care reminders to display for this patient.    Last 5 Patient Entered Readings                                      Current 30 Day Average: 144/88     Recent Readings 10/24/2019 10/8/2019 10/5/2019 9/25/2019 9/16/2019    SBP (mmHg) 138 150 141 145 142    DBP (mmHg) 85 89 90 86 84    Pulse 72 63 64 77 64             Hypertension Medications             amLODIPine (NORVASC) 5 MG tablet Take 1 tablet (5 mg total) by mouth once daily.    hydroCHLOROthiazide (HYDRODIURIL) 25 MG tablet TAKE 1 TABLET(25 MG) BY MOUTH EVERY DAY    valsartan (DIOVAN) 320 MG tablet Take 1 tablet (320 mg total) by mouth once daily.

## 2019-10-25 ENCOUNTER — PATIENT MESSAGE (OUTPATIENT)
Dept: RHEUMATOLOGY | Facility: CLINIC | Age: 49
End: 2019-10-25

## 2019-10-28 ENCOUNTER — PATIENT MESSAGE (OUTPATIENT)
Dept: RHEUMATOLOGY | Facility: CLINIC | Age: 49
End: 2019-10-28

## 2019-10-30 ENCOUNTER — PATIENT MESSAGE (OUTPATIENT)
Dept: RHEUMATOLOGY | Facility: CLINIC | Age: 49
End: 2019-10-30

## 2019-10-30 DIAGNOSIS — M05.79 RHEUMATOID ARTHRITIS INVOLVING MULTIPLE SITES WITH POSITIVE RHEUMATOID FACTOR: ICD-10-CM

## 2019-10-31 RX ORDER — METHOTREXATE 2.5 MG/1
25 TABLET ORAL
Qty: 50 TABLET | Refills: 2 | Status: SHIPPED | OUTPATIENT
Start: 2019-10-31 | End: 2019-11-06 | Stop reason: SDUPTHER

## 2019-10-31 RX ORDER — SULFASALAZINE 500 MG/1
TABLET, DELAYED RELEASE ORAL
Qty: 540 TABLET | Refills: 0 | Status: SHIPPED | OUTPATIENT
Start: 2019-10-31 | End: 2020-02-06 | Stop reason: SDUPTHER

## 2019-11-02 DIAGNOSIS — M05.79 RHEUMATOID ARTHRITIS INVOLVING MULTIPLE SITES WITH POSITIVE RHEUMATOID FACTOR: ICD-10-CM

## 2019-11-05 ENCOUNTER — PATIENT MESSAGE (OUTPATIENT)
Dept: RHEUMATOLOGY | Facility: CLINIC | Age: 49
End: 2019-11-05

## 2019-11-05 DIAGNOSIS — M05.79 RHEUMATOID ARTHRITIS INVOLVING MULTIPLE SITES WITH POSITIVE RHEUMATOID FACTOR: ICD-10-CM

## 2019-11-05 RX ORDER — IBUPROFEN 800 MG/1
TABLET ORAL
Qty: 90 TABLET | Refills: 0 | Status: SHIPPED | OUTPATIENT
Start: 2019-11-05 | End: 2019-12-27

## 2019-11-05 RX ORDER — FOLIC ACID 1 MG/1
TABLET ORAL
Qty: 30 TABLET | Refills: 2 | Status: SHIPPED | OUTPATIENT
Start: 2019-11-05 | End: 2019-11-15

## 2019-11-06 ENCOUNTER — PATIENT MESSAGE (OUTPATIENT)
Dept: RHEUMATOLOGY | Facility: CLINIC | Age: 49
End: 2019-11-06

## 2019-11-06 RX ORDER — METHOTREXATE 2.5 MG/1
25 TABLET ORAL
Qty: 40 TABLET | Refills: 2 | Status: SHIPPED | OUTPATIENT
Start: 2019-11-06 | End: 2020-02-05 | Stop reason: SDUPTHER

## 2019-11-06 NOTE — TELEPHONE ENCOUNTER
Pt requests 40 instead of 50 methotrexate pills. Informed pt I'd relay the message to Dr. Nicholson.

## 2019-11-08 ENCOUNTER — PATIENT MESSAGE (OUTPATIENT)
Dept: OTHER | Facility: OTHER | Age: 49
End: 2019-11-08

## 2019-11-13 NOTE — PROGRESS NOTES
Digital Medicine: Health  Follow-Up    Ms. Ruiz reports that she's doing well today.     The history is provided by the patient.     Follow Up  Follow-up reason(s): reading review      Readings are trending down due to medication adherence.      Patient contributes the main factor in her lower BP readings are from her new medication change.       INTERVENTION(S)  recommended diet modifications, recommend physical activity and encouragement/support    PLAN  patient verbalizes understanding      There are no preventive care reminders to display for this patient.    Last 5 Patient Entered Readings                                      Current 30 Day Average: 133/82     Recent Readings 11/10/2019 11/5/2019 11/1/2019 10/28/2019 10/24/2019    SBP (mmHg) 131 129 126 142 138    DBP (mmHg) 75 73 84 93 85    Pulse 70 67 67 75 72            Diet Screening       Patient is continuing to cut back on specific food items that have a lot of sodium in them like popcorn and olives.     Physical Activity Screening   When asked if exercising, patient responded: yes    Patient participates in the following activities: walking    Patient is continuing to stay active at work by walking and taking the stairs instead of the elevator.     Medication Adherence Screening   She misses doses: never    She does not wonder if medications are working.  She knows purpose of medications.

## 2019-11-15 DIAGNOSIS — M05.79 RHEUMATOID ARTHRITIS INVOLVING MULTIPLE SITES WITH POSITIVE RHEUMATOID FACTOR: ICD-10-CM

## 2019-11-15 RX ORDER — FOLIC ACID 1 MG/1
TABLET ORAL
Qty: 60 TABLET | Refills: 2 | Status: SHIPPED | OUTPATIENT
Start: 2019-11-15 | End: 2020-02-20

## 2019-11-21 ENCOUNTER — PATIENT OUTREACH (OUTPATIENT)
Dept: OTHER | Facility: OTHER | Age: 49
End: 2019-11-21

## 2019-11-21 NOTE — PROGRESS NOTES
Called patient to complete follow up after medication adjustment. Blood pressure was responding well, trending down towards goal, but most recent BP reading was elevated.     NA, unable to leave a message due to mailbox being full.     Will continue to monitor and attempt to reach patient again in the coming weeks if she does not call me back sooner.

## 2019-11-25 RX ORDER — HYDROXYCHLOROQUINE SULFATE 200 MG/1
TABLET, FILM COATED ORAL
Qty: 60 TABLET | Refills: 2 | Status: SHIPPED | OUTPATIENT
Start: 2019-11-25 | End: 2019-11-29 | Stop reason: SDUPTHER

## 2019-11-29 RX ORDER — HYDROXYCHLOROQUINE SULFATE 200 MG/1
200 TABLET, FILM COATED ORAL 2 TIMES DAILY
Qty: 60 TABLET | Refills: 2 | Status: SHIPPED | OUTPATIENT
Start: 2019-11-29 | End: 2020-03-10 | Stop reason: SDUPTHER

## 2019-12-05 NOTE — PROGRESS NOTES
Digital Medicine: Clinician Follow-Up    The history is provided by the patient.     Follow Up  Follow-up reason(s): reading review      Readings are trending down   Patient started new medication.    Is patient tolerating med change?:  YesPatient denies hypotensive signs and symptoms.   Patient also denies hypertensive signs and symptoms.     Tolerating valsartan well - no questions or concerns.     BP has responded well, current average reaches goal.       INTERVENTION(S)  reviewed appropriate dose schedule, reviewed monitoring technique and encouragement/support    PLAN  patient verbalizes understanding, Health  follow up and continue monitoring    Continue current regimen.   Next outreach scheduled in 4-6 months as patient is at goal. If upward trend is noted, will reach out sooner.       There are no preventive care reminders to display for this patient.    Last 5 Patient Entered Readings                                      Current 30 Day Average: 129/80     Recent Readings 12/5/2019 11/26/2019 11/25/2019 11/19/2019 11/15/2019    SBP (mmHg) 126 125 137 143 113    DBP (mmHg) 77 83 84 86 82    Pulse 67 68 73 73 71             Hypertension Medications             amLODIPine (NORVASC) 5 MG tablet Take 1 tablet (5 mg total) by mouth once daily.    hydroCHLOROthiazide (HYDRODIURIL) 25 MG tablet TAKE 1 TABLET(25 MG) BY MOUTH EVERY DAY    valsartan (DIOVAN) 320 MG tablet Take 1 tablet (320 mg total) by mouth once daily.                 Screenings

## 2019-12-23 ENCOUNTER — PATIENT OUTREACH (OUTPATIENT)
Dept: OTHER | Facility: OTHER | Age: 49
End: 2019-12-23

## 2019-12-27 DIAGNOSIS — M05.79 RHEUMATOID ARTHRITIS INVOLVING MULTIPLE SITES WITH POSITIVE RHEUMATOID FACTOR: ICD-10-CM

## 2019-12-27 RX ORDER — IBUPROFEN 800 MG/1
TABLET ORAL
Qty: 90 TABLET | Refills: 0 | Status: SHIPPED | OUTPATIENT
Start: 2019-12-27 | End: 2020-08-31 | Stop reason: SDUPTHER

## 2019-12-31 ENCOUNTER — PATIENT MESSAGE (OUTPATIENT)
Dept: RHEUMATOLOGY | Facility: CLINIC | Age: 49
End: 2019-12-31

## 2020-01-02 ENCOUNTER — PATIENT MESSAGE (OUTPATIENT)
Dept: RHEUMATOLOGY | Facility: CLINIC | Age: 50
End: 2020-01-02

## 2020-01-03 ENCOUNTER — PATIENT MESSAGE (OUTPATIENT)
Dept: RHEUMATOLOGY | Facility: CLINIC | Age: 50
End: 2020-01-03

## 2020-01-06 NOTE — PROGRESS NOTES
Digital Medicine: Health  Follow-Up    Ms. Ruiz reports that she's doing well. She's happy with how her readings are looking.     The history is provided by the patient.     Follow Up  Follow-up reason(s): reading review and routine education      Routine Education Topics: physical activity        INTERVENTION(S)  recommend physical activity and encouragement/support    PLAN  patient verbalizes understanding      There are no preventive care reminders to display for this patient.    Last 5 Patient Entered Readings                                      Current 30 Day Average: 132/82     Recent Readings 12/31/2019 12/19/2019 12/16/2019 12/9/2019 12/5/2019    SBP (mmHg) 128 125 139 137 126    DBP (mmHg) 79 79 85 83 77    Pulse 69 66 65 65 67            Physical Activity Screening   When asked if exercising, patient responded: yes    Patient participates in the following activities: walking and treadmill    Patient reports that her goal for this month is to increase her exercise. She plans to utilize her treadmill at home more frequently. She will also be walking with a group from work after work. 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 Encouraged a consistent routine, and will continue to check in on that.     Medication Adherence Screening   She misses doses: never    She does not wonder if medications are working.  She knows purpose of medications.      Patient is hopeful to work on her lifestyle modifications, lose weight, and cut back on medications.

## 2020-01-20 RX ORDER — AMLODIPINE BESYLATE 5 MG/1
TABLET ORAL
Qty: 90 TABLET | Refills: 2 | Status: SHIPPED | OUTPATIENT
Start: 2020-01-20 | End: 2020-10-28 | Stop reason: SDUPTHER

## 2020-01-26 ENCOUNTER — PATIENT OUTREACH (OUTPATIENT)
Dept: ADMINISTRATIVE | Facility: OTHER | Age: 50
End: 2020-01-26

## 2020-01-28 ENCOUNTER — OFFICE VISIT (OUTPATIENT)
Dept: RHEUMATOLOGY | Facility: CLINIC | Age: 50
End: 2020-01-28
Payer: COMMERCIAL

## 2020-01-28 VITALS
BODY MASS INDEX: 43.36 KG/M2 | WEIGHT: 244.69 LBS | SYSTOLIC BLOOD PRESSURE: 129 MMHG | DIASTOLIC BLOOD PRESSURE: 84 MMHG | HEART RATE: 70 BPM | HEIGHT: 63 IN

## 2020-01-28 DIAGNOSIS — D84.9 IMMUNOSUPPRESSION: ICD-10-CM

## 2020-01-28 DIAGNOSIS — M05.79 RHEUMATOID ARTHRITIS INVOLVING MULTIPLE SITES WITH POSITIVE RHEUMATOID FACTOR: Primary | ICD-10-CM

## 2020-01-28 DIAGNOSIS — E66.01 CLASS 3 SEVERE OBESITY WITH SERIOUS COMORBIDITY AND BODY MASS INDEX (BMI) OF 40.0 TO 44.9 IN ADULT, UNSPECIFIED OBESITY TYPE: ICD-10-CM

## 2020-01-28 DIAGNOSIS — E55.9 VITAMIN D DEFICIENCY DISEASE: ICD-10-CM

## 2020-01-28 DIAGNOSIS — R53.83 FATIGUE, UNSPECIFIED TYPE: ICD-10-CM

## 2020-01-28 PROCEDURE — 99999 PR PBB SHADOW E&M-EST. PATIENT-LVL III: CPT | Mod: PBBFAC,,, | Performed by: INTERNAL MEDICINE

## 2020-01-28 PROCEDURE — 3074F PR MOST RECENT SYSTOLIC BLOOD PRESSURE < 130 MM HG: ICD-10-PCS | Mod: CPTII,S$GLB,, | Performed by: INTERNAL MEDICINE

## 2020-01-28 PROCEDURE — 3074F SYST BP LT 130 MM HG: CPT | Mod: CPTII,S$GLB,, | Performed by: INTERNAL MEDICINE

## 2020-01-28 PROCEDURE — 3008F BODY MASS INDEX DOCD: CPT | Mod: CPTII,S$GLB,, | Performed by: INTERNAL MEDICINE

## 2020-01-28 PROCEDURE — 99999 PR PBB SHADOW E&M-EST. PATIENT-LVL III: ICD-10-PCS | Mod: PBBFAC,,, | Performed by: INTERNAL MEDICINE

## 2020-01-28 PROCEDURE — 99214 PR OFFICE/OUTPT VISIT, EST, LEVL IV, 30-39 MIN: ICD-10-PCS | Mod: S$GLB,,, | Performed by: INTERNAL MEDICINE

## 2020-01-28 PROCEDURE — 3079F DIAST BP 80-89 MM HG: CPT | Mod: CPTII,S$GLB,, | Performed by: INTERNAL MEDICINE

## 2020-01-28 PROCEDURE — 3079F PR MOST RECENT DIASTOLIC BLOOD PRESSURE 80-89 MM HG: ICD-10-PCS | Mod: CPTII,S$GLB,, | Performed by: INTERNAL MEDICINE

## 2020-01-28 PROCEDURE — 99214 OFFICE O/P EST MOD 30 MIN: CPT | Mod: S$GLB,,, | Performed by: INTERNAL MEDICINE

## 2020-01-28 PROCEDURE — 3008F PR BODY MASS INDEX (BMI) DOCUMENTED: ICD-10-PCS | Mod: CPTII,S$GLB,, | Performed by: INTERNAL MEDICINE

## 2020-01-28 ASSESSMENT — ROUTINE ASSESSMENT OF PATIENT INDEX DATA (RAPID3)
PAIN SCORE: 0
FATIGUE SCORE: 2
PSYCHOLOGICAL DISTRESS SCORE: 0
MDHAQ FUNCTION SCORE: .2
AM STIFFNESS SCORE: 1, YES
WHEN YOU AWAKENED IN THE MORNING OVER THE LAST WEEK, PLEASE INDICATE THE AMOUNT OF TIME IT TAKES UNTIL YOU ARE AS LIMBER AS YOU WILL BE FOR THE DAY: 1 MINUTE
PATIENT GLOBAL ASSESSMENT SCORE: 0
TOTAL RAPID3 SCORE: .22

## 2020-01-28 NOTE — PROGRESS NOTES
"Subjective:       Patient ID: Antonio Ruiz is a 49 y.o. female.    Chief Complaint: Rheumatoid arthritis    HPI:  Antonio Ruiz is a 49 y.o. female with a history   of rheumatoid arthritis manifested by symmetrical small joint involvement,   history of elbow contracture on the right in the past, positive rheumatoid   factor, positive WILMA. Humira stopped due to rash and pain.  Orencia did not help.    The patient currently takes Methotrexate 20 mg weekly, folic acid daily, sulfasalazine 1500 mg twice a day and Plaquenil 200 mg bid.    Patient off prednisone.       Denies pain.   Morning stiffness of 1 minutes          Review of Systems   Constitutional: Positive for fatigue. Negative for fever and unexpected weight change (prednisone causing weight gain).   HENT: Negative.  Negative for trouble swallowing.    Eyes: Negative.  Negative for redness.   Respiratory: Negative for cough and shortness of breath.    Cardiovascular: Negative.  Negative for chest pain.   Gastrointestinal: Negative.  Negative for constipation and diarrhea.   Endocrine: Negative.    Genitourinary: Negative.  Negative for dysuria and genital sores.   Musculoskeletal: Positive for arthralgias.   Skin: Negative for rash.   Allergic/Immunologic: Negative.    Neurological: Negative.  Negative for headaches.   Hematological: Negative.  Does not bruise/bleed easily.   Psychiatric/Behavioral: Negative.          Objective:   /84 (BP Location: Left arm, Patient Position: Sitting, BP Method: Large (Automatic))   Pulse 70   Ht 5' 3" (1.6 m)   Wt 111 kg (244 lb 11.4 oz)   BMI 43.35 kg/m²      Physical Exam   Constitutional: She is oriented to person, place, and time and well-developed, well-nourished, and in no distress.   HENT:   Head: Normocephalic and atraumatic.   Eyes: Conjunctivae and EOM are normal.   Neck: Neck supple.   Cardiovascular: Normal rate, regular rhythm and normal heart sounds.    Pulmonary/Chest: Effort normal and " breath sounds normal.   Abdominal: Soft. Bowel sounds are normal.   Neurological: She is alert and oriented to person, place, and time. No cranial nerve deficit. Gait normal.   Skin: Skin is warm and dry.     Psychiatric: Mood and affect normal.   Musculoskeletal: She exhibits no edema, tenderness or deformity.   28 joint count: 0 swollen and 0 tender  No MTP tenderness bilaterally  No pain on top of feet       LABS    Component      Latest Ref Rng & Units 10/21/2019   WBC      3.4 - 10.8 x10E3/uL 9.8   RBC      3.77 - 5.28 x10E6/uL 4.08   Hemoglobin      11.1 - 15.9 g/dL 10.1 (L)   Hematocrit      34.0 - 46.6 % 34.0   MCV      79 - 97 fL 83   MCH      26.6 - 33.0 pg 24.8 (L)   MCHC      31.5 - 35.7 g/dL 29.7 (L)   RDW      12.3 - 15.4 % 21.1 (H)   Platelets      150 - 450 x10E3/uL 341   Neutrophils      Not Estab. % 76   Lymph%      Not Estab. % 11   Mono%      Not Estab. % 11   Eosinophil%      Not Estab. % 2   Basophil%      Not Estab. % 0   Neutrophils Absolute      1.4 - 7.0 x10E3/uL 7.5 (H)   Lymph #      0.7 - 3.1 x10E3/uL 1.1   Mono #      0.1 - 0.9 x10E3/uL 1.0 (H)   Eos #      0.0 - 0.4 x10E3/uL 0.2   Baso #      0.0 - 0.2 x10E3/uL 0.0   Immature Granulocytes      Not Estab. % 0   Immature Grans (Abs)      0.0 - 0.1 x10E3/uL 0.0   Glucose      65 - 99 mg/dL 33 (<)   BUN, Bld      6 - 24 mg/dL 10   Creatinine      0.57 - 1.00 mg/dL 0.63   eGFR if non African American      >59 mL/min/1.73 104   eGFR if African American      >59 mL/min/1.73 120   BUN/Creatinine Ratio      9 - 23 16   Sodium      134 - 144 mmol/L 137   Potassium      3.5 - 5.2 mmol/L 4.5   Chloride      96 - 106 mmol/L 99   CO2      20 - 29 mmol/L 18 (L)   Calcium      8.7 - 10.2 mg/dL 9.3   PROTEIN TOTAL      6.0 - 8.5 g/dL 7.2   Albumin      3.5 - 5.5 g/dL 4.2   Globulin, Total      1.5 - 4.5 g/dL 3.0   Albumin/Globulin Ratio      1.2 - 2.2 1.4   BILIRUBIN TOTAL      0.0 - 1.2 mg/dL 0.2   Alkaline Phosphatase      39 - 117 IU/L 62   AST       0 - 40 IU/L 14   ALT      0 - 32 IU/L 8   TIBC      250 - 450 ug/dL 426   UIBC      131 - 425 ug/dL 357   Iron      27 - 159 ug/dL 69   Iron Saturation      15 - 55 % 16   CRP      0 - 10 mg/L 4   Vit D, 25-Hydroxy      30.0 - 100.0 ng/mL 12.6 (L)   Ferritin      15 - 150 ng/mL 11 (L)   Transferrin      200 - 370 mg/dL 327   BNP      0.0 - 100.0 pg/mL 64.4   Sed Rate      0 - 40 mm/hr 27        Assessment:       1. RA. Failed Humira and Orencia.  She is on Plaquenil, methotrexate and SSZ.  Now feeling well with stiffness.  2. 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 Rash and Positive WILMA/SSA/ Matthew.  Previously +DS DNA and normal complements.  3. Alopecia.  Schedule follow up with Dr. Martines  4. Leg edema bilateral after starting Norvasc  5. Diarrhea with medications.  with noncoated SSZ  6. Obesity  7. Osteoporosis prophylaxis.  8. HTN.  Mildly elevated BP.  Patient to follow to primary doctor  9. Right arm lesion.  Similar lesion treated with a foam by dermatology  10.  Vitamin D deficiency  11.  Back pain  12.  Anemia   13.  Cough      Plan:       1.  Labs  2.  Continue MTX 20 mg qweek, SSZ and plaquenil.  Continue to hold prednisone.  If needed in future MTX to 25 mg to see if stiffness improves.     3.  Consider Xeljanz.  Hold on Rituxan for now.    4.  Follow up with Dr. Martines in dermatology  5.  Calcium 1,000 mg daily and vitamin D3 5,000 units daily.    6. Plaquenil exam normal in December 2019    RTO 3 months/prn

## 2020-01-29 ENCOUNTER — IMMUNIZATION (OUTPATIENT)
Dept: PHARMACY | Facility: CLINIC | Age: 50
End: 2020-01-29
Payer: COMMERCIAL

## 2020-02-05 DIAGNOSIS — M05.79 RHEUMATOID ARTHRITIS INVOLVING MULTIPLE SITES WITH POSITIVE RHEUMATOID FACTOR: ICD-10-CM

## 2020-02-05 RX ORDER — METHOTREXATE 2.5 MG/1
25 TABLET ORAL
Qty: 40 TABLET | Refills: 0 | Status: SHIPPED | OUTPATIENT
Start: 2020-02-05 | End: 2020-03-10 | Stop reason: SDUPTHER

## 2020-02-06 RX ORDER — SULFASALAZINE 500 MG/1
TABLET, DELAYED RELEASE ORAL
Qty: 540 TABLET | Refills: 0 | Status: SHIPPED | OUTPATIENT
Start: 2020-02-06 | End: 2020-05-12

## 2020-02-09 LAB
25(OH)D3+25(OH)D2 SERPL-MCNC: 18 NG/ML (ref 30–100)
ALBUMIN SERPL-MCNC: 4.4 G/DL (ref 3.8–4.9)
ALBUMIN/GLOB SERPL: 1.4 {RATIO} (ref 1.2–2.2)
ALP SERPL-CCNC: 55 IU/L (ref 39–117)
ALT SERPL-CCNC: 10 IU/L (ref 0–32)
APPEARANCE UR: CLEAR
AST SERPL-CCNC: 14 IU/L (ref 0–40)
BASOPHILS # BLD AUTO: 0 X10E3/UL (ref 0–0.2)
BASOPHILS NFR BLD AUTO: 0 %
BILIRUB SERPL-MCNC: <0.2 MG/DL (ref 0–1.2)
BILIRUB UR QL STRIP: NEGATIVE
BUN SERPL-MCNC: 11 MG/DL (ref 6–24)
BUN/CREAT SERPL: 15 (ref 9–23)
C3 SERPL-MCNC: 119 MG/DL (ref 82–167)
C4 SERPL-MCNC: 41 MG/DL (ref 14–44)
CALCIUM SERPL-MCNC: 9.6 MG/DL (ref 8.7–10.2)
CHLORIDE SERPL-SCNC: 99 MMOL/L (ref 96–106)
CO2 SERPL-SCNC: 19 MMOL/L (ref 20–29)
COLOR UR: YELLOW
CREAT SERPL-MCNC: 0.72 MG/DL (ref 0.57–1)
CREAT UR-MCNC: 47.5 MG/DL
CRP SERPL-MCNC: 1 MG/L (ref 0–10)
DSDNA AB SER-ACNC: <1 IU/ML (ref 0–9)
EOSINOPHIL # BLD AUTO: 0.1 X10E3/UL (ref 0–0.4)
EOSINOPHIL NFR BLD AUTO: 3 %
ERYTHROCYTE [DISTWIDTH] IN BLOOD BY AUTOMATED COUNT: 19.2 % (ref 11.7–15.4)
ERYTHROCYTE [SEDIMENTATION RATE] IN BLOOD BY WESTERGREN METHOD: 30 MM/HR (ref 0–40)
GLOBULIN SER CALC-MCNC: 3.2 G/DL (ref 1.5–4.5)
GLUCOSE SERPL-MCNC: 89 MG/DL (ref 65–99)
GLUCOSE UR QL: NEGATIVE
HCT VFR BLD AUTO: 35.8 % (ref 34–46.6)
HGB BLD-MCNC: 11.6 G/DL (ref 11.1–15.9)
HGB UR QL STRIP: NEGATIVE
IMM GRANULOCYTES # BLD AUTO: 0 X10E3/UL (ref 0–0.1)
IMM GRANULOCYTES NFR BLD AUTO: 0 %
KETONES UR QL STRIP: NEGATIVE
LEUKOCYTE ESTERASE UR QL STRIP: NEGATIVE
LYMPHOCYTES # BLD AUTO: 0.9 X10E3/UL (ref 0.7–3.1)
LYMPHOCYTES NFR BLD AUTO: 21 %
MCH RBC QN AUTO: 27.1 PG (ref 26.6–33)
MCHC RBC AUTO-ENTMCNC: 32.4 G/DL (ref 31.5–35.7)
MCV RBC AUTO: 84 FL (ref 79–97)
MICRO URNS: NORMAL
MONOCYTES # BLD AUTO: 0.4 X10E3/UL (ref 0.1–0.9)
MONOCYTES NFR BLD AUTO: 11 %
NEUTROPHILS # BLD AUTO: 2.7 X10E3/UL (ref 1.4–7)
NEUTROPHILS NFR BLD AUTO: 65 %
NITRITE UR QL STRIP: NEGATIVE
PH UR STRIP: 6.5 [PH] (ref 5–7.5)
PLATELET # BLD AUTO: 308 X10E3/UL (ref 150–450)
POTASSIUM SERPL-SCNC: 4.2 MMOL/L (ref 3.5–5.2)
PROT SERPL-MCNC: 7.6 G/DL (ref 6–8.5)
PROT UR QL STRIP: NEGATIVE
PROT UR-MCNC: 7.7 MG/DL
PROT/CREAT UR: 162 MG/G CREAT (ref 0–200)
RBC # BLD AUTO: 4.28 X10E6/UL (ref 3.77–5.28)
SODIUM SERPL-SCNC: 136 MMOL/L (ref 134–144)
SP GR UR: 1.01 (ref 1–1.03)
UROBILINOGEN UR STRIP-MCNC: 0.2 MG/DL (ref 0.2–1)
WBC # BLD AUTO: 4.2 X10E3/UL (ref 3.4–10.8)

## 2020-02-10 ENCOUNTER — PATIENT MESSAGE (OUTPATIENT)
Dept: RHEUMATOLOGY | Facility: CLINIC | Age: 50
End: 2020-02-10

## 2020-02-20 DIAGNOSIS — M05.79 RHEUMATOID ARTHRITIS INVOLVING MULTIPLE SITES WITH POSITIVE RHEUMATOID FACTOR: ICD-10-CM

## 2020-02-20 RX ORDER — FOLIC ACID 1 MG/1
TABLET ORAL
Qty: 60 TABLET | Refills: 2 | Status: SHIPPED | OUTPATIENT
Start: 2020-02-20 | End: 2020-07-01 | Stop reason: SDUPTHER

## 2020-02-24 ENCOUNTER — PATIENT OUTREACH (OUTPATIENT)
Dept: ADMINISTRATIVE | Facility: HOSPITAL | Age: 50
End: 2020-02-24

## 2020-02-24 DIAGNOSIS — Z12.4 PAP SMEAR FOR CERVICAL CANCER SCREENING: Primary | ICD-10-CM

## 2020-02-24 DIAGNOSIS — Z12.11 SCREENING FOR COLON CANCER: ICD-10-CM

## 2020-02-27 ENCOUNTER — HOSPITAL ENCOUNTER (OUTPATIENT)
Dept: RADIOLOGY | Facility: OTHER | Age: 50
Discharge: HOME | End: 2020-02-27
Attending: INTERNAL MEDICINE
Payer: COMMERCIAL

## 2020-02-27 DIAGNOSIS — Z12.39 BREAST CANCER SCREENING: ICD-10-CM

## 2020-02-27 DIAGNOSIS — Z12.31 VISIT FOR SCREENING MAMMOGRAM: ICD-10-CM

## 2020-02-27 PROCEDURE — 77067 MAMMO DIGITAL SCREENING BILAT WITH TOMOSYNTHESIS_CAD: ICD-10-PCS | Mod: 26,,, | Performed by: RADIOLOGY

## 2020-02-27 PROCEDURE — 77067 SCR MAMMO BI INCL CAD: CPT | Mod: 26,,, | Performed by: RADIOLOGY

## 2020-02-27 PROCEDURE — 77067 SCR MAMMO BI INCL CAD: CPT | Mod: TC

## 2020-02-27 PROCEDURE — 77063 BREAST TOMOSYNTHESIS BI: CPT | Mod: 26,,, | Performed by: RADIOLOGY

## 2020-02-27 PROCEDURE — 77063 MAMMO DIGITAL SCREENING BILAT WITH TOMOSYNTHESIS_CAD: ICD-10-PCS | Mod: 26,,, | Performed by: RADIOLOGY

## 2020-03-02 ENCOUNTER — PATIENT MESSAGE (OUTPATIENT)
Dept: SURGERY | Facility: CLINIC | Age: 50
End: 2020-03-02

## 2020-03-02 ENCOUNTER — TELEPHONE (OUTPATIENT)
Dept: SURGERY | Facility: CLINIC | Age: 50
End: 2020-03-02

## 2020-03-02 NOTE — TELEPHONE ENCOUNTER
Returned call to Mrs Ruiz regarding a referral placed in the system by Dr.Nicholas Mari PCP for High Risk Breast Cancer due to Family Hx. Please call Karina crespo [email or web address] [phone] as I would be happy to schedule this apt.

## 2020-03-03 ENCOUNTER — PATIENT OUTREACH (OUTPATIENT)
Dept: OTHER | Facility: OTHER | Age: 50
End: 2020-03-03

## 2020-03-03 NOTE — PROGRESS NOTES
Digital Medicine: Health  Follow-Up    Ms. Ruiz reports that she's doing well. She was at work and didn't have much time to talk today.    The history is provided by the patient.     Follow Up  Follow-up reason(s): reading review and routine education      Routine Education Topics: physical activity        INTERVENTION(S)  encouragement/support    PLAN  patient verbalizes understanding      There are no preventive care reminders to display for this patient.    Last 5 Patient Entered Readings                                      Current 30 Day Average: 131/81     Recent Readings 2/26/2020 2/5/2020 1/23/2020 1/13/2020 1/11/2020    SBP (mmHg) 127 135 125 136 136    DBP (mmHg) 76 86 80 81 82    Pulse 60 62 65 63 63            Physical Activity Screening   When asked if exercising, patient responded: no    She identified the following barriers to physical activity: lack of time and motivation    Patient reports that she hasn't had a chance to start the exercise program that she would have liked. 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 She thinks that she's be more willing to exercise once that is complete. She did not want to set any goals today, but it is in her mind to do more exercise.     Medication Adherence Screening       Patient reports that the change in medication has really helped her BP.

## 2020-03-10 ENCOUNTER — TELEPHONE (OUTPATIENT)
Dept: RHEUMATOLOGY | Facility: CLINIC | Age: 50
End: 2020-03-10

## 2020-03-10 DIAGNOSIS — M05.79 RHEUMATOID ARTHRITIS INVOLVING MULTIPLE SITES WITH POSITIVE RHEUMATOID FACTOR: ICD-10-CM

## 2020-03-10 RX ORDER — METHOTREXATE 2.5 MG/1
20 TABLET ORAL
Qty: 32 TABLET | Refills: 2 | Status: SHIPPED | OUTPATIENT
Start: 2020-03-10 | End: 2020-06-04 | Stop reason: SDUPTHER

## 2020-03-10 RX ORDER — METHOTREXATE 2.5 MG/1
25 TABLET ORAL
Qty: 40 TABLET | Refills: 0 | Status: SHIPPED | OUTPATIENT
Start: 2020-03-10 | End: 2020-03-10 | Stop reason: SDUPTHER

## 2020-03-10 RX ORDER — HYDROXYCHLOROQUINE SULFATE 200 MG/1
200 TABLET, FILM COATED ORAL 2 TIMES DAILY
Qty: 60 TABLET | Refills: 2 | Status: SHIPPED | OUTPATIENT
Start: 2020-03-10 | End: 2020-07-17

## 2020-04-01 ENCOUNTER — PATIENT OUTREACH (OUTPATIENT)
Dept: OTHER | Facility: OTHER | Age: 50
End: 2020-04-01

## 2020-04-01 NOTE — PROGRESS NOTES
"Digital Medicine: Health  Follow-Up    Ms. Ruiz reports that she's doing as good as could be expected at this time.     The history is provided by the patient.     Follow Up  Follow-up reason(s): reading review and routine education      Readings are trending up due to stress.    Routine Education Topics: physical activity      Patient reports that she hasn't really been working on anything to help lower her BP. She's been too busy trying to figure out her new routine with the pandemic. 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 She's working from home the rest of the days. Asked if she needed help to come up with a "new normal" routine. She denies at this time.     Asked patient about elevated reading on 3/30/20. She said she's not sure what happened. She thought it could have been stress related. Encoruaged her to retest if she gets an elevated reading. She also admitted that she does need to submit more BP readings. Encouraged her to do so.       INTERVENTION(S)  encouragement/support    PLAN  patient verbalizes understanding      There are no preventive care reminders to display for this patient.    Last 5 Patient Entered Readings                                      Current 30 Day Average: 139/87     Recent Readings 3/30/2020 3/17/2020 3/9/2020 2/26/2020 2/5/2020    SBP (mmHg) 151 133 133 127 135    DBP (mmHg) 88 86 87 76 86    Pulse 66 59 69 60 62            Physical Activity Screening   When asked if exercising, patient responded: no    She identified the following barriers to physical activity: lack of time and motivation    Patient reports that she might try to plan a walking routine with her daughter. Will follow up on this next encounter.     "

## 2020-04-02 ENCOUNTER — PATIENT MESSAGE (OUTPATIENT)
Dept: RHEUMATOLOGY | Facility: CLINIC | Age: 50
End: 2020-04-02

## 2020-04-02 DIAGNOSIS — M05.79 RHEUMATOID ARTHRITIS INVOLVING MULTIPLE SITES WITH POSITIVE RHEUMATOID FACTOR: ICD-10-CM

## 2020-04-03 RX ORDER — PREDNISONE 10 MG/1
10 TABLET ORAL DAILY
Qty: 30 TABLET | Refills: 0 | Status: SHIPPED | OUTPATIENT
Start: 2020-04-03 | End: 2020-10-30 | Stop reason: SDUPTHER

## 2020-04-06 ENCOUNTER — PATIENT OUTREACH (OUTPATIENT)
Dept: OTHER | Facility: OTHER | Age: 50
End: 2020-04-06

## 2020-04-06 NOTE — PROGRESS NOTES
Digital Medicine: Clinician Follow-Up    Called patient for follow up.     The history is provided by the patient.     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   Denies hypertensive symptoms.   States planned to take her BP today, but has not made time for it yet. She states she will submit a reading before the day is over once she has a moment to rest and relax.     Medication compliance confirmed. She believes her BP elevation is due to stress/anxiety surrounding the COVID pandemic.       INTERVENTION(S)  reviewed appropriate dose schedule and encouragement/support    PLAN  patient verbalizes understanding, Health  follow up and continue monitoring    Reviewed goals - recommended patient increase physical activity where she can. Will monitor for the upcoming months to allow pandemic to minimize before medication changes are made, per patient request.       There are no preventive care reminders to display for this patient.    Last 5 Patient Entered Readings                                      Current 30 Day Average: 139/87     Recent Readings 3/30/2020 3/17/2020 3/9/2020 2/26/2020 2/5/2020    SBP (mmHg) 151 133 133 127 135    DBP (mmHg) 88 86 87 76 86    Pulse 66 59 69 60 62             Hypertension Medications             amLODIPine (NORVASC) 5 MG tablet TAKE 1 TABLET(5 MG) BY MOUTH EVERY DAY    hydroCHLOROthiazide (HYDRODIURIL) 25 MG tablet TAKE 1 TABLET(25 MG) BY MOUTH EVERY DAY    valsartan (DIOVAN) 320 MG tablet Take 1 tablet (320 mg total) by mouth once daily.

## 2020-04-07 DIAGNOSIS — I10 ESSENTIAL HYPERTENSION: ICD-10-CM

## 2020-04-08 RX ORDER — VALSARTAN 320 MG/1
TABLET ORAL
Qty: 90 TABLET | Refills: 0 | Status: SHIPPED | OUTPATIENT
Start: 2020-04-08 | End: 2020-05-25

## 2020-04-14 ENCOUNTER — PATIENT OUTREACH (OUTPATIENT)
Dept: ADMINISTRATIVE | Facility: HOSPITAL | Age: 50
End: 2020-04-14

## 2020-05-09 DIAGNOSIS — D84.9 IMMUNOSUPPRESSION: ICD-10-CM

## 2020-05-09 DIAGNOSIS — E55.9 VITAMIN D DEFICIENCY DISEASE: ICD-10-CM

## 2020-05-09 DIAGNOSIS — M05.79 RHEUMATOID ARTHRITIS INVOLVING MULTIPLE SITES WITH POSITIVE RHEUMATOID FACTOR: Primary | ICD-10-CM

## 2020-05-12 ENCOUNTER — TELEPHONE (OUTPATIENT)
Dept: INTERNAL MEDICINE | Facility: CLINIC | Age: 50
End: 2020-05-12

## 2020-05-12 DIAGNOSIS — I10 ESSENTIAL HYPERTENSION: Primary | ICD-10-CM

## 2020-05-12 RX ORDER — IRBESARTAN 300 MG/1
300 TABLET ORAL DAILY
Qty: 90 TABLET | Refills: 0 | Status: SHIPPED | OUTPATIENT
Start: 2020-05-12 | End: 2020-05-25 | Stop reason: SDUPTHER

## 2020-05-12 RX ORDER — SULFASALAZINE 500 MG/1
TABLET, DELAYED RELEASE ORAL
Qty: 540 TABLET | Refills: 0 | Status: SHIPPED | OUTPATIENT
Start: 2020-05-12 | End: 2020-05-25

## 2020-05-12 NOTE — TELEPHONE ENCOUNTER
I am covering for Dr. Mari.  Please inform patient that I sent irbesartan to her pharmacy instead of valsartan due to valsartan being on back order.

## 2020-05-12 NOTE — TELEPHONE ENCOUNTER
Pharmacy called to let us know patients Valsartan is on Backorder, they are requesting an alternative be sent in. Please advise.

## 2020-05-13 ENCOUNTER — PATIENT OUTREACH (OUTPATIENT)
Dept: OTHER | Facility: OTHER | Age: 50
End: 2020-05-13

## 2020-05-14 ENCOUNTER — TELEPHONE (OUTPATIENT)
Dept: INTERNAL MEDICINE | Facility: CLINIC | Age: 50
End: 2020-05-14

## 2020-05-20 ENCOUNTER — PATIENT OUTREACH (OUTPATIENT)
Dept: OTHER | Facility: OTHER | Age: 50
End: 2020-05-20

## 2020-05-20 DIAGNOSIS — I10 ESSENTIAL HYPERTENSION: Primary | ICD-10-CM

## 2020-05-21 ENCOUNTER — TELEPHONE (OUTPATIENT)
Dept: INTERNAL MEDICINE | Facility: CLINIC | Age: 50
End: 2020-05-21

## 2020-05-21 DIAGNOSIS — I10 ESSENTIAL HYPERTENSION: ICD-10-CM

## 2020-05-22 ENCOUNTER — PATIENT MESSAGE (OUTPATIENT)
Dept: RHEUMATOLOGY | Facility: CLINIC | Age: 50
End: 2020-05-22

## 2020-05-25 DIAGNOSIS — M05.79 RHEUMATOID ARTHRITIS INVOLVING MULTIPLE SITES WITH POSITIVE RHEUMATOID FACTOR: ICD-10-CM

## 2020-05-25 RX ORDER — IRBESARTAN 300 MG/1
300 TABLET ORAL DAILY
Qty: 90 TABLET | Refills: 0 | Status: SHIPPED | OUTPATIENT
Start: 2020-05-25 | End: 2020-11-23 | Stop reason: SDUPTHER

## 2020-05-25 RX ORDER — SULFASALAZINE 500 MG/1
TABLET, DELAYED RELEASE ORAL
Qty: 540 TABLET | Refills: 0 | Status: SHIPPED | OUTPATIENT
Start: 2020-05-25 | End: 2020-06-22

## 2020-05-27 LAB
25(OH)D3+25(OH)D2 SERPL-MCNC: 16.6 NG/ML (ref 30–100)
ALBUMIN SERPL-MCNC: 4.3 G/DL (ref 3.8–4.9)
ALBUMIN/GLOB SERPL: 1.5 {RATIO} (ref 1.2–2.2)
ALP SERPL-CCNC: 57 IU/L (ref 39–117)
ALT SERPL-CCNC: 10 IU/L (ref 0–32)
AST SERPL-CCNC: 14 IU/L (ref 0–40)
BASOPHILS # BLD AUTO: 0 X10E3/UL (ref 0–0.2)
BASOPHILS NFR BLD AUTO: 1 %
BILIRUB SERPL-MCNC: 0.3 MG/DL (ref 0–1.2)
BUN SERPL-MCNC: 17 MG/DL (ref 6–24)
BUN/CREAT SERPL: 23 (ref 9–23)
CALCIUM SERPL-MCNC: 9.5 MG/DL (ref 8.7–10.2)
CHLORIDE SERPL-SCNC: 102 MMOL/L (ref 96–106)
CO2 SERPL-SCNC: 24 MMOL/L (ref 20–29)
CREAT SERPL-MCNC: 0.73 MG/DL (ref 0.57–1)
CRP SERPL-MCNC: 1 MG/L (ref 0–10)
EOSINOPHIL # BLD AUTO: 0.1 X10E3/UL (ref 0–0.4)
EOSINOPHIL NFR BLD AUTO: 3 %
ERYTHROCYTE [DISTWIDTH] IN BLOOD BY AUTOMATED COUNT: 15.9 % (ref 11.7–15.4)
ERYTHROCYTE [SEDIMENTATION RATE] IN BLOOD BY WESTERGREN METHOD: 17 MM/HR (ref 0–40)
GLOBULIN SER CALC-MCNC: 2.9 G/DL (ref 1.5–4.5)
GLUCOSE SERPL-MCNC: 92 MG/DL (ref 65–99)
HCT VFR BLD AUTO: 34.8 % (ref 34–46.6)
HGB BLD-MCNC: 11.4 G/DL (ref 11.1–15.9)
IMM GRANULOCYTES # BLD AUTO: 0 X10E3/UL (ref 0–0.1)
IMM GRANULOCYTES NFR BLD AUTO: 0 %
LYMPHOCYTES # BLD AUTO: 0.7 X10E3/UL (ref 0.7–3.1)
LYMPHOCYTES NFR BLD AUTO: 16 %
MCH RBC QN AUTO: 28.9 PG (ref 26.6–33)
MCHC RBC AUTO-ENTMCNC: 32.8 G/DL (ref 31.5–35.7)
MCV RBC AUTO: 88 FL (ref 79–97)
MONOCYTES # BLD AUTO: 0.5 X10E3/UL (ref 0.1–0.9)
MONOCYTES NFR BLD AUTO: 11 %
NEUTROPHILS # BLD AUTO: 3 X10E3/UL (ref 1.4–7)
NEUTROPHILS NFR BLD AUTO: 69 %
PLATELET # BLD AUTO: 291 X10E3/UL (ref 150–450)
POTASSIUM SERPL-SCNC: 4.4 MMOL/L (ref 3.5–5.2)
PROT SERPL-MCNC: 7.2 G/DL (ref 6–8.5)
RBC # BLD AUTO: 3.94 X10E6/UL (ref 3.77–5.28)
SODIUM SERPL-SCNC: 138 MMOL/L (ref 134–144)
WBC # BLD AUTO: 4.3 X10E3/UL (ref 3.4–10.8)

## 2020-06-03 ENCOUNTER — PATIENT MESSAGE (OUTPATIENT)
Dept: RHEUMATOLOGY | Facility: CLINIC | Age: 50
End: 2020-06-03

## 2020-06-03 DIAGNOSIS — M05.79 RHEUMATOID ARTHRITIS INVOLVING MULTIPLE SITES WITH POSITIVE RHEUMATOID FACTOR: ICD-10-CM

## 2020-06-04 RX ORDER — METHOTREXATE 2.5 MG/1
20 TABLET ORAL
Qty: 32 TABLET | Refills: 2 | Status: SHIPPED | OUTPATIENT
Start: 2020-06-04 | End: 2020-07-22 | Stop reason: SDUPTHER

## 2020-06-22 RX ORDER — SULFASALAZINE 500 MG/1
1500 TABLET ORAL 2 TIMES DAILY
Qty: 540 TABLET | Refills: 0 | Status: SHIPPED | OUTPATIENT
Start: 2020-06-22 | End: 2023-05-18 | Stop reason: SDUPTHER

## 2020-07-01 DIAGNOSIS — M05.79 RHEUMATOID ARTHRITIS INVOLVING MULTIPLE SITES WITH POSITIVE RHEUMATOID FACTOR: ICD-10-CM

## 2020-07-02 RX ORDER — FOLIC ACID 1 MG/1
TABLET ORAL
Qty: 60 TABLET | Refills: 2 | Status: SHIPPED | OUTPATIENT
Start: 2020-07-02 | End: 2020-10-27

## 2020-07-13 ENCOUNTER — PATIENT MESSAGE (OUTPATIENT)
Dept: RHEUMATOLOGY | Facility: CLINIC | Age: 50
End: 2020-07-13

## 2020-07-13 DIAGNOSIS — R53.83 FATIGUE, UNSPECIFIED TYPE: ICD-10-CM

## 2020-07-13 DIAGNOSIS — E55.9 VITAMIN D DEFICIENCY DISEASE: ICD-10-CM

## 2020-07-13 DIAGNOSIS — M05.79 RHEUMATOID ARTHRITIS INVOLVING MULTIPLE SITES WITH POSITIVE RHEUMATOID FACTOR: Primary | ICD-10-CM

## 2020-07-13 DIAGNOSIS — D84.9 IMMUNOSUPPRESSION: ICD-10-CM

## 2020-07-14 ENCOUNTER — OFFICE VISIT (OUTPATIENT)
Dept: RHEUMATOLOGY | Facility: CLINIC | Age: 50
End: 2020-07-14
Payer: COMMERCIAL

## 2020-07-14 ENCOUNTER — HOSPITAL ENCOUNTER (OUTPATIENT)
Dept: RADIOLOGY | Facility: HOSPITAL | Age: 50
Discharge: HOME | End: 2020-07-14
Attending: INTERNAL MEDICINE
Payer: COMMERCIAL

## 2020-07-14 VITALS
HEART RATE: 61 BPM | DIASTOLIC BLOOD PRESSURE: 85 MMHG | SYSTOLIC BLOOD PRESSURE: 140 MMHG | HEIGHT: 63 IN | WEIGHT: 242.31 LBS | BODY MASS INDEX: 42.93 KG/M2 | TEMPERATURE: 98 F

## 2020-07-14 DIAGNOSIS — M25.662 KNEE STIFFNESS, LEFT: ICD-10-CM

## 2020-07-14 DIAGNOSIS — M05.79 RHEUMATOID ARTHRITIS INVOLVING MULTIPLE SITES WITH POSITIVE RHEUMATOID FACTOR: ICD-10-CM

## 2020-07-14 DIAGNOSIS — I10 ESSENTIAL HYPERTENSION: ICD-10-CM

## 2020-07-14 DIAGNOSIS — M05.79 RHEUMATOID ARTHRITIS INVOLVING MULTIPLE SITES WITH POSITIVE RHEUMATOID FACTOR: Primary | ICD-10-CM

## 2020-07-14 PROCEDURE — 77077 JOINT SURVEY SINGLE VIEW: CPT | Mod: TC

## 2020-07-14 PROCEDURE — 3008F PR BODY MASS INDEX (BMI) DOCUMENTED: ICD-10-PCS | Mod: CPTII,S$GLB,, | Performed by: INTERNAL MEDICINE

## 2020-07-14 PROCEDURE — 99999 PR PBB SHADOW E&M-EST. PATIENT-LVL IV: ICD-10-PCS | Mod: PBBFAC,,, | Performed by: INTERNAL MEDICINE

## 2020-07-14 PROCEDURE — 99214 PR OFFICE/OUTPT VISIT, EST, LEVL IV, 30-39 MIN: ICD-10-PCS | Mod: S$GLB,,, | Performed by: INTERNAL MEDICINE

## 2020-07-14 PROCEDURE — 77077 XR ARTHRITIS SURVEY: ICD-10-PCS | Mod: 26,,, | Performed by: RADIOLOGY

## 2020-07-14 PROCEDURE — 3079F PR MOST RECENT DIASTOLIC BLOOD PRESSURE 80-89 MM HG: ICD-10-PCS | Mod: CPTII,S$GLB,, | Performed by: INTERNAL MEDICINE

## 2020-07-14 PROCEDURE — 3079F DIAST BP 80-89 MM HG: CPT | Mod: CPTII,S$GLB,, | Performed by: INTERNAL MEDICINE

## 2020-07-14 PROCEDURE — 3008F BODY MASS INDEX DOCD: CPT | Mod: CPTII,S$GLB,, | Performed by: INTERNAL MEDICINE

## 2020-07-14 PROCEDURE — 77077 JOINT SURVEY SINGLE VIEW: CPT | Mod: 26,,, | Performed by: RADIOLOGY

## 2020-07-14 PROCEDURE — 99999 PR PBB SHADOW E&M-EST. PATIENT-LVL IV: CPT | Mod: PBBFAC,,, | Performed by: INTERNAL MEDICINE

## 2020-07-14 PROCEDURE — 99214 OFFICE O/P EST MOD 30 MIN: CPT | Mod: S$GLB,,, | Performed by: INTERNAL MEDICINE

## 2020-07-14 PROCEDURE — 3077F PR MOST RECENT SYSTOLIC BLOOD PRESSURE >= 140 MM HG: ICD-10-PCS | Mod: CPTII,S$GLB,, | Performed by: INTERNAL MEDICINE

## 2020-07-14 PROCEDURE — 3077F SYST BP >= 140 MM HG: CPT | Mod: CPTII,S$GLB,, | Performed by: INTERNAL MEDICINE

## 2020-07-14 RX ORDER — DICLOFENAC SODIUM 10 MG/G
4 GEL TOPICAL 4 TIMES DAILY
Qty: 5 TUBE | Refills: 2 | OUTPATIENT
Start: 2020-07-14 | End: 2021-01-30

## 2020-07-14 ASSESSMENT — ROUTINE ASSESSMENT OF PATIENT INDEX DATA (RAPID3)
FATIGUE SCORE: 0
AM STIFFNESS SCORE: 1, YES
WHEN YOU AWAKENED IN THE MORNING OVER THE LAST WEEK, PLEASE INDICATE THE AMOUNT OF TIME IT TAKES UNTIL YOU ARE AS LIMBER AS YOU WILL BE FOR THE DAY: 0
TOTAL RAPID3 SCORE: 3
PATIENT GLOBAL ASSESSMENT SCORE: 4
PAIN SCORE: 4
PSYCHOLOGICAL DISTRESS SCORE: 0
MDHAQ FUNCTION SCORE: 0.3

## 2020-07-14 NOTE — PROGRESS NOTES
Rapid3 Question Responses and Scores 7/13/2020   MDHAQ Score 0.3   Psychologic Score 0   Pain Score 4   When you awakened in the morning OVER THE LAST WEEK, did you feel stiff? Yes   If Yes, please indicate the number of hours until you are as limber as you will be for the day 0   Fatigue Score 0   Global Health Score 4   RAPID3 Score 3       Answers for HPI/ROS submitted by the patient on 7/13/2020   fever: No  eye redness: No  headaches: No  shortness of breath: No  chest pain: No  trouble swallowing: No  diarrhea: No  constipation: No  unexpected weight change: No  genital sore: No  dysuria: No  During the last 3 days, have you had a skin rash?: No  Bruises or bleeds easily: No  cough: No

## 2020-07-14 NOTE — PROGRESS NOTES
"Subjective:       Patient ID: Antonio Ruiz is a 50 y.o. female.    Chief Complaint: Rheumatoid arthritis    HPI:  Antonio Ruiz is a 50 y.o. female with a history   of rheumatoid arthritis manifested by symmetrical small joint involvement,   history of elbow contracture on the right in the past, positive rheumatoid   factor, positive WILMA. Humira stopped due to rash and pain.  Orencia did not help.    The patient currently takes Methotrexate 20 mg weekly, folic acid daily, sulfasalazine 1500 mg twice a day and Plaquenil 200 mg bid.    Patient off prednisone.       Denies pain. Left knee fullness behind the knee with stiffness.    No morning stiffness.          Review of Systems   Constitutional: Positive for fatigue. Negative for fever and unexpected weight change (prednisone causing weight gain).   HENT: Negative.  Negative for trouble swallowing.    Eyes: Negative.  Negative for redness.   Respiratory: Negative for cough and shortness of breath.    Cardiovascular: Negative.  Negative for chest pain.   Gastrointestinal: Negative.  Negative for constipation and diarrhea.   Endocrine: Negative.    Genitourinary: Negative.  Negative for dysuria and genital sores.   Musculoskeletal: Positive for arthralgias.   Skin: Negative for rash.   Allergic/Immunologic: Negative.    Neurological: Negative.  Negative for headaches.   Hematological: Negative.  Does not bruise/bleed easily.   Psychiatric/Behavioral: Negative.          Objective:   BP (!) 140/85 (BP Location: Right arm, Patient Position: Sitting, BP Method: Large (Automatic))   Pulse 61   Temp 98.3 °F (36.8 °C) (Oral)   Ht 5' 3" (1.6 m)   Wt 109.9 kg (242 lb 4.6 oz)   BMI 42.92 kg/m²      Physical Exam   Constitutional: She is oriented to person, place, and time and well-developed, well-nourished, and in no distress.   HENT:   Head: Normocephalic and atraumatic.   Eyes: Conjunctivae and EOM are normal.   Neck: Neck supple.   Cardiovascular: Normal " rate, regular rhythm and normal heart sounds.    Pulmonary/Chest: Effort normal and breath sounds normal.   Abdominal: Soft. Bowel sounds are normal.   Neurological: She is alert and oriented to person, place, and time. No cranial nerve deficit. Gait normal.   Skin: Skin is warm and dry.     Psychiatric: Mood and affect normal.   Musculoskeletal: No tenderness, deformity or edema.      Comments: 28 joint count: 0 swollen and 0 tender  No MTP tenderness bilaterally  No pain on top of feet  Fullness behind left knee       LABS    Component      Latest Ref Rng & Units 5/26/2020   WBC      3.4 - 10.8 x10E3/uL 4.3   RBC      3.77 - 5.28 x10E6/uL 3.94   Hemoglobin      11.1 - 15.9 g/dL 11.4   Hematocrit      34.0 - 46.6 % 34.8   MCV      79 - 97 fL 88   MCH      26.6 - 33.0 pg 28.9   MCHC      31.5 - 35.7 g/dL 32.8   RDW      11.7 - 15.4 % 15.9 (H)   Platelets      150 - 450 x10E3/uL 291   Neutrophils      Not Estab. % 69   Lymph%      Not Estab. % 16   Mono%      Not Estab. % 11   Eosinophil%      Not Estab. % 3   Basophil%      Not Estab. % 1   Neutrophils Absolute      1.4 - 7.0 x10E3/uL 3.0   Lymph #      0.7 - 3.1 x10E3/uL 0.7   Mono #      0.1 - 0.9 x10E3/uL 0.5   Eos #      0.0 - 0.4 x10E3/uL 0.1   Baso #      0.0 - 0.2 x10E3/uL 0.0   Immature Granulocytes      Not Estab. % 0   Immature Grans (Abs)      0.0 - 0.1 x10E3/uL 0.0   Glucose      65 - 99 mg/dL 92   BUN, Bld      6 - 24 mg/dL 17   Creatinine      0.57 - 1.00 mg/dL 0.73   eGFR if non African American      >59 mL/min/1.73 95   eGFR if African American      >59 mL/min/1.73 110   BUN/Creatinine Ratio      9 - 23 23   Sodium      134 - 144 mmol/L 138   Potassium      3.5 - 5.2 mmol/L 4.4   Chloride      96 - 106 mmol/L 102   CO2      20 - 29 mmol/L 24   Calcium      8.7 - 10.2 mg/dL 9.5   PROTEIN TOTAL      6.0 - 8.5 g/dL 7.2   Albumin      3.8 - 4.9 g/dL 4.3   Globulin, Total      1.5 - 4.5 g/dL 2.9   Albumin/Globulin Ratio      1.2 - 2.2 1.5   BILIRUBIN  TOTAL      0.0 - 1.2 mg/dL 0.3   Alkaline Phosphatase      39 - 117 IU/L 57   AST      0 - 40 IU/L 14   ALT      0 - 32 IU/L 10   CRP      0 - 10 mg/L 1   Vit D, 25-Hydroxy      30.0 - 100.0 ng/mL 16.6 (L)   Sed Rate      0 - 40 mm/hr 17        Assessment:       1. RA. Failed Humira and Orencia.  She is on Plaquenil, methotrexate and SSZ.  Now feeling well with stiffness in left knee.  2. 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 Rash and Positive WILMA/SSA/ Matthew.  Previously +DS DNA and normal complements.  3. Alopecia.  Hair growing now.  4. Leg edema bilateral after starting Norvasc  5. Diarrhea with medications.  with noncoated SSZ  6. Obesity  7. Osteoporosis prophylaxis.  8. HTN.  Mildly elevated BP.  Patient to follow to primary doctor  9. Right arm lesion.  Similar lesion treated with a foam by dermatology  10.  Vitamin D deficiency  11.  Back pain  12.  Anemia         Plan:       1.  Labs  2.  Continue MTX 20 mg qweek, SSZ and plaquenil.  Continue to hold prednisone.  If needed in future MTX to 25 mg to see if stiffness improves.     3.  Consider Xeljanz.  Hold on Rituxan for now.    4.  Follow up with Dr. Martines in dermatology  5.  Calcium 1,000 mg daily and vitamin D3 5,000 units daily.    6. Plaquenil exam normal 6/26/2020  7.  Voltaren gel 4 g knee qid    RTO 3 months/prn

## 2020-07-15 ENCOUNTER — PATIENT MESSAGE (OUTPATIENT)
Dept: RHEUMATOLOGY | Facility: CLINIC | Age: 50
End: 2020-07-15

## 2020-07-15 LAB
25(OH)D3+25(OH)D2 SERPL-MCNC: 21.5 NG/ML (ref 30–100)
ALBUMIN SERPL-MCNC: 4.3 G/DL (ref 3.8–4.9)
ALBUMIN/GLOB SERPL: 1.4 {RATIO} (ref 1.2–2.2)
ALP SERPL-CCNC: 64 IU/L (ref 39–117)
ALT SERPL-CCNC: 16 IU/L (ref 0–32)
AST SERPL-CCNC: 15 IU/L (ref 0–40)
BASOPHILS # BLD AUTO: 0 X10E3/UL (ref 0–0.2)
BASOPHILS NFR BLD AUTO: 1 %
BILIRUB SERPL-MCNC: 0.4 MG/DL (ref 0–1.2)
BUN SERPL-MCNC: 10 MG/DL (ref 6–24)
BUN/CREAT SERPL: 14 (ref 9–23)
CALCIUM SERPL-MCNC: 9.7 MG/DL (ref 8.7–10.2)
CHLORIDE SERPL-SCNC: 100 MMOL/L (ref 96–106)
CO2 SERPL-SCNC: 21 MMOL/L (ref 20–29)
CREAT SERPL-MCNC: 0.72 MG/DL (ref 0.57–1)
CRP SERPL-MCNC: 2 MG/L (ref 0–10)
EOSINOPHIL # BLD AUTO: 0.1 X10E3/UL (ref 0–0.4)
EOSINOPHIL NFR BLD AUTO: 2 %
ERYTHROCYTE [DISTWIDTH] IN BLOOD BY AUTOMATED COUNT: 15.3 % (ref 11.7–15.4)
ERYTHROCYTE [SEDIMENTATION RATE] IN BLOOD BY WESTERGREN METHOD: 50 MM/HR (ref 0–40)
GLOBULIN SER CALC-MCNC: 3.1 G/DL (ref 1.5–4.5)
GLUCOSE SERPL-MCNC: 121 MG/DL (ref 65–99)
HCT VFR BLD AUTO: 36.1 % (ref 34–46.6)
HGB BLD-MCNC: 11.6 G/DL (ref 11.1–15.9)
IMM GRANULOCYTES # BLD AUTO: 0 X10E3/UL (ref 0–0.1)
IMM GRANULOCYTES NFR BLD AUTO: 0 %
LYMPHOCYTES # BLD AUTO: 1 X10E3/UL (ref 0.7–3.1)
LYMPHOCYTES NFR BLD AUTO: 23 %
MCH RBC QN AUTO: 28 PG (ref 26.6–33)
MCHC RBC AUTO-ENTMCNC: 32.1 G/DL (ref 31.5–35.7)
MCV RBC AUTO: 87 FL (ref 79–97)
MONOCYTES # BLD AUTO: 0.3 X10E3/UL (ref 0.1–0.9)
MONOCYTES NFR BLD AUTO: 7 %
NEUTROPHILS # BLD AUTO: 3 X10E3/UL (ref 1.4–7)
NEUTROPHILS NFR BLD AUTO: 67 %
PLATELET # BLD AUTO: 297 X10E3/UL (ref 150–450)
POTASSIUM SERPL-SCNC: 3.7 MMOL/L (ref 3.5–5.2)
PROT SERPL-MCNC: 7.4 G/DL (ref 6–8.5)
RBC # BLD AUTO: 4.14 X10E6/UL (ref 3.77–5.28)
SODIUM SERPL-SCNC: 138 MMOL/L (ref 134–144)
WBC # BLD AUTO: 4.4 X10E3/UL (ref 3.4–10.8)

## 2020-07-21 ENCOUNTER — PATIENT MESSAGE (OUTPATIENT)
Dept: RHEUMATOLOGY | Facility: CLINIC | Age: 50
End: 2020-07-21

## 2020-07-21 DIAGNOSIS — M05.79 RHEUMATOID ARTHRITIS INVOLVING MULTIPLE SITES WITH POSITIVE RHEUMATOID FACTOR: Primary | ICD-10-CM

## 2020-07-21 DIAGNOSIS — D84.9 IMMUNOSUPPRESSION: ICD-10-CM

## 2020-07-22 RX ORDER — METHOTREXATE 2.5 MG/1
25 TABLET ORAL
Qty: 40 TABLET | Refills: 2 | Status: SHIPPED | OUTPATIENT
Start: 2020-07-22 | End: 2020-10-27

## 2020-07-27 NOTE — PROGRESS NOTES
Digital Medicine: Health  Follow-Up    The history is provided by the patient.             Reason for review: Blood pressure at goal      Patient needs assistance troubleshooting: tech support needed.      Topics Covered on Call: physical activity    Additional Follow-up details: Ms. Ruiz reports that she's doing well. Patient reports that she's been submitting readings, but she's been getting texts that we haven't received a readings from her. Last reading we have is from 7/5/20. Will set CRM for patient.         Diet-Not assessed      Additional diet details:    Physical Activity-Change      She added walking to Her physical activity routine.    She removed weights from Her physical activity.    She identified the following barriers to physical activity: arthritis        Additional physical activity details: Patient reports that she's started exercising and that's helped her BP readings. She's walking in her neighborhood. She was doing weight, but she said that she had to stop that due to arthritis.       Medication Adherence-Medication Adherence not addressed.      Substance, Sleep, Stress-Not assessed    PLAN  Continue current diet/physical activity routine: continue walking program  Placed task for tech support: Patient's readings aren't coming through    Patient verbalizes understanding. Patient did not express questions or concerns and patient has contact information if needed.    Explained the importance of self-monitoring and medication adherence. Encouraged the patient to communicate with their health  for lifestyle modifications to help improve or maintain a healthy lifestyle.        There are no preventive care reminders to display for this patient.    Last 5 Patient Entered Readings                                      Current 30 Day Average: 117/73     Recent Readings 7/5/2020 6/27/2020 6/25/2020 6/22/2020 6/8/2020    SBP (mmHg) 116 117 122 133 136    DBP (mmHg) 70 75 79 84 82    Pulse 78  65 70 67 53

## 2020-08-12 ENCOUNTER — PATIENT MESSAGE (OUTPATIENT)
Dept: RHEUMATOLOGY | Facility: CLINIC | Age: 50
End: 2020-08-12

## 2020-08-13 NOTE — TELEPHONE ENCOUNTER
Patient reports severe pain in left knee that was stabbing and throbbing.  Pain is improved after use of topical NSAID.  We did increase methotrexate dose and she has taken it twice.  We will see if increasing the dose to 10 tabs has made much improvement when she has her labs done that are already schedules.

## 2020-09-06 ENCOUNTER — TELEPHONE (OUTPATIENT)
Dept: RHEUMATOLOGY | Facility: CLINIC | Age: 50
End: 2020-09-06

## 2020-09-06 DIAGNOSIS — D64.9 ANEMIA, UNSPECIFIED TYPE: Primary | ICD-10-CM

## 2020-09-08 ENCOUNTER — PATIENT MESSAGE (OUTPATIENT)
Dept: RHEUMATOLOGY | Facility: CLINIC | Age: 50
End: 2020-09-08

## 2020-09-08 DIAGNOSIS — D64.9 ANEMIA, UNSPECIFIED TYPE: Primary | ICD-10-CM

## 2020-09-08 DIAGNOSIS — D84.9 IMMUNOSUPPRESSION: ICD-10-CM

## 2020-09-08 DIAGNOSIS — M05.79 RHEUMATOID ARTHRITIS INVOLVING MULTIPLE SITES WITH POSITIVE RHEUMATOID FACTOR: ICD-10-CM

## 2020-09-08 LAB
ALBUMIN SERPL-MCNC: 3.9 G/DL (ref 3.8–4.9)
ALBUMIN/GLOB SERPL: 1.3 {RATIO} (ref 1.2–2.2)
ALP SERPL-CCNC: 56 IU/L (ref 39–117)
ALT SERPL-CCNC: 11 IU/L (ref 0–32)
AST SERPL-CCNC: 13 IU/L (ref 0–40)
BASOPHILS # BLD AUTO: 0 X10E3/UL (ref 0–0.2)
BASOPHILS NFR BLD AUTO: 0 %
BILIRUB SERPL-MCNC: <0.2 MG/DL (ref 0–1.2)
BUN SERPL-MCNC: 19 MG/DL (ref 6–24)
BUN/CREAT SERPL: 24 (ref 9–23)
CALCIUM SERPL-MCNC: 9.5 MG/DL (ref 8.7–10.2)
CHLORIDE SERPL-SCNC: 105 MMOL/L (ref 96–106)
CO2 SERPL-SCNC: 20 MMOL/L (ref 20–29)
CREAT SERPL-MCNC: 0.79 MG/DL (ref 0.57–1)
CRP SERPL-MCNC: <1 MG/L (ref 0–10)
EOSINOPHIL # BLD AUTO: 0.2 X10E3/UL (ref 0–0.4)
EOSINOPHIL NFR BLD AUTO: 4 %
ERYTHROCYTE [DISTWIDTH] IN BLOOD BY AUTOMATED COUNT: 15.6 % (ref 11.7–15.4)
ERYTHROCYTE [SEDIMENTATION RATE] IN BLOOD BY WESTERGREN METHOD: NORMAL MM/HR
GLOBULIN SER CALC-MCNC: 3 G/DL (ref 1.5–4.5)
GLUCOSE SERPL-MCNC: 96 MG/DL (ref 65–99)
HCT VFR BLD AUTO: 34.2 % (ref 34–46.6)
HGB BLD-MCNC: 10.8 G/DL (ref 11.1–15.9)
IMM GRANULOCYTES # BLD AUTO: 0 X10E3/UL (ref 0–0.1)
IMM GRANULOCYTES NFR BLD AUTO: 0 %
LYMPHOCYTES # BLD AUTO: 1.1 X10E3/UL (ref 0.7–3.1)
LYMPHOCYTES NFR BLD AUTO: 24 %
MCH RBC QN AUTO: 28.6 PG (ref 26.6–33)
MCHC RBC AUTO-ENTMCNC: 31.6 G/DL (ref 31.5–35.7)
MCV RBC AUTO: 91 FL (ref 79–97)
MONOCYTES # BLD AUTO: 0.4 X10E3/UL (ref 0.1–0.9)
MONOCYTES NFR BLD AUTO: 8 %
NEUTROPHILS # BLD AUTO: 2.9 X10E3/UL (ref 1.4–7)
NEUTROPHILS NFR BLD AUTO: 64 %
PLATELET # BLD AUTO: 285 X10E3/UL (ref 150–450)
POTASSIUM SERPL-SCNC: 4.2 MMOL/L (ref 3.5–5.2)
PROT SERPL-MCNC: 6.9 G/DL (ref 6–8.5)
RBC # BLD AUTO: 3.78 X10E6/UL (ref 3.77–5.28)
SODIUM SERPL-SCNC: 142 MMOL/L (ref 134–144)
WBC # BLD AUTO: 4.6 X10E3/UL (ref 3.4–10.8)

## 2020-09-09 ENCOUNTER — PATIENT MESSAGE (OUTPATIENT)
Dept: RHEUMATOLOGY | Facility: CLINIC | Age: 50
End: 2020-09-09

## 2020-09-21 ENCOUNTER — PATIENT OUTREACH (OUTPATIENT)
Dept: OTHER | Facility: OTHER | Age: 50
End: 2020-09-21

## 2020-09-28 LAB
FERRITIN SERPL-MCNC: 47 NG/ML (ref 15–150)
FOLATE SERPL-MCNC: 14.9 NG/ML
IRON SATN MFR SERPL: 18 % (ref 15–55)
IRON SERPL-MCNC: 58 UG/DL (ref 27–159)
STFR SERPL-SCNC: 20.3 NMOL/L (ref 12.2–27.3)
TIBC SERPL-MCNC: 315 UG/DL (ref 250–450)
UIBC SERPL-MCNC: 257 UG/DL (ref 131–425)
VIT B12 SERPL-MCNC: 757 PG/ML (ref 232–1245)

## 2020-10-13 ENCOUNTER — PATIENT OUTREACH (OUTPATIENT)
Dept: ADMINISTRATIVE | Facility: OTHER | Age: 50
End: 2020-10-13

## 2020-10-13 DIAGNOSIS — Z12.11 ENCOUNTER FOR FIT (FECAL IMMUNOCHEMICAL TEST) SCREENING: Primary | ICD-10-CM

## 2020-10-13 NOTE — PROGRESS NOTES
Care Everywhere: updated  Immunization: updated(delay in links)   Health Maintenance: updated  Media Review: review for outside colon cancer report   [facility]acy Review: review 2009 colonoscopy report   Order placed: fecal immunochemical test   Upcoming appts:

## 2020-10-26 RX ORDER — HYDROXYCHLOROQUINE SULFATE 200 MG/1
TABLET, FILM COATED ORAL
Qty: 60 TABLET | Refills: 2 | Status: SHIPPED | OUTPATIENT
Start: 2020-10-26 | End: 2020-10-28 | Stop reason: SDUPTHER

## 2020-10-27 ENCOUNTER — PATIENT MESSAGE (OUTPATIENT)
Dept: RHEUMATOLOGY | Facility: CLINIC | Age: 50
End: 2020-10-27

## 2020-10-28 ENCOUNTER — PATIENT MESSAGE (OUTPATIENT)
Dept: RHEUMATOLOGY | Facility: CLINIC | Age: 50
End: 2020-10-28

## 2020-10-28 DIAGNOSIS — I10 ESSENTIAL HYPERTENSION: Primary | ICD-10-CM

## 2020-10-28 DIAGNOSIS — M05.79 RHEUMATOID ARTHRITIS INVOLVING MULTIPLE SITES WITH POSITIVE RHEUMATOID FACTOR: ICD-10-CM

## 2020-10-28 RX ORDER — HYDROXYCHLOROQUINE SULFATE 200 MG/1
200 TABLET, FILM COATED ORAL 2 TIMES DAILY
Qty: 60 TABLET | Refills: 2 | Status: SHIPPED | OUTPATIENT
Start: 2020-10-28 | End: 2020-11-05 | Stop reason: SDUPTHER

## 2020-10-28 RX ORDER — METHOTREXATE 2.5 MG/1
TABLET ORAL
Qty: 40 TABLET | Refills: 2 | Status: SHIPPED | OUTPATIENT
Start: 2020-10-28 | End: 2020-11-05 | Stop reason: SDUPTHER

## 2020-10-28 RX ORDER — FOLIC ACID 1 MG/1
TABLET ORAL
Qty: 60 TABLET | Refills: 2 | Status: SHIPPED | OUTPATIENT
Start: 2020-10-28 | End: 2020-11-05 | Stop reason: SDUPTHER

## 2020-10-28 RX ORDER — AMLODIPINE BESYLATE 5 MG/1
5 TABLET ORAL DAILY
Qty: 90 TABLET | Refills: 2 | Status: SHIPPED | OUTPATIENT
Start: 2020-10-28 | End: 2021-08-04

## 2020-10-30 RX ORDER — PREDNISONE 10 MG/1
10 TABLET ORAL DAILY
Qty: 30 TABLET | Refills: 0 | Status: SHIPPED | OUTPATIENT
Start: 2020-10-30 | End: 2021-07-28

## 2020-11-01 ENCOUNTER — PATIENT MESSAGE (OUTPATIENT)
Dept: RHEUMATOLOGY | Facility: CLINIC | Age: 50
End: 2020-11-01

## 2020-11-02 ENCOUNTER — PATIENT MESSAGE (OUTPATIENT)
Dept: ADMINISTRATIVE | Facility: OTHER | Age: 50
End: 2020-11-02

## 2020-11-02 ENCOUNTER — IMMUNIZATION (OUTPATIENT)
Dept: PHARMACY | Facility: CLINIC | Age: 50
End: 2020-11-02
Payer: COMMERCIAL

## 2020-11-03 ENCOUNTER — OFFICE VISIT (OUTPATIENT)
Dept: RHEUMATOLOGY | Facility: CLINIC | Age: 50
End: 2020-11-03
Payer: COMMERCIAL

## 2020-11-03 ENCOUNTER — OFFICE VISIT (OUTPATIENT)
Dept: INTERNAL MEDICINE | Facility: CLINIC | Age: 50
End: 2020-11-03
Attending: INTERNAL MEDICINE
Payer: COMMERCIAL

## 2020-11-03 VITALS
HEIGHT: 63 IN | SYSTOLIC BLOOD PRESSURE: 136 MMHG | HEART RATE: 71 BPM | BODY MASS INDEX: 42.11 KG/M2 | WEIGHT: 237.63 LBS | OXYGEN SATURATION: 99 % | DIASTOLIC BLOOD PRESSURE: 66 MMHG

## 2020-11-03 VITALS
WEIGHT: 241.38 LBS | BODY MASS INDEX: 42.77 KG/M2 | TEMPERATURE: 98 F | DIASTOLIC BLOOD PRESSURE: 84 MMHG | SYSTOLIC BLOOD PRESSURE: 142 MMHG | HEIGHT: 63 IN | HEART RATE: 62 BPM

## 2020-11-03 DIAGNOSIS — D84.9 IMMUNOSUPPRESSION: ICD-10-CM

## 2020-11-03 DIAGNOSIS — Z12.31 BREAST CANCER SCREENING BY MAMMOGRAM: ICD-10-CM

## 2020-11-03 DIAGNOSIS — Z79.52 LONG TERM SYSTEMIC STEROID USER: ICD-10-CM

## 2020-11-03 DIAGNOSIS — M05.79 RHEUMATOID ARTHRITIS INVOLVING MULTIPLE SITES WITH POSITIVE RHEUMATOID FACTOR: Primary | ICD-10-CM

## 2020-11-03 DIAGNOSIS — E55.9 VITAMIN D DEFICIENCY DISEASE: ICD-10-CM

## 2020-11-03 DIAGNOSIS — Z79.631 ON METHOTREXATE THERAPY: ICD-10-CM

## 2020-11-03 DIAGNOSIS — Z11.4 ENCOUNTER FOR SCREENING FOR HIV: ICD-10-CM

## 2020-11-03 DIAGNOSIS — I10 ESSENTIAL HYPERTENSION: ICD-10-CM

## 2020-11-03 DIAGNOSIS — Z00.00 ANNUAL PHYSICAL EXAM: Primary | ICD-10-CM

## 2020-11-03 DIAGNOSIS — M25.562 CHRONIC PAIN OF LEFT KNEE: ICD-10-CM

## 2020-11-03 DIAGNOSIS — G89.29 CHRONIC PAIN OF LEFT KNEE: ICD-10-CM

## 2020-11-03 PROCEDURE — 3079F DIAST BP 80-89 MM HG: CPT | Mod: CPTII,S$GLB,, | Performed by: INTERNAL MEDICINE

## 2020-11-03 PROCEDURE — 99999 PR PBB SHADOW E&M-EST. PATIENT-LVL IV: ICD-10-PCS | Mod: PBBFAC,,, | Performed by: INTERNAL MEDICINE

## 2020-11-03 PROCEDURE — 3008F PR BODY MASS INDEX (BMI) DOCUMENTED: ICD-10-PCS | Mod: CPTII,S$GLB,, | Performed by: INTERNAL MEDICINE

## 2020-11-03 PROCEDURE — 99396 PREV VISIT EST AGE 40-64: CPT | Mod: S$GLB,,, | Performed by: INTERNAL MEDICINE

## 2020-11-03 PROCEDURE — 20610 PR DRAIN/INJECT LARGE JOINT/BURSA: ICD-10-PCS | Mod: LT,S$GLB,, | Performed by: INTERNAL MEDICINE

## 2020-11-03 PROCEDURE — 3074F SYST BP LT 130 MM HG: CPT | Mod: CPTII,S$GLB,, | Performed by: INTERNAL MEDICINE

## 2020-11-03 PROCEDURE — 99999 PR PBB SHADOW E&M-EST. PATIENT-LVL IV: CPT | Mod: PBBFAC,,, | Performed by: INTERNAL MEDICINE

## 2020-11-03 PROCEDURE — 3008F BODY MASS INDEX DOCD: CPT | Mod: CPTII,S$GLB,, | Performed by: INTERNAL MEDICINE

## 2020-11-03 PROCEDURE — 3078F DIAST BP <80 MM HG: CPT | Mod: CPTII,S$GLB,, | Performed by: INTERNAL MEDICINE

## 2020-11-03 PROCEDURE — 3074F PR MOST RECENT SYSTOLIC BLOOD PRESSURE < 130 MM HG: ICD-10-PCS | Mod: CPTII,S$GLB,, | Performed by: INTERNAL MEDICINE

## 2020-11-03 PROCEDURE — 99214 PR OFFICE/OUTPT VISIT, EST, LEVL IV, 30-39 MIN: ICD-10-PCS | Mod: 25,S$GLB,, | Performed by: INTERNAL MEDICINE

## 2020-11-03 PROCEDURE — 1125F AMNT PAIN NOTED PAIN PRSNT: CPT | Mod: S$GLB,,, | Performed by: INTERNAL MEDICINE

## 2020-11-03 PROCEDURE — 3075F SYST BP GE 130 - 139MM HG: CPT | Mod: CPTII,S$GLB,, | Performed by: INTERNAL MEDICINE

## 2020-11-03 PROCEDURE — 3079F PR MOST RECENT DIASTOLIC BLOOD PRESSURE 80-89 MM HG: ICD-10-PCS | Mod: CPTII,S$GLB,, | Performed by: INTERNAL MEDICINE

## 2020-11-03 PROCEDURE — 99214 OFFICE O/P EST MOD 30 MIN: CPT | Mod: 25,S$GLB,, | Performed by: INTERNAL MEDICINE

## 2020-11-03 PROCEDURE — 20610 DRAIN/INJ JOINT/BURSA W/O US: CPT | Mod: LT,S$GLB,, | Performed by: INTERNAL MEDICINE

## 2020-11-03 PROCEDURE — 99396 PR PREVENTIVE VISIT,EST,40-64: ICD-10-PCS | Mod: S$GLB,,, | Performed by: INTERNAL MEDICINE

## 2020-11-03 PROCEDURE — 3075F PR MOST RECENT SYSTOLIC BLOOD PRESS GE 130-139MM HG: ICD-10-PCS | Mod: CPTII,S$GLB,, | Performed by: INTERNAL MEDICINE

## 2020-11-03 PROCEDURE — 3078F PR MOST RECENT DIASTOLIC BLOOD PRESSURE < 80 MM HG: ICD-10-PCS | Mod: CPTII,S$GLB,, | Performed by: INTERNAL MEDICINE

## 2020-11-03 PROCEDURE — 1125F PR PAIN SEVERITY QUANTIFIED, PAIN PRESENT: ICD-10-PCS | Mod: S$GLB,,, | Performed by: INTERNAL MEDICINE

## 2020-11-03 PROCEDURE — 99999 PR PBB SHADOW E&M-EST. PATIENT-LVL V: ICD-10-PCS | Mod: PBBFAC,,, | Performed by: INTERNAL MEDICINE

## 2020-11-03 PROCEDURE — 99999 PR PBB SHADOW E&M-EST. PATIENT-LVL V: CPT | Mod: PBBFAC,,, | Performed by: INTERNAL MEDICINE

## 2020-11-03 RX ORDER — TRIAMCINOLONE ACETONIDE 40 MG/ML
40 INJECTION, SUSPENSION INTRA-ARTICULAR; INTRAMUSCULAR
Status: COMPLETED | OUTPATIENT
Start: 2020-11-03 | End: 2020-11-03

## 2020-11-03 RX ADMIN — TRIAMCINOLONE ACETONIDE 40 MG: 40 INJECTION, SUSPENSION INTRA-ARTICULAR; INTRAMUSCULAR at 11:11

## 2020-11-03 ASSESSMENT — ROUTINE ASSESSMENT OF PATIENT INDEX DATA (RAPID3)
PAIN SCORE: 8.5
AM STIFFNESS SCORE: 1, YES
TOTAL RAPID3 SCORE: 4.72
FATIGUE SCORE: 1
WHEN YOU AWAKENED IN THE MORNING OVER THE LAST WEEK, PLEASE INDICATE THE AMOUNT OF TIME IT TAKES UNTIL YOU ARE AS LIMBER AS YOU WILL BE FOR THE DAY: .03
PATIENT GLOBAL ASSESSMENT SCORE: 3
MDHAQ FUNCTION SCORE: 0.8
PSYCHOLOGICAL DISTRESS SCORE: 1.1

## 2020-11-03 NOTE — PROGRESS NOTES
Rapid3 Question Responses and Scores 11/2/2020   MDHAQ Score 0.8   Psychologic Score 1.1   Pain Score 8.5   When you awakened in the morning OVER THE LAST WEEK, did you feel stiff? Yes   If Yes, please indicate the number of hours until you are as limber as you will be for the day 0.03   Fatigue Score 1   Global Health Score 3   RAPID3 Score 4.72       Answers for HPI/ROS submitted by the patient on 11/2/2020   fever: No  eye redness: No  mouth sores: No  headaches: No  shortness of breath: No  chest pain: No  trouble swallowing: No  diarrhea: No  constipation: No  unexpected weight change: No  genital sore: No  dysuria: No  During the last 3 days, have you had a skin rash?: No  Bruises or bleeds easily: No  cough: No

## 2020-11-03 NOTE — PROGRESS NOTES
"Subjective:       Patient ID: Antonio Ruiz is a 50 y.o. female.    Chief Complaint: Annual Exam    Here for annual exam    51 yo F with PMHx of RA, HTN, Anemia (mixed JUSTINO/AOCD)obesity,   Patient presents today for routine evaluation, physical, and labs. Patient has no major concerns or complaints today.     ---Rheumatoid Arthritis----  of rheumatoid arthritis manifested by symmetrical small joint involvement,   history of elbow contracture on the right in the past, positive rheumatoid   factor, positive WILMA. Humira stopped due to rash and pain.  Orencia did not help.     The patient currently takes Methotrexate 20 mg weekly, folic acid daily, sulfasalazine 1500 mg twice a day and Plaquenil 200 mg bid.    Patient off prednisone.          Review of Systems   Constitutional: Negative for chills, fatigue, fever and unexpected weight change.   HENT: Negative for ear pain, hearing loss, postnasal drip, tinnitus, trouble swallowing and voice change.    Respiratory: Negative for cough, chest tightness, shortness of breath and wheezing.    Cardiovascular: Negative for chest pain, palpitations and leg swelling.   Gastrointestinal: Negative for abdominal pain, blood in stool, diarrhea, nausea and vomiting.   Endocrine: Negative for polydipsia, polyphagia and polyuria.   Genitourinary: Negative for difficulty urinating, dysuria, hematuria and vaginal bleeding.   Musculoskeletal: Positive for arthralgias.   Skin: Negative for rash.   Allergic/Immunologic: Negative for food allergies.   Neurological: Negative for dizziness, numbness and headaches.   Hematological: Does not bruise/bleed easily.   Psychiatric/Behavioral: The patient is not nervous/anxious.        Objective:      Vitals:    11/03/20 1314   BP: 136/66   Pulse: 71   SpO2: 99%   Weight: 107.8 kg (237 lb 10.5 oz)   Height: 5' 3" (1.6 m)      Physical Exam  Constitutional:       General: She is not in acute distress.     Appearance: She is well-developed.   HENT: "      Head: Normocephalic and atraumatic.      Mouth/Throat:      Pharynx: No oropharyngeal exudate.   Eyes:      General: No scleral icterus.     Conjunctiva/sclera: Conjunctivae normal.      Pupils: Pupils are equal, round, and reactive to light.   Neck:      Thyroid: No thyromegaly.   Cardiovascular:      Rate and Rhythm: Normal rate and regular rhythm.      Heart sounds: Normal heart sounds. No murmur.   Pulmonary:      Effort: Pulmonary effort is normal.      Breath sounds: Normal breath sounds. No wheezing or rales.   Abdominal:      General: There is no distension.      Palpations: Abdomen is soft.      Tenderness: There is no abdominal tenderness.   Musculoskeletal:         General: No tenderness.   Lymphadenopathy:      Cervical: No cervical adenopathy.   Skin:     General: Skin is warm and dry.   Neurological:      Mental Status: She is alert and oriented to person, place, and time.   Psychiatric:         Behavior: Behavior normal.         Assessment:       1. Annual physical exam    2. Breast cancer screening by mammogram    3. Encounter for screening for HIV    4. On methotrexate therapy    5. Essential hypertension    6. Vitamin D deficiency disease    7. Long term systemic steroid user        Plan:       Antonio was seen today for annual exam.    Diagnoses and all orders for this visit:    Annual physical exam  -     Lipid Panel; Future  -     Lipid Panel    Breast cancer screening by mammogram  -     Mammo Digital Screening Bilat w/ Dimitry; Future  -     Mammo Digital Screening Bilat w/ [facility]    Encounter for screening for HIV  -     HIV 1/2 Ag/Ab (4th Gen); Future  -     HIV 1/2 Ag/Ab (4th Gen)    On methotrexate therapy    Essential hypertension   SBP above goal. Tolerating meds well. Pt denies cp/sob/ha/vision or neuro changes. Monitor.      Long term systemic steroid user           Emeka Phillips MD  Internal Medicine-[facility]        Side effects of medication(s) were discussed in detail  and patient voiced understanding.  Patient will call back for any issues or complications.

## 2020-11-03 NOTE — PROGRESS NOTES
"Subjective:       Patient ID: Antonio Ruiz is a 50 y.o. female.    Chief Complaint: Rheumatoid arthritis    HPI:  Antonio Ruiz is a 50 y.o. female with a history   of rheumatoid arthritis manifested by symmetrical small joint involvement,   history of elbow contracture on the right in the past, positive rheumatoid   factor, positive WILMA. Humira stopped due to rash and pain.  Orencia did not help.    The patient currently takes Methotrexate 20 mg weekly, folic acid daily, sulfasalazine 1500 mg twice a day and Plaquenil 200 mg bid.    Patient off prednisone.       7/10 ache in left pain.  Began after last visit.       No morning stiffness.          Review of Systems   Constitutional: Positive for fatigue. Negative for fever and unexpected weight change (prednisone causing weight gain).   HENT: Negative.  Negative for mouth sores and trouble swallowing.    Eyes: Negative.  Negative for redness.   Respiratory: Negative for cough and shortness of breath.    Cardiovascular: Negative.  Negative for chest pain.   Gastrointestinal: Negative.  Negative for constipation and diarrhea.   Endocrine: Negative.    Genitourinary: Negative.  Negative for dysuria and genital sores.   Musculoskeletal: Positive for arthralgias.   Skin: Negative for rash.   Allergic/Immunologic: Negative.    Neurological: Negative.  Negative for headaches.   Hematological: Negative.  Does not bruise/bleed easily.   Psychiatric/Behavioral: Negative.          Objective:   BP (!) 142/84 (BP Location: Right arm, Patient Position: Sitting, BP Method: Large (Automatic))   Pulse 62   Temp 98.4 °F (36.9 °C) (Oral)   Ht 5' 3" (1.6 m)   Wt 109.5 kg (241 lb 6.5 oz)   BMI 42.76 kg/m²      Physical Exam   Constitutional: She is oriented to person, place, and time and well-developed, well-nourished, and in no distress.   HENT:   Head: Normocephalic and atraumatic.   Eyes: Conjunctivae and EOM are normal.   Neck: Neck supple.   Cardiovascular: Normal " rate, regular rhythm and normal heart sounds.    Pulmonary/Chest: Effort normal and breath sounds normal.   Abdominal: Soft. Bowel sounds are normal.   Neurological: She is alert and oriented to person, place, and time. No cranial nerve deficit. Gait normal.   Skin: Skin is warm and dry.     Psychiatric: Mood and affect normal.   Musculoskeletal: No tenderness, deformity or edema.      Comments: 28 joint count: 1 swollen and 1 tender; left knee pain and swollen  No MTP tenderness bilaterally  No pain on top of feet  Fullness behind left knee       LABS    Component      Latest Ref Rng & Units 9/26/2020 9/5/2020 9/2/2020   WBC      3.4 - 10.8 x10E3/uL  4.6    RBC      3.77 - 5.28 x10E6/uL  3.78    Hemoglobin      11.1 - 15.9 g/dL  10.8 (L)    Hematocrit      34.0 - 46.6 %  34.2    MCV      79 - 97 fL  91    MCH      26.6 - 33.0 pg  28.6    MCHC      31.5 - 35.7 g/dL  31.6    RDW      11.7 - 15.4 %  15.6 (H)    Platelets      150 - 450 x10E3/uL  285    Neutrophils      Not Estab. %  64    Lymph %      Not Estab. %  24    Mono %      Not Estab. %  8    Eosinophil %      Not Estab. %  4    Basophil %      Not Estab. %  0    Neutrophils Absolute      1.4 - 7.0 x10E3/uL  2.9    Lymph #      0.7 - 3.1 x10E3/uL  1.1    Mono #      0.1 - 0.9 x10E3/uL  0.4    Eos #      0.0 - 0.4 x10E3/uL  0.2    Baso #      0.0 - 0.2 x10E3/uL  0.0    Immature Granulocytes      Not Estab. %  0    Immature Grans (Abs)      0.0 - 0.1 x10E3/uL  0.0    Glucose      65 - 99 mg/dL  96    BUN      6 - 24 mg/dL  19    Creatinine      0.57 - 1.00 mg/dL  0.79    eGFR if non African American      >59 mL/min/1.73  86    eGFR if African American      >59 mL/min/1.73  100    BUN/CREAT RATIO      9 - 23  24 (H)    Sodium      134 - 144 mmol/L  142    Potassium      3.5 - 5.2 mmol/L  4.2    Chloride      96 - 106 mmol/L  105    CO2      20 - 29 mmol/L  20    Calcium      8.7 - 10.2 mg/dL  9.5    PROTEIN TOTAL      6.0 - 8.5 g/dL  6.9    Albumin      3.8 -  4.9 g/dL  3.9    Globulin, Total      1.5 - 4.5 g/dL  3.0    Albumin/Globulin Ratio      1.2 - 2.2  1.3    BILIRUBIN TOTAL      0.0 - 1.2 mg/dL  <0.2    Alkaline Phosphatase      39 - 117 IU/L  56    AST      0 - 40 IU/L  13    ALT      0 - 32 IU/L  11    TIBC      250 - 450 ug/dL 315     UIBC      131 - 425 ug/dL 257     Iron      27 - 159 ug/dL 58     Iron Saturation      15 - 55 % 18     CRP      0 - 10 mg/L  <1    Sed Rate      mm/hr  CANCELED    Sol. Transferrin Receptor      12.2 - 27.3 nmol/L 20.3     Vitamin B-12      232 - 1,245 pg/mL 757     Folate      >3.0 ng/mL 14.9     Ferritin      15 - 150 ng/mL 47     SARS-CoV2 (COVID-19) Qualitative PCR      Not detected   Not detected        Assessment:       1. RA. Failed Humira and Orencia.  She is on Plaquenil, methotrexate and SSZ.  Now feeling well with stiffness in left knee and swelling.  Ultrasound with effusion.  2. 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 Rash and Positive WILMA/SSA/ Matthew.  Previously +DS DNA and normal complements.  3. Alopecia.  Hair growing now.  4. Leg edema bilateral after starting Norvasc  5. Diarrhea with medications.  with noncoated SSZ  6. Obesity  7. Osteoporosis prophylaxis.  8. HTN.  Mildly elevated BP.  Patient to follow to primary doctor  9. Right arm lesion.  Similar lesion treated with a foam by dermatology  10.  Vitamin D deficiency  11.  Back pain  12.  Anemia         Plan:       1.  Labs  2.  Continue MTX 20 mg qweek, SSZ and plaquenil.  Continue to hold prednisone.  If needed in future MTX to 25 mg to see if stiffness improves.     3.  Consider Xeljanz.  Hold on Rituxan for now.    4.  Follow up with Dr. Martines in dermatology  5.  Calcium 1,000 mg daily and vitamin D3 5,000 units daily.    6. Plaquenil exam normal 6/26/2020  7.  Voltaren gel 4 g knee qid  8.  Flu shot     RTO 3 months/prn    Procedure Note   I  have explained the risks, benefits, and alternatives of aspiration of the joint.  The patient voices  understanding and questions have been answered.  The patient agrees to proceed.    The left knee  was prepped with 2% chlorhexidine gluconate and 70% isopropyl alcohol (ChloraPrep).  The area was numbed with topical refrigerant.  A 27 g needle was introduced into the space and no fluid was aspirated.  Kenalog 40 mg and 2 cc lidocaine injected.  Hemostasis.  The patient tolerated the procedure well.    RTO 3 months/prn

## 2020-11-05 DIAGNOSIS — M05.79 RHEUMATOID ARTHRITIS INVOLVING MULTIPLE SITES WITH POSITIVE RHEUMATOID FACTOR: ICD-10-CM

## 2020-11-05 RX ORDER — HYDROXYCHLOROQUINE SULFATE 200 MG/1
200 TABLET, FILM COATED ORAL 2 TIMES DAILY
Qty: 60 TABLET | Refills: 2 | Status: SHIPPED | OUTPATIENT
Start: 2020-11-05 | End: 2021-09-24 | Stop reason: SDUPTHER

## 2020-11-05 RX ORDER — FOLIC ACID 1 MG/1
TABLET ORAL
Qty: 60 TABLET | Refills: 2 | Status: SHIPPED | OUTPATIENT
Start: 2020-11-05 | End: 2021-06-07 | Stop reason: SDUPTHER

## 2020-11-05 RX ORDER — METHOTREXATE 2.5 MG/1
TABLET ORAL
Qty: 40 TABLET | Refills: 2 | Status: SHIPPED | OUTPATIENT
Start: 2020-11-05 | End: 2021-04-19 | Stop reason: SDUPTHER

## 2020-11-11 NOTE — PROGRESS NOTES
Digital Medicine: Health  Follow-Up    The history is provided by the patient.                 Patient needs assistance troubleshooting: patient reminder needed.      Topics Covered on Call: missing readings    Additional Follow-up details: Ms. Ruiz reports that she's doing well. Patient was at work, and didn't have much time to speak.     Patient did acknowledge that she hasn't taken a reading in a while, but she said that she went into 2 office visits on 11/3/20 and had her BP taken. Bp in office was similar to home at 142/84 and 136/66.            Diet-Not assessed          Physical Activity-Not assessed    Medication Adherence-Medication Adherence not addressed.      Substance, Sleep, Stress-Not assessed      Additional monitoring needed. Asking for at least 1 reading a week       Addressed patient questions and patient has my contact information if needed prior to next outreach. Patient verbalizes understanding.      Explained the importance of self-monitoring and medication adherence. Encouraged the patient to communicate with their health  for lifestyle modifications to help improve or maintain a healthy lifestyle.               There are no preventive care reminders to display for this patient.      Last 5 Patient Entered Readings                                      Current 30 Day Average: 135/89     Recent Readings 10/20/2020 10/7/2020 10/6/2020 9/28/2020 9/18/2020    SBP (mmHg) 135 133 134 128 132    DBP (mmHg) 89 81 89 80 82    Pulse 69 65 68 80 64

## 2020-11-15 LAB
ALBUMIN SERPL-MCNC: 4.3 G/DL (ref 3.8–4.9)
ALBUMIN/GLOB SERPL: 1.5 {RATIO} (ref 1.2–2.2)
ALP SERPL-CCNC: 61 IU/L (ref 39–117)
ALT SERPL-CCNC: 10 IU/L (ref 0–32)
AST SERPL-CCNC: 12 IU/L (ref 0–40)
BASOPHILS # BLD AUTO: 0 X10E3/UL (ref 0–0.2)
BASOPHILS NFR BLD AUTO: 1 %
BILIRUB SERPL-MCNC: 0.4 MG/DL (ref 0–1.2)
BUN SERPL-MCNC: 21 MG/DL (ref 6–24)
BUN/CREAT SERPL: 33 (ref 9–23)
CALCIUM SERPL-MCNC: 9.4 MG/DL (ref 8.7–10.2)
CHLORIDE SERPL-SCNC: 100 MMOL/L (ref 96–106)
CHOLEST SERPL-MCNC: 190 MG/DL (ref 100–199)
CO2 SERPL-SCNC: 23 MMOL/L (ref 20–29)
CREAT SERPL-MCNC: 0.64 MG/DL (ref 0.57–1)
CRP SERPL-MCNC: 1 MG/L (ref 0–10)
EOSINOPHIL # BLD AUTO: 0.1 X10E3/UL (ref 0–0.4)
EOSINOPHIL NFR BLD AUTO: 2 %
ERYTHROCYTE [DISTWIDTH] IN BLOOD BY AUTOMATED COUNT: 15.2 % (ref 11.7–15.4)
ERYTHROCYTE [SEDIMENTATION RATE] IN BLOOD BY WESTERGREN METHOD: 23 MM/HR (ref 0–40)
GLOBULIN SER CALC-MCNC: 2.9 G/DL (ref 1.5–4.5)
GLUCOSE SERPL-MCNC: 90 MG/DL (ref 65–99)
HCT VFR BLD AUTO: 36.2 % (ref 34–46.6)
HDLC SERPL-MCNC: 75 MG/DL
HGB BLD-MCNC: 11.4 G/DL (ref 11.1–15.9)
HIV 1+2 AB+HIV1 P24 AG SERPL QL IA: NON REACTIVE
IMM GRANULOCYTES # BLD AUTO: 0 X10E3/UL (ref 0–0.1)
IMM GRANULOCYTES NFR BLD AUTO: 0 %
LDLC SERPL CALC-MCNC: 108 MG/DL (ref 0–99)
LYMPHOCYTES # BLD AUTO: 0.8 X10E3/UL (ref 0.7–3.1)
LYMPHOCYTES NFR BLD AUTO: 18 %
MCH RBC QN AUTO: 28.1 PG (ref 26.6–33)
MCHC RBC AUTO-ENTMCNC: 31.5 G/DL (ref 31.5–35.7)
MCV RBC AUTO: 89 FL (ref 79–97)
MONOCYTES # BLD AUTO: 0.5 X10E3/UL (ref 0.1–0.9)
MONOCYTES NFR BLD AUTO: 11 %
NEUTROPHILS # BLD AUTO: 3 X10E3/UL (ref 1.4–7)
NEUTROPHILS NFR BLD AUTO: 68 %
PLATELET # BLD AUTO: 291 X10E3/UL (ref 150–450)
POTASSIUM SERPL-SCNC: 4.1 MMOL/L (ref 3.5–5.2)
PROT SERPL-MCNC: 7.2 G/DL (ref 6–8.5)
RBC # BLD AUTO: 4.06 X10E6/UL (ref 3.77–5.28)
SODIUM SERPL-SCNC: 136 MMOL/L (ref 134–144)
TRIGL SERPL-MCNC: 33 MG/DL (ref 0–149)
VLDLC SERPL CALC-MCNC: 7 MG/DL (ref 5–40)
WBC # BLD AUTO: 4.4 X10E3/UL (ref 3.4–10.8)

## 2020-11-16 ENCOUNTER — PATIENT MESSAGE (OUTPATIENT)
Dept: RHEUMATOLOGY | Facility: CLINIC | Age: 50
End: 2020-11-16

## 2020-11-23 ENCOUNTER — PATIENT MESSAGE (OUTPATIENT)
Dept: INTERNAL MEDICINE | Facility: CLINIC | Age: 50
End: 2020-11-23

## 2020-11-23 DIAGNOSIS — I10 ESSENTIAL HYPERTENSION: ICD-10-CM

## 2020-11-23 RX ORDER — IRBESARTAN 300 MG/1
300 TABLET ORAL DAILY
Qty: 90 TABLET | Refills: 2 | Status: SHIPPED | OUTPATIENT
Start: 2020-11-23 | End: 2021-08-19

## 2020-12-03 ENCOUNTER — PATIENT MESSAGE (OUTPATIENT)
Dept: RHEUMATOLOGY | Facility: CLINIC | Age: 50
End: 2020-12-03

## 2020-12-03 DIAGNOSIS — M25.562 CHRONIC PAIN OF LEFT KNEE: Primary | ICD-10-CM

## 2020-12-03 DIAGNOSIS — G89.29 CHRONIC PAIN OF LEFT KNEE: Primary | ICD-10-CM

## 2020-12-03 NOTE — PROGRESS NOTES
No readings received in >30 days.   [employer] message sent to patient.     Last 5 Patient Entered Readings                                      Current 30 Day Average:      Recent Readings 10/20/2020 10/7/2020 10/6/2020 9/28/2020 9/18/2020    SBP (mmHg) 135 133 134 128 132    DBP (mmHg) 89 81 89 80 82    Pulse 69 65 68 80 64        Hypertension Medications             amLODIPine (NORVASC) 5 MG tablet Take 1 tablet (5 mg total) by mouth once daily.    hydroCHLOROthiazide (HYDRODIURIL) 25 MG tablet TAKE 1 TABLET(25 MG) BY MOUTH EVERY DAY    irbesartan (AVAPRO) 300 MG tablet Take 1 tablet (300 mg total) by mouth once daily.

## 2020-12-15 ENCOUNTER — PATIENT OUTREACH (OUTPATIENT)
Dept: ADMINISTRATIVE | Facility: OTHER | Age: 50
End: 2020-12-15

## 2020-12-16 NOTE — PROGRESS NOTES
Health Maintenance Due   Topic Date Due    Shingles Vaccine (1 of 2) 05/13/2018    Colorectal Cancer Screening  03/18/2019    Cervical Cancer Screening  08/31/2019    Pneumococcal Vaccine (Highest Risk) (3 of 3 - PPSV23) 12/10/2019     Updates were requested from care everywhere.  Chart was reviewed for overdue Proactive [facility] Encounters (SUMEET) topics (CRS, Breast Cancer Screening, Eye exam)  Health Maintenance has been updated.  LINKS immunization registry triggered.  Immunizations were reconciled.'

## 2020-12-22 ENCOUNTER — OFFICE VISIT (OUTPATIENT)
Dept: ORTHOPEDICS | Facility: CLINIC | Age: 50
End: 2020-12-22
Payer: COMMERCIAL

## 2020-12-22 ENCOUNTER — HOSPITAL ENCOUNTER (OUTPATIENT)
Dept: RADIOLOGY | Facility: HOSPITAL | Age: 50
Discharge: HOME | End: 2020-12-22
Attending: ORTHOPAEDIC SURGERY
Payer: COMMERCIAL

## 2020-12-22 VITALS — BODY MASS INDEX: 42.15 KG/M2 | HEIGHT: 63 IN | WEIGHT: 237.88 LBS

## 2020-12-22 DIAGNOSIS — M25.562 PAIN IN BOTH KNEES, UNSPECIFIED CHRONICITY: ICD-10-CM

## 2020-12-22 DIAGNOSIS — M25.562 PAIN IN BOTH KNEES, UNSPECIFIED CHRONICITY: Primary | ICD-10-CM

## 2020-12-22 DIAGNOSIS — M25.562 CHRONIC PAIN OF LEFT KNEE: ICD-10-CM

## 2020-12-22 DIAGNOSIS — M25.561 PAIN IN BOTH KNEES, UNSPECIFIED CHRONICITY: Primary | ICD-10-CM

## 2020-12-22 DIAGNOSIS — M05.79 RHEUMATOID ARTHRITIS INVOLVING MULTIPLE SITES WITH POSITIVE RHEUMATOID FACTOR: ICD-10-CM

## 2020-12-22 DIAGNOSIS — M17.4 OTHER SECONDARY OSTEOARTHRITIS OF BOTH KNEES: Primary | ICD-10-CM

## 2020-12-22 DIAGNOSIS — G89.29 CHRONIC PAIN OF LEFT KNEE: ICD-10-CM

## 2020-12-22 DIAGNOSIS — M25.561 PAIN IN BOTH KNEES, UNSPECIFIED CHRONICITY: ICD-10-CM

## 2020-12-22 PROBLEM — M17.0 OSTEOARTHRITIS OF BOTH KNEES: Status: ACTIVE | Noted: 2020-12-22

## 2020-12-22 PROCEDURE — 3008F BODY MASS INDEX DOCD: CPT | Mod: CPTII,S$GLB,, | Performed by: ORTHOPAEDIC SURGERY

## 2020-12-22 PROCEDURE — 1126F PR PAIN SEVERITY QUANTIFIED, NO PAIN PRESENT: ICD-10-PCS | Mod: S$GLB,,, | Performed by: ORTHOPAEDIC SURGERY

## 2020-12-22 PROCEDURE — 1126F AMNT PAIN NOTED NONE PRSNT: CPT | Mod: S$GLB,,, | Performed by: ORTHOPAEDIC SURGERY

## 2020-12-22 PROCEDURE — 99203 PR OFFICE/OUTPT VISIT, NEW, LEVL III, 30-44 MIN: ICD-10-PCS | Mod: S$GLB,,, | Performed by: ORTHOPAEDIC SURGERY

## 2020-12-22 PROCEDURE — 73562 X-RAY EXAM OF KNEE 3: CPT | Mod: TC,50

## 2020-12-22 PROCEDURE — 99999 PR PBB SHADOW E&M-EST. PATIENT-LVL III: CPT | Mod: PBBFAC,,, | Performed by: ORTHOPAEDIC SURGERY

## 2020-12-22 PROCEDURE — 3008F PR BODY MASS INDEX (BMI) DOCUMENTED: ICD-10-PCS | Mod: CPTII,S$GLB,, | Performed by: ORTHOPAEDIC SURGERY

## 2020-12-22 PROCEDURE — 73562 XR KNEE ORTHO BILAT: ICD-10-PCS | Mod: 26,,, | Performed by: RADIOLOGY

## 2020-12-22 PROCEDURE — 99203 OFFICE O/P NEW LOW 30 MIN: CPT | Mod: S$GLB,,, | Performed by: ORTHOPAEDIC SURGERY

## 2020-12-22 PROCEDURE — 99999 PR PBB SHADOW E&M-EST. PATIENT-LVL III: ICD-10-PCS | Mod: PBBFAC,,, | Performed by: ORTHOPAEDIC SURGERY

## 2020-12-22 PROCEDURE — 73562 X-RAY EXAM OF KNEE 3: CPT | Mod: 26,,, | Performed by: RADIOLOGY

## 2020-12-22 NOTE — LETTER
December 22, 2020      Rosamaria Nicholson MD  [address]  [facility] [address]           [facility] - Orthopedics 5th Fl  [address], [address]  [facility] [address]  Phone: [phone]          Patient: Antonio Ruiz   MR Number: [id number]   YOB: 1970   Date of Visit: 12/22/2020       Dear Dr. Rosamaria Nicholson:    Thank you for referring Antonio Ruiz to me for evaluation. Attached you will find relevant portions of my assessment and plan of care.    If you have questions, please do not hesitate to call me. I look forward to following Antonio Ruiz along with you.    Sincerely,    Jacob Benjamin MD    Enclosure  CC:  No Recipients    If you would like to receive this communication electronically, please contact [email or web address] or [phone] to request more information on [employer] access.    For providers and/or their staff who would like to refer a patient to [facility], please contact us through our one-stop-shop provider referral line, [facility], [place], at [phone].    If you feel you have received this communication in error or would no longer like to receive these types of communications, please e-mail [email or web address]

## 2020-12-22 NOTE — PROGRESS NOTES
Subjective:       Patient ID: Antonio Ruiz is a 50 y.o. female.    Chief Complaint:   Pain of the Left Knee   She comes in today for pain in the left greater than right knees.  Her right knee started hurting more this week, and has occasional jolt of pain with weight-bearing activity.  The left knee hurts more days than not over the past month.  It 1st started hurting behind the knee, feeling tight.  She occasionally gets a sharp pain in the left knee that stops her in her tracks.  She has 15 year history of rheumatoid arthritis and on Plaquenil, methotrexate, sulfasalazine.  She used to take prednisone every day, but she has stopped doing that.  She has no groin pain, distal numbness or tingling.  No fall, accident, injury, history of pertinent surgery.    Past Medical History:   Diagnosis Date    Allergy     Anemia 12/7/2012    Arthritis     rheumatoid    Hypertension      Past Surgical History:   Procedure Laterality Date    BREAST CYST EXCISION Left     age 14    BREAST SURGERY       left breast lump removal 14 years old    FRACTURE SURGERY  2012, may 14    right ankle     Family History   Problem Relation Age of Onset    Breast cancer Mother 35    Lupus Cousin     Osteoarthritis Neg Hx     Psoriasis Neg Hx     Rheum arthritis Neg Hx     Eczema Neg Hx      Social History     Socioeconomic History    Marital status:      Spouse name: Not on file    Number of children: Not on file    Years of education: Not on file    Highest education level: Not on file   Occupational History    Not on file   Social Needs    Financial resource strain: Not on file    Food insecurity     Worry: Not on file     Inability: Not on file    Transportation needs     Medical: Not on file     Non-medical: Not on file   Tobacco Use    Smoking status: Never Smoker    Smokeless tobacco: Never Used   Substance and Sexual Activity    Alcohol use: Yes     Comment: occasionally, aware of risks/benefits     Drug use: No    Sexual activity: Not on file   Lifestyle    Physical activity     Days per week: Not on file     Minutes per session: Not on file    Stress: Not on file   Relationships    Social connections     Talks on phone: Not on file     Gets together: Not on file     Attends Christian service: Not on file     Active member of club or organization: Not on file     Attends meetings of clubs or organizations: Not on file     Relationship status: Not on file   Other Topics Concern    Are you pregnant or think you may be? Not Asked    Breast-feeding Not Asked   Social History Narrative    Not on file       Current Outpatient Medications   Medication Sig Dispense Refill    amLODIPine (NORVASC) 5 MG tablet Take 1 tablet (5 mg total) by mouth once daily. 90 tablet 2    diclofenac sodium (VOLTAREN) 1 % Gel Apply 4 g topically 4 (four) times daily. 5 Tube 2    flu vac [id number] 36mos up,PF, 60 mcg (15 mcg x 4)/0.5 mL Syrg 0.5 mLs. 0.5 mL 0    folic acid (FOLVITE) 1 MG tablet TAKE 2 TABLETS BY MOUTH EVERY DAY 60 tablet 2    hydroCHLOROthiazide (HYDRODIURIL) 25 MG tablet TAKE 1 TABLET(25 MG) BY MOUTH EVERY DAY 90 tablet 2    hydrOXYchloroQUINE (PLAQUENIL) 200 mg tablet Take 1 tablet (200 mg total) by mouth 2 (two) times daily. 60 tablet 2    ibuprofen (ADVIL,MOTRIN) 800 MG tablet Take 1 tablet (800 mg total) by mouth every 8 (eight) hours as needed for Pain. 90 tablet 0    irbesartan (AVAPRO) 300 MG tablet Take 1 tablet (300 mg total) by mouth once daily. 90 tablet 2    methotrexate 2.5 MG Tab TAKE 10 TABLETS BY MOUTH EVERY 7 DAYS 40 tablet 2    predniSONE (DELTASONE) 10 MG tablet Take 1 tablet (10 mg total) by mouth once daily. Take for rheumatoid arthritis flare and if no improvement in 7 days contact the office. 30 tablet 0    sulfaSALAzine (AZULFIDINE) 500 MG EC tablet TAKE 3 TABLETS BY MOUTH TWICE DAILY AS DIRECTED 540 tablet 0    sulfaSALAzine (AZULFIDINE) 500 mg Tab Take 3 tablets (1,500 mg  "total) by mouth 2 (two) times daily. 540 tablet 0     No current facility-administered medications for this visit.      Review of patient's allergies indicates:   Allergen Reactions    Humira [adalimumab] Rash     Humira caused drug-induced lupus       ROS:  A review of systems is updated and there are no reported vision changes, ear/nose/mouth/throat complaints,  chest pain, shortness of breath, abdominal pain, urological complaints, fevers or chills, psychiatric complaints. Musculoskeletal and neurologcial symptoms are as documented. All other systems are negative.    Objective:      Vitals:    12/22/20 1107   Weight: 107.9 kg (237 lb 14 oz)   Height: 5' 3" (1.6 m)     Physical Exam  There is no significant deformity, and a normal gait.  Active range of motion is 0-120 degrees.  Anterior crepitus with active extension.  Patellar mobility is limited.  Boggy synovitis without effusion.  Medial joint line tenderness predominates.  No instability to varus/valgus/Lachman's stressing.  No pain in the groin with flexion and internal rotation of the hip which is not limited.  Skin intact.  Distal neurovascular intact.  Flip test negative.    Imaging Review:   Weightbearing x-rays done today show moderate varus gonarthrosis bilaterally with medial osteophytes.  Assessment:   Secondary DJD, knees  Plan:       She does not have enough pain and disability to warrant surgical intervention currently, and she did get some relief from left knee corticosteroid injection.  We talked about multiple medication options for her, but ultimately decided just modify the way she is taking what she is on now by out using the ibuprofen somewhat more often, possibly also considering some Tylenol.  She may see us again in about 6 weeks to have bilateral knee injection.  She will let us know if she needs further treatment    The patient's pathophysiology was explained in detail with reference to x-rays, models, other visual aids as " appropriate.  Treatment options were discussed in detail.  Questions were invited and answered to the patient's satisfaction.

## 2020-12-23 ENCOUNTER — PATIENT OUTREACH (OUTPATIENT)
Dept: OTHER | Facility: OTHER | Age: 50
End: 2020-12-23

## 2021-01-05 ENCOUNTER — PATIENT MESSAGE (OUTPATIENT)
Dept: ADMINISTRATIVE | Facility: HOSPITAL | Age: 51
End: 2021-01-05

## 2021-01-06 DIAGNOSIS — M05.79 RHEUMATOID ARTHRITIS INVOLVING MULTIPLE SITES WITH POSITIVE RHEUMATOID FACTOR: ICD-10-CM

## 2021-01-06 DIAGNOSIS — E55.9 VITAMIN D DEFICIENCY DISEASE: ICD-10-CM

## 2021-01-06 DIAGNOSIS — R53.83 FATIGUE, UNSPECIFIED TYPE: ICD-10-CM

## 2021-01-06 DIAGNOSIS — D84.9 IMMUNOSUPPRESSION: Primary | ICD-10-CM

## 2021-01-06 RX ORDER — SULFASALAZINE 500 MG/1
TABLET, DELAYED RELEASE ORAL
Qty: 540 TABLET | Refills: 0 | Status: SHIPPED | OUTPATIENT
Start: 2021-01-06 | End: 2021-05-12 | Stop reason: SDUPTHER

## 2021-01-30 ENCOUNTER — HOSPITAL ENCOUNTER (EMERGENCY)
Facility: HOSPITAL | Age: 51
Discharge: HOME | End: 2021-01-30
Attending: EMERGENCY MEDICINE
Payer: COMMERCIAL

## 2021-01-30 VITALS
DIASTOLIC BLOOD PRESSURE: 82 MMHG | HEART RATE: 71 BPM | TEMPERATURE: 98 F | HEIGHT: 63 IN | WEIGHT: 235 LBS | SYSTOLIC BLOOD PRESSURE: 155 MMHG | RESPIRATION RATE: 17 BRPM | OXYGEN SATURATION: 100 % | BODY MASS INDEX: 41.64 KG/M2

## 2021-01-30 DIAGNOSIS — M17.12 OSTEOARTHRITIS OF LEFT KNEE, UNSPECIFIED OSTEOARTHRITIS TYPE: Primary | ICD-10-CM

## 2021-01-30 DIAGNOSIS — M25.562 CHRONIC PAIN OF LEFT KNEE: ICD-10-CM

## 2021-01-30 DIAGNOSIS — G89.29 CHRONIC PAIN OF LEFT KNEE: ICD-10-CM

## 2021-01-30 PROCEDURE — 25000003 PHARM REV CODE 250: Mod: ER | Performed by: EMERGENCY MEDICINE

## 2021-01-30 PROCEDURE — 96372 THER/PROPH/DIAG INJ SC/IM: CPT | Mod: ER

## 2021-01-30 PROCEDURE — 99284 EMERGENCY DEPT VISIT MOD MDM: CPT | Mod: 25,ER

## 2021-01-30 PROCEDURE — 63600175 PHARM REV CODE 636 W HCPCS: Mod: ER | Performed by: EMERGENCY MEDICINE

## 2021-01-30 RX ORDER — IBUPROFEN 600 MG/1
600 TABLET ORAL EVERY 6 HOURS PRN
Qty: 20 TABLET | Refills: 0 | Status: SHIPPED | OUTPATIENT
Start: 2021-01-30 | End: 2021-12-23 | Stop reason: SDUPTHER

## 2021-01-30 RX ORDER — CYCLOBENZAPRINE HCL 10 MG
10 TABLET ORAL
Status: COMPLETED | OUTPATIENT
Start: 2021-01-30 | End: 2021-01-30

## 2021-01-30 RX ORDER — KETOROLAC TROMETHAMINE 30 MG/ML
30 INJECTION, SOLUTION INTRAMUSCULAR; INTRAVENOUS
Status: COMPLETED | OUTPATIENT
Start: 2021-01-30 | End: 2021-01-30

## 2021-01-30 RX ORDER — DICLOFENAC SODIUM 10 MG/G
2 GEL TOPICAL 4 TIMES DAILY PRN
Qty: 1 TUBE | Refills: 0 | Status: SHIPPED | OUTPATIENT
Start: 2021-01-30 | End: 2022-01-19

## 2021-01-30 RX ORDER — ACETAMINOPHEN 500 MG
1000 TABLET ORAL EVERY 6 HOURS PRN
Qty: 30 TABLET | Refills: 0 | Status: SHIPPED | OUTPATIENT
Start: 2021-01-30

## 2021-01-30 RX ORDER — CYCLOBENZAPRINE HCL 10 MG
10 TABLET ORAL 3 TIMES DAILY PRN
Qty: 15 TABLET | Refills: 0 | Status: SHIPPED | OUTPATIENT
Start: 2021-01-30 | End: 2021-02-04

## 2021-01-30 RX ORDER — DEXAMETHASONE SODIUM PHOSPHATE 4 MG/ML
4 INJECTION, SOLUTION INTRA-ARTICULAR; INTRALESIONAL; INTRAMUSCULAR; INTRAVENOUS; SOFT TISSUE
Status: COMPLETED | OUTPATIENT
Start: 2021-01-30 | End: 2021-01-30

## 2021-01-30 RX ADMIN — KETOROLAC TROMETHAMINE 30 MG: 30 INJECTION, SOLUTION INTRAMUSCULAR at 12:01

## 2021-01-30 RX ADMIN — CYCLOBENZAPRINE 10 MG: 10 TABLET, FILM COATED ORAL at 12:01

## 2021-01-30 RX ADMIN — DEXAMETHASONE SODIUM PHOSPHATE 4 MG: 4 INJECTION INTRA-ARTICULAR; INTRALESIONAL; INTRAMUSCULAR; INTRAVENOUS; SOFT TISSUE at 12:01

## 2021-02-07 ENCOUNTER — PATIENT OUTREACH (OUTPATIENT)
Dept: ADMINISTRATIVE | Facility: OTHER | Age: 51
End: 2021-02-07

## 2021-02-08 ENCOUNTER — OFFICE VISIT (OUTPATIENT)
Dept: RHEUMATOLOGY | Facility: CLINIC | Age: 51
End: 2021-02-08
Payer: COMMERCIAL

## 2021-02-08 VITALS
BODY MASS INDEX: 43.4 KG/M2 | WEIGHT: 244.94 LBS | SYSTOLIC BLOOD PRESSURE: 153 MMHG | HEART RATE: 64 BPM | HEIGHT: 63 IN | TEMPERATURE: 99 F | DIASTOLIC BLOOD PRESSURE: 98 MMHG

## 2021-02-08 DIAGNOSIS — M05.79 RHEUMATOID ARTHRITIS INVOLVING MULTIPLE SITES WITH POSITIVE RHEUMATOID FACTOR: Primary | ICD-10-CM

## 2021-02-08 DIAGNOSIS — M17.4 OTHER SECONDARY OSTEOARTHRITIS OF BOTH KNEES: ICD-10-CM

## 2021-02-08 DIAGNOSIS — G89.29 CHRONIC PAIN OF LEFT KNEE: ICD-10-CM

## 2021-02-08 DIAGNOSIS — E66.01 CLASS 3 SEVERE OBESITY WITH SERIOUS COMORBIDITY AND BODY MASS INDEX (BMI) OF 40.0 TO 44.9 IN ADULT, UNSPECIFIED OBESITY TYPE: ICD-10-CM

## 2021-02-08 DIAGNOSIS — D84.9 IMMUNOSUPPRESSION: ICD-10-CM

## 2021-02-08 DIAGNOSIS — M25.562 CHRONIC PAIN OF LEFT KNEE: ICD-10-CM

## 2021-02-08 DIAGNOSIS — E55.9 VITAMIN D DEFICIENCY DISEASE: ICD-10-CM

## 2021-02-08 PROCEDURE — 3080F PR MOST RECENT DIASTOLIC BLOOD PRESSURE >= 90 MM HG: ICD-10-PCS | Mod: CPTII,S$GLB,, | Performed by: INTERNAL MEDICINE

## 2021-02-08 PROCEDURE — 99214 PR OFFICE/OUTPT VISIT, EST, LEVL IV, 30-39 MIN: ICD-10-PCS | Mod: 25,S$GLB,, | Performed by: INTERNAL MEDICINE

## 2021-02-08 PROCEDURE — 3008F BODY MASS INDEX DOCD: CPT | Mod: CPTII,S$GLB,, | Performed by: INTERNAL MEDICINE

## 2021-02-08 PROCEDURE — 3077F PR MOST RECENT SYSTOLIC BLOOD PRESSURE >= 140 MM HG: ICD-10-PCS | Mod: CPTII,S$GLB,, | Performed by: INTERNAL MEDICINE

## 2021-02-08 PROCEDURE — 3008F PR BODY MASS INDEX (BMI) DOCUMENTED: ICD-10-PCS | Mod: CPTII,S$GLB,, | Performed by: INTERNAL MEDICINE

## 2021-02-08 PROCEDURE — 99999 PR PBB SHADOW E&M-EST. PATIENT-LVL III: ICD-10-PCS | Mod: PBBFAC,,, | Performed by: INTERNAL MEDICINE

## 2021-02-08 PROCEDURE — 99999 PR PBB SHADOW E&M-EST. PATIENT-LVL III: CPT | Mod: PBBFAC,,, | Performed by: INTERNAL MEDICINE

## 2021-02-08 PROCEDURE — 20610 DRAIN/INJ JOINT/BURSA W/O US: CPT | Mod: LT,S$GLB,, | Performed by: INTERNAL MEDICINE

## 2021-02-08 PROCEDURE — 20610 PR DRAIN/INJECT LARGE JOINT/BURSA: ICD-10-PCS | Mod: LT,S$GLB,, | Performed by: INTERNAL MEDICINE

## 2021-02-08 PROCEDURE — 1126F AMNT PAIN NOTED NONE PRSNT: CPT | Mod: S$GLB,,, | Performed by: INTERNAL MEDICINE

## 2021-02-08 PROCEDURE — 3077F SYST BP >= 140 MM HG: CPT | Mod: CPTII,S$GLB,, | Performed by: INTERNAL MEDICINE

## 2021-02-08 PROCEDURE — 1126F PR PAIN SEVERITY QUANTIFIED, NO PAIN PRESENT: ICD-10-PCS | Mod: S$GLB,,, | Performed by: INTERNAL MEDICINE

## 2021-02-08 PROCEDURE — 99214 OFFICE O/P EST MOD 30 MIN: CPT | Mod: 25,S$GLB,, | Performed by: INTERNAL MEDICINE

## 2021-02-08 PROCEDURE — 3080F DIAST BP >= 90 MM HG: CPT | Mod: CPTII,S$GLB,, | Performed by: INTERNAL MEDICINE

## 2021-02-08 RX ORDER — TRIAMCINOLONE ACETONIDE 40 MG/ML
40 INJECTION, SUSPENSION INTRA-ARTICULAR; INTRAMUSCULAR ONCE
Status: COMPLETED | OUTPATIENT
Start: 2021-02-08 | End: 2021-02-08

## 2021-02-08 RX ADMIN — TRIAMCINOLONE ACETONIDE 40 MG: 40 INJECTION, SUSPENSION INTRA-ARTICULAR; INTRAMUSCULAR at 09:02

## 2021-02-08 ASSESSMENT — ROUTINE ASSESSMENT OF PATIENT INDEX DATA (RAPID3)
FATIGUE SCORE: 0
AM STIFFNESS SCORE: 0, NO
PAIN SCORE: 3.5
TOTAL RAPID3 SCORE: 2.94
PATIENT GLOBAL ASSESSMENT SCORE: 2
PSYCHOLOGICAL DISTRESS SCORE: 0
MDHAQ FUNCTION SCORE: 1

## 2021-02-14 LAB
25(OH)D3+25(OH)D2 SERPL-MCNC: 31.2 NG/ML (ref 30–100)
ALBUMIN SERPL-MCNC: 4.2 G/DL (ref 3.8–4.9)
ALBUMIN/GLOB SERPL: 1.4 {RATIO} (ref 1.2–2.2)
ALP SERPL-CCNC: 63 IU/L (ref 39–117)
ALT SERPL-CCNC: 12 IU/L (ref 0–32)
AST SERPL-CCNC: 14 IU/L (ref 0–40)
BASOPHILS # BLD AUTO: 0 X10E3/UL (ref 0–0.2)
BASOPHILS NFR BLD AUTO: 0 %
BILIRUB SERPL-MCNC: 0.3 MG/DL (ref 0–1.2)
BUN SERPL-MCNC: 15 MG/DL (ref 6–24)
BUN/CREAT SERPL: 24 (ref 9–23)
CALCIUM SERPL-MCNC: 9.8 MG/DL (ref 8.7–10.2)
CHLORIDE SERPL-SCNC: 101 MMOL/L (ref 96–106)
CO2 SERPL-SCNC: 25 MMOL/L (ref 20–29)
CREAT SERPL-MCNC: 0.63 MG/DL (ref 0.57–1)
CRP SERPL-MCNC: 1 MG/L (ref 0–10)
EOSINOPHIL # BLD AUTO: 0.1 X10E3/UL (ref 0–0.4)
EOSINOPHIL NFR BLD AUTO: 2 %
ERYTHROCYTE [DISTWIDTH] IN BLOOD BY AUTOMATED COUNT: 14.5 % (ref 11.7–15.4)
ERYTHROCYTE [SEDIMENTATION RATE] IN BLOOD BY WESTERGREN METHOD: 20 MM/HR (ref 0–40)
GLOBULIN SER CALC-MCNC: 3.1 G/DL (ref 1.5–4.5)
GLUCOSE SERPL-MCNC: 88 MG/DL (ref 65–99)
HCT VFR BLD AUTO: 37 % (ref 34–46.6)
HGB BLD-MCNC: 11.6 G/DL (ref 11.1–15.9)
IMM GRANULOCYTES # BLD AUTO: 0.1 X10E3/UL (ref 0–0.1)
IMM GRANULOCYTES NFR BLD AUTO: 1 %
LYMPHOCYTES # BLD AUTO: 0.9 X10E3/UL (ref 0.7–3.1)
LYMPHOCYTES NFR BLD AUTO: 15 %
MCH RBC QN AUTO: 28.8 PG (ref 26.6–33)
MCHC RBC AUTO-ENTMCNC: 31.4 G/DL (ref 31.5–35.7)
MCV RBC AUTO: 92 FL (ref 79–97)
MONOCYTES # BLD AUTO: 0.6 X10E3/UL (ref 0.1–0.9)
MONOCYTES NFR BLD AUTO: 9 %
NEUTROPHILS # BLD AUTO: 4.4 X10E3/UL (ref 1.4–7)
NEUTROPHILS NFR BLD AUTO: 73 %
PLATELET # BLD AUTO: 304 X10E3/UL (ref 150–450)
POTASSIUM SERPL-SCNC: 4.3 MMOL/L (ref 3.5–5.2)
PROT SERPL-MCNC: 7.3 G/DL (ref 6–8.5)
RBC # BLD AUTO: 4.03 X10E6/UL (ref 3.77–5.28)
SODIUM SERPL-SCNC: 139 MMOL/L (ref 134–144)
WBC # BLD AUTO: 6 X10E3/UL (ref 3.4–10.8)

## 2021-02-23 ENCOUNTER — PATIENT MESSAGE (OUTPATIENT)
Dept: RHEUMATOLOGY | Facility: CLINIC | Age: 51
End: 2021-02-23

## 2021-04-05 ENCOUNTER — PATIENT MESSAGE (OUTPATIENT)
Dept: ADMINISTRATIVE | Facility: HOSPITAL | Age: 51
End: 2021-04-05

## 2021-04-07 ENCOUNTER — TELEPHONE (OUTPATIENT)
Dept: INTERNAL MEDICINE | Facility: CLINIC | Age: 51
End: 2021-04-07

## 2021-04-07 ENCOUNTER — PATIENT OUTREACH (OUTPATIENT)
Dept: ADMINISTRATIVE | Facility: HOSPITAL | Age: 51
End: 2021-04-07

## 2021-04-07 ENCOUNTER — PATIENT MESSAGE (OUTPATIENT)
Dept: INTERNAL MEDICINE | Facility: CLINIC | Age: 51
End: 2021-04-07

## 2021-04-19 DIAGNOSIS — M05.79 RHEUMATOID ARTHRITIS INVOLVING MULTIPLE SITES WITH POSITIVE RHEUMATOID FACTOR: ICD-10-CM

## 2021-04-20 RX ORDER — METHOTREXATE 2.5 MG/1
TABLET ORAL
Qty: 40 TABLET | Refills: 2 | Status: SHIPPED | OUTPATIENT
Start: 2021-04-20 | End: 2021-07-19 | Stop reason: SDUPTHER

## 2021-04-28 ENCOUNTER — PATIENT MESSAGE (OUTPATIENT)
Dept: INTERNAL MEDICINE | Facility: CLINIC | Age: 51
End: 2021-04-28

## 2021-05-06 ENCOUNTER — PATIENT OUTREACH (OUTPATIENT)
Dept: ADMINISTRATIVE | Facility: OTHER | Age: 51
End: 2021-05-06

## 2021-05-10 ENCOUNTER — OFFICE VISIT (OUTPATIENT)
Dept: RHEUMATOLOGY | Facility: CLINIC | Age: 51
End: 2021-05-10
Payer: COMMERCIAL

## 2021-05-10 VITALS
BODY MASS INDEX: 41.33 KG/M2 | SYSTOLIC BLOOD PRESSURE: 116 MMHG | DIASTOLIC BLOOD PRESSURE: 74 MMHG | WEIGHT: 242.06 LBS | HEIGHT: 64 IN | HEART RATE: 70 BPM

## 2021-05-10 DIAGNOSIS — D84.9 IMMUNOSUPPRESSION: ICD-10-CM

## 2021-05-10 DIAGNOSIS — M25.562 CHRONIC PAIN OF LEFT KNEE: ICD-10-CM

## 2021-05-10 DIAGNOSIS — G89.29 CHRONIC PAIN OF LEFT KNEE: ICD-10-CM

## 2021-05-10 DIAGNOSIS — M05.79 RHEUMATOID ARTHRITIS INVOLVING MULTIPLE SITES WITH POSITIVE RHEUMATOID FACTOR: Primary | ICD-10-CM

## 2021-05-10 PROCEDURE — 99214 PR OFFICE/OUTPT VISIT, EST, LEVL IV, 30-39 MIN: ICD-10-PCS | Mod: 25,S$GLB,, | Performed by: INTERNAL MEDICINE

## 2021-05-10 PROCEDURE — 99999 PR PBB SHADOW E&M-EST. PATIENT-LVL III: ICD-10-PCS | Mod: PBBFAC,,, | Performed by: INTERNAL MEDICINE

## 2021-05-10 PROCEDURE — 3008F BODY MASS INDEX DOCD: CPT | Mod: CPTII,S$GLB,, | Performed by: INTERNAL MEDICINE

## 2021-05-10 PROCEDURE — 20610 DRAIN/INJ JOINT/BURSA W/O US: CPT | Mod: LT,S$GLB,, | Performed by: INTERNAL MEDICINE

## 2021-05-10 PROCEDURE — 20610 PR DRAIN/INJECT LARGE JOINT/BURSA: ICD-10-PCS | Mod: LT,S$GLB,, | Performed by: INTERNAL MEDICINE

## 2021-05-10 PROCEDURE — 99214 OFFICE O/P EST MOD 30 MIN: CPT | Mod: 25,S$GLB,, | Performed by: INTERNAL MEDICINE

## 2021-05-10 PROCEDURE — 1125F AMNT PAIN NOTED PAIN PRSNT: CPT | Mod: S$GLB,,, | Performed by: INTERNAL MEDICINE

## 2021-05-10 PROCEDURE — 3008F PR BODY MASS INDEX (BMI) DOCUMENTED: ICD-10-PCS | Mod: CPTII,S$GLB,, | Performed by: INTERNAL MEDICINE

## 2021-05-10 PROCEDURE — 99999 PR PBB SHADOW E&M-EST. PATIENT-LVL III: CPT | Mod: PBBFAC,,, | Performed by: INTERNAL MEDICINE

## 2021-05-10 PROCEDURE — 1125F PR PAIN SEVERITY QUANTIFIED, PAIN PRESENT: ICD-10-PCS | Mod: S$GLB,,, | Performed by: INTERNAL MEDICINE

## 2021-05-10 RX ORDER — CHOLECALCIFEROL (VITAMIN D3) 25 MCG
1000 TABLET ORAL DAILY
COMMUNITY

## 2021-05-10 RX ORDER — TRIAMCINOLONE ACETONIDE 40 MG/ML
40 INJECTION, SUSPENSION INTRA-ARTICULAR; INTRAMUSCULAR ONCE
Status: COMPLETED | OUTPATIENT
Start: 2021-05-10 | End: 2021-05-10

## 2021-05-10 RX ADMIN — TRIAMCINOLONE ACETONIDE 40 MG: 40 INJECTION, SUSPENSION INTRA-ARTICULAR; INTRAMUSCULAR at 11:05

## 2021-05-12 DIAGNOSIS — M05.79 RHEUMATOID ARTHRITIS INVOLVING MULTIPLE SITES WITH POSITIVE RHEUMATOID FACTOR: ICD-10-CM

## 2021-05-13 RX ORDER — SULFASALAZINE 500 MG/1
1500 TABLET, DELAYED RELEASE ORAL 2 TIMES DAILY
Qty: 540 TABLET | Refills: 0 | Status: SHIPPED | OUTPATIENT
Start: 2021-05-13 | End: 2021-09-13

## 2021-05-16 ENCOUNTER — PATIENT MESSAGE (OUTPATIENT)
Dept: RHEUMATOLOGY | Facility: CLINIC | Age: 51
End: 2021-05-16

## 2021-05-17 ENCOUNTER — PATIENT MESSAGE (OUTPATIENT)
Dept: RHEUMATOLOGY | Facility: CLINIC | Age: 51
End: 2021-05-17

## 2021-05-17 LAB
ALBUMIN SERPL-MCNC: 4.3 G/DL (ref 3.8–4.9)
ALBUMIN/GLOB SERPL: 1.4 {RATIO} (ref 1.2–2.2)
ALP SERPL-CCNC: 61 IU/L (ref 39–117)
ALT SERPL-CCNC: 12 IU/L (ref 0–32)
AST SERPL-CCNC: 10 IU/L (ref 0–40)
BASOPHILS # BLD AUTO: 0 X10E3/UL (ref 0–0.2)
BASOPHILS NFR BLD AUTO: 0 %
BILIRUB SERPL-MCNC: 0.3 MG/DL (ref 0–1.2)
BUN SERPL-MCNC: 15 MG/DL (ref 6–24)
BUN/CREAT SERPL: 25 (ref 9–23)
CALCIUM SERPL-MCNC: 9.4 MG/DL (ref 8.7–10.2)
CHLORIDE SERPL-SCNC: 103 MMOL/L (ref 96–106)
CO2 SERPL-SCNC: 23 MMOL/L (ref 20–29)
CREAT SERPL-MCNC: 0.61 MG/DL (ref 0.57–1)
CRP SERPL-MCNC: 1 MG/L (ref 0–10)
EOSINOPHIL # BLD AUTO: 0.1 X10E3/UL (ref 0–0.4)
EOSINOPHIL NFR BLD AUTO: 1 %
ERYTHROCYTE [DISTWIDTH] IN BLOOD BY AUTOMATED COUNT: 14.1 % (ref 11.7–15.4)
ERYTHROCYTE [SEDIMENTATION RATE] IN BLOOD BY WESTERGREN METHOD: 15 MM/HR (ref 0–40)
GLOBULIN SER CALC-MCNC: 3 G/DL (ref 1.5–4.5)
GLUCOSE SERPL-MCNC: 94 MG/DL (ref 65–99)
HCT VFR BLD AUTO: 35.1 % (ref 34–46.6)
HGB BLD-MCNC: 11.6 G/DL (ref 11.1–15.9)
IMM GRANULOCYTES # BLD AUTO: 0 X10E3/UL (ref 0–0.1)
IMM GRANULOCYTES NFR BLD AUTO: 0 %
LYMPHOCYTES # BLD AUTO: 1 X10E3/UL (ref 0.7–3.1)
LYMPHOCYTES NFR BLD AUTO: 16 %
MCH RBC QN AUTO: 29.8 PG (ref 26.6–33)
MCHC RBC AUTO-ENTMCNC: 33 G/DL (ref 31.5–35.7)
MCV RBC AUTO: 90 FL (ref 79–97)
MONOCYTES # BLD AUTO: 0.4 X10E3/UL (ref 0.1–0.9)
MONOCYTES NFR BLD AUTO: 6 %
NEUTROPHILS # BLD AUTO: 4.6 X10E3/UL (ref 1.4–7)
NEUTROPHILS NFR BLD AUTO: 77 %
PLATELET # BLD AUTO: 324 X10E3/UL (ref 150–450)
POTASSIUM SERPL-SCNC: 3.8 MMOL/L (ref 3.5–5.2)
PROT SERPL-MCNC: 7.3 G/DL (ref 6–8.5)
RBC # BLD AUTO: 3.89 X10E6/UL (ref 3.77–5.28)
SODIUM SERPL-SCNC: 139 MMOL/L (ref 134–144)
WBC # BLD AUTO: 6.1 X10E3/UL (ref 3.4–10.8)

## 2021-06-01 ENCOUNTER — LAB VISIT (OUTPATIENT)
Dept: LAB | Facility: HOSPITAL | Age: 51
End: 2021-06-01
Attending: INTERNAL MEDICINE
Payer: COMMERCIAL

## 2021-06-01 DIAGNOSIS — Z12.11 ENCOUNTER FOR FIT (FECAL IMMUNOCHEMICAL TEST) SCREENING: ICD-10-CM

## 2021-06-01 PROCEDURE — 82274 ASSAY TEST FOR BLOOD FECAL: CPT | Performed by: INTERNAL MEDICINE

## 2021-06-02 ENCOUNTER — PATIENT MESSAGE (OUTPATIENT)
Dept: RHEUMATOLOGY | Facility: CLINIC | Age: 51
End: 2021-06-02

## 2021-06-05 ENCOUNTER — PATIENT MESSAGE (OUTPATIENT)
Dept: RHEUMATOLOGY | Facility: CLINIC | Age: 51
End: 2021-06-05

## 2021-06-05 DIAGNOSIS — M05.79 RHEUMATOID ARTHRITIS INVOLVING MULTIPLE SITES WITH POSITIVE RHEUMATOID FACTOR: ICD-10-CM

## 2021-06-07 RX ORDER — FOLIC ACID 1 MG/1
TABLET ORAL
Qty: 60 TABLET | Refills: 2 | Status: SHIPPED | OUTPATIENT
Start: 2021-06-07 | End: 2021-09-24 | Stop reason: SDUPTHER

## 2021-06-09 LAB — HEMOCCULT STL QL IA: NEGATIVE

## 2021-07-15 NOTE — PROGRESS NOTES
No readings in 6 months. Called to encourage patient active participation to remain active in digital medicine.      Last 5 Patient Entered Readings                Current 30 Day Average:   Recent Readings 1/22/2021 1/4/2021 12/29/2020 12/14/2020 12/7/2020   SBP (mmHg) 135 142 122 146 121   DBP (mmHg) 95 90 83 99 77   Pulse 59 71 60 72 68             Hypertension Medications             amLODIPine (NORVASC) 5 MG tablet Take 1 tablet (5 mg total) by mouth once daily.    hydroCHLOROthiazide (HYDRODIURIL) 25 MG tablet TAKE 1 TABLET(25 MG) BY MOUTH EVERY DAY    irbesartan (AVAPRO) 300 MG tablet Take 1 tablet (300 mg total) by mouth once daily.        BMP completed in May 2021, WNL.  No show to last PCP appointment in April 2021.

## 2021-07-19 ENCOUNTER — PATIENT MESSAGE (OUTPATIENT)
Dept: RHEUMATOLOGY | Facility: CLINIC | Age: 51
End: 2021-07-19

## 2021-07-19 DIAGNOSIS — M05.79 RHEUMATOID ARTHRITIS INVOLVING MULTIPLE SITES WITH POSITIVE RHEUMATOID FACTOR: Primary | ICD-10-CM

## 2021-07-19 DIAGNOSIS — D84.9 IMMUNOSUPPRESSION: ICD-10-CM

## 2021-07-19 RX ORDER — METHOTREXATE 2.5 MG/1
TABLET ORAL
Qty: 40 TABLET | Refills: 0 | Status: SHIPPED | OUTPATIENT
Start: 2021-07-19 | End: 2021-09-24 | Stop reason: SDUPTHER

## 2021-07-28 ENCOUNTER — OFFICE VISIT (OUTPATIENT)
Dept: URGENT CARE | Facility: CLINIC | Age: 51
End: 2021-07-28
Payer: COMMERCIAL

## 2021-07-28 VITALS
DIASTOLIC BLOOD PRESSURE: 94 MMHG | HEART RATE: 77 BPM | TEMPERATURE: 98 F | OXYGEN SATURATION: 97 % | BODY MASS INDEX: 41.32 KG/M2 | HEIGHT: 64 IN | SYSTOLIC BLOOD PRESSURE: 144 MMHG | WEIGHT: 242 LBS

## 2021-07-28 DIAGNOSIS — U07.1 COVID-19 VIRUS DETECTED: Primary | ICD-10-CM

## 2021-07-28 DIAGNOSIS — Z11.9 SCREENING EXAMINATION FOR UNSPECIFIED INFECTIOUS DISEASE: ICD-10-CM

## 2021-07-28 LAB
CTP QC/QA: YES
SARS-COV-2 RDRP RESP QL NAA+PROBE: POSITIVE

## 2021-07-28 PROCEDURE — U0002 COVID-19 LAB TEST NON-CDC: HCPCS | Mod: QW,S$GLB,, | Performed by: FAMILY MEDICINE

## 2021-07-28 PROCEDURE — 3008F BODY MASS INDEX DOCD: CPT | Mod: CPTII,S$GLB,, | Performed by: FAMILY MEDICINE

## 2021-07-28 PROCEDURE — 3080F DIAST BP >= 90 MM HG: CPT | Mod: CPTII,S$GLB,, | Performed by: FAMILY MEDICINE

## 2021-07-28 PROCEDURE — 99214 OFFICE O/P EST MOD 30 MIN: CPT | Mod: S$GLB,,, | Performed by: FAMILY MEDICINE

## 2021-07-28 PROCEDURE — 3008F PR BODY MASS INDEX (BMI) DOCUMENTED: ICD-10-PCS | Mod: CPTII,S$GLB,, | Performed by: FAMILY MEDICINE

## 2021-07-28 PROCEDURE — 1160F RVW MEDS BY RX/DR IN RCRD: CPT | Mod: CPTII,S$GLB,, | Performed by: FAMILY MEDICINE

## 2021-07-28 PROCEDURE — 3077F SYST BP >= 140 MM HG: CPT | Mod: CPTII,S$GLB,, | Performed by: FAMILY MEDICINE

## 2021-07-28 PROCEDURE — 1125F AMNT PAIN NOTED PAIN PRSNT: CPT | Mod: CPTII,S$GLB,, | Performed by: FAMILY MEDICINE

## 2021-07-28 PROCEDURE — 99214 PR OFFICE/OUTPT VISIT, EST, LEVL IV, 30-39 MIN: ICD-10-PCS | Mod: S$GLB,,, | Performed by: FAMILY MEDICINE

## 2021-07-28 PROCEDURE — 1160F PR REVIEW ALL MEDS BY PRESCRIBER/CLIN PHARMACIST DOCUMENTED: ICD-10-PCS | Mod: CPTII,S$GLB,, | Performed by: FAMILY MEDICINE

## 2021-07-28 PROCEDURE — U0002: ICD-10-PCS | Mod: QW,S$GLB,, | Performed by: FAMILY MEDICINE

## 2021-07-28 PROCEDURE — 1159F MED LIST DOCD IN RCRD: CPT | Mod: CPTII,S$GLB,, | Performed by: FAMILY MEDICINE

## 2021-07-28 PROCEDURE — 1125F PR PAIN SEVERITY QUANTIFIED, PAIN PRESENT: ICD-10-PCS | Mod: CPTII,S$GLB,, | Performed by: FAMILY MEDICINE

## 2021-07-28 PROCEDURE — 1159F PR MEDICATION LIST DOCUMENTED IN MEDICAL RECORD: ICD-10-PCS | Mod: CPTII,S$GLB,, | Performed by: FAMILY MEDICINE

## 2021-07-28 PROCEDURE — 3080F PR MOST RECENT DIASTOLIC BLOOD PRESSURE >= 90 MM HG: ICD-10-PCS | Mod: CPTII,S$GLB,, | Performed by: FAMILY MEDICINE

## 2021-07-28 PROCEDURE — 3077F PR MOST RECENT SYSTOLIC BLOOD PRESSURE >= 140 MM HG: ICD-10-PCS | Mod: CPTII,S$GLB,, | Performed by: FAMILY MEDICINE

## 2021-07-28 RX ORDER — BENZONATATE 200 MG/1
200 CAPSULE ORAL 3 TIMES DAILY PRN
Qty: 30 CAPSULE | Refills: 1 | Status: SHIPPED | OUTPATIENT
Start: 2021-07-28 | End: 2021-08-07

## 2021-08-01 ENCOUNTER — NURSE TRIAGE (OUTPATIENT)
Dept: ADMINISTRATIVE | Facility: CLINIC | Age: 51
End: 2021-08-01

## 2021-08-03 ENCOUNTER — INFUSION (OUTPATIENT)
Dept: INFECTIOUS DISEASES | Facility: HOSPITAL | Age: 51
End: 2021-08-03
Attending: FAMILY MEDICINE
Payer: COMMERCIAL

## 2021-08-03 VITALS
OXYGEN SATURATION: 98 % | TEMPERATURE: 99 F | RESPIRATION RATE: 18 BRPM | DIASTOLIC BLOOD PRESSURE: 78 MMHG | SYSTOLIC BLOOD PRESSURE: 135 MMHG | HEART RATE: 68 BPM

## 2021-08-03 DIAGNOSIS — U07.1 COVID-19: Primary | ICD-10-CM

## 2021-08-03 PROCEDURE — 25000003 PHARM REV CODE 250: Performed by: INTERNAL MEDICINE

## 2021-08-03 PROCEDURE — 63600175 PHARM REV CODE 636 W HCPCS: Performed by: INTERNAL MEDICINE

## 2021-08-03 PROCEDURE — M0243 CASIRIVI AND IMDEVI INFUSION: HCPCS | Performed by: INTERNAL MEDICINE

## 2021-08-03 RX ORDER — EPINEPHRINE 0.3 MG/.3ML
0.3 INJECTION SUBCUTANEOUS
Status: ACTIVE | OUTPATIENT
Start: 2021-08-03

## 2021-08-03 RX ORDER — DIPHENHYDRAMINE HYDROCHLORIDE 50 MG/ML
25 INJECTION INTRAMUSCULAR; INTRAVENOUS ONCE AS NEEDED
Status: ACTIVE | OUTPATIENT
Start: 2021-08-03 | End: 2032-12-30

## 2021-08-03 RX ORDER — ONDANSETRON 4 MG/1
4 TABLET, ORALLY DISINTEGRATING ORAL ONCE AS NEEDED
Status: ACTIVE | OUTPATIENT
Start: 2021-08-03 | End: 2032-12-30

## 2021-08-03 RX ORDER — ALBUTEROL SULFATE 90 UG/1
2 AEROSOL, METERED RESPIRATORY (INHALATION)
Status: ACTIVE | OUTPATIENT
Start: 2021-08-03

## 2021-08-03 RX ORDER — SODIUM CHLORIDE 0.9 % (FLUSH) 0.9 %
10 SYRINGE (ML) INJECTION
Status: ACTIVE | OUTPATIENT
Start: 2021-08-03

## 2021-08-03 RX ORDER — ACETAMINOPHEN 325 MG/1
650 TABLET ORAL ONCE AS NEEDED
Status: ACTIVE | OUTPATIENT
Start: 2021-08-03 | End: 2032-12-30

## 2021-08-03 RX ADMIN — CASIRIVIMAB AND IMDEVIMAB 600 MG: 600; 600 INJECTION, SOLUTION, CONCENTRATE INTRAVENOUS at 08:08

## 2021-09-11 ENCOUNTER — PATIENT MESSAGE (OUTPATIENT)
Dept: RHEUMATOLOGY | Facility: CLINIC | Age: 51
End: 2021-09-11

## 2021-09-11 ENCOUNTER — PATIENT MESSAGE (OUTPATIENT)
Dept: INTERNAL MEDICINE | Facility: CLINIC | Age: 51
End: 2021-09-11

## 2021-09-11 DIAGNOSIS — M05.79 RHEUMATOID ARTHRITIS INVOLVING MULTIPLE SITES WITH POSITIVE RHEUMATOID FACTOR: ICD-10-CM

## 2021-09-11 LAB
ALBUMIN SERPL-MCNC: 3.9 G/DL (ref 3.8–4.9)
ALBUMIN/GLOB SERPL: 1.2 {RATIO} (ref 1.2–2.2)
ALP SERPL-CCNC: 65 IU/L (ref 48–121)
ALT SERPL-CCNC: 27 IU/L (ref 0–32)
AST SERPL-CCNC: 16 IU/L (ref 0–40)
BASOPHILS # BLD AUTO: 0 X10E3/UL (ref 0–0.2)
BASOPHILS NFR BLD AUTO: 0 %
BILIRUB SERPL-MCNC: 0.2 MG/DL (ref 0–1.2)
BUN SERPL-MCNC: 13 MG/DL (ref 6–24)
BUN/CREAT SERPL: 16 (ref 9–23)
CALCIUM SERPL-MCNC: 9 MG/DL (ref 8.7–10.2)
CHLORIDE SERPL-SCNC: 104 MMOL/L (ref 96–106)
CO2 SERPL-SCNC: 23 MMOL/L (ref 20–29)
CREAT SERPL-MCNC: 0.8 MG/DL (ref 0.57–1)
CRP SERPL-MCNC: 2 MG/L (ref 0–10)
EOSINOPHIL # BLD AUTO: 0.2 X10E3/UL (ref 0–0.4)
EOSINOPHIL NFR BLD AUTO: 3 %
ERYTHROCYTE [DISTWIDTH] IN BLOOD BY AUTOMATED COUNT: 14.7 % (ref 11.7–15.4)
ERYTHROCYTE [SEDIMENTATION RATE] IN BLOOD BY WESTERGREN METHOD: 16 MM/HR (ref 0–40)
GLOBULIN SER CALC-MCNC: 3.3 G/DL (ref 1.5–4.5)
GLUCOSE SERPL-MCNC: 83 MG/DL (ref 65–99)
HCT VFR BLD AUTO: 37 % (ref 34–46.6)
HGB BLD-MCNC: 11.4 G/DL (ref 11.1–15.9)
IMM GRANULOCYTES # BLD AUTO: 0 X10E3/UL (ref 0–0.1)
IMM GRANULOCYTES NFR BLD AUTO: 0 %
LYMPHOCYTES # BLD AUTO: 1.1 X10E3/UL (ref 0.7–3.1)
LYMPHOCYTES NFR BLD AUTO: 17 %
MCH RBC QN AUTO: 28.1 PG (ref 26.6–33)
MCHC RBC AUTO-ENTMCNC: 30.8 G/DL (ref 31.5–35.7)
MCV RBC AUTO: 91 FL (ref 79–97)
MONOCYTES # BLD AUTO: 0.5 X10E3/UL (ref 0.1–0.9)
MONOCYTES NFR BLD AUTO: 7 %
NEUTROPHILS # BLD AUTO: 4.6 X10E3/UL (ref 1.4–7)
NEUTROPHILS NFR BLD AUTO: 73 %
PLATELET # BLD AUTO: 304 X10E3/UL (ref 150–450)
POTASSIUM SERPL-SCNC: 4.5 MMOL/L (ref 3.5–5.2)
PROT SERPL-MCNC: 7.2 G/DL (ref 6–8.5)
RBC # BLD AUTO: 4.05 X10E6/UL (ref 3.77–5.28)
SODIUM SERPL-SCNC: 140 MMOL/L (ref 134–144)
WBC # BLD AUTO: 6.4 X10E3/UL (ref 3.4–10.8)

## 2021-09-13 ENCOUNTER — PATIENT MESSAGE (OUTPATIENT)
Dept: RHEUMATOLOGY | Facility: CLINIC | Age: 51
End: 2021-09-13

## 2021-09-13 RX ORDER — SULFASALAZINE 500 MG/1
1500 TABLET, DELAYED RELEASE ORAL 2 TIMES DAILY
Qty: 540 TABLET | Refills: 0 | Status: CANCELLED | OUTPATIENT
Start: 2021-09-13

## 2021-09-13 RX ORDER — SULFASALAZINE 500 MG/1
1500 TABLET ORAL 2 TIMES DAILY
Qty: 540 TABLET | Refills: 0 | Status: SHIPPED | OUTPATIENT
Start: 2021-09-13 | End: 2022-01-21 | Stop reason: SDUPTHER

## 2021-09-19 ENCOUNTER — TELEPHONE (OUTPATIENT)
Dept: ADMINISTRATIVE | Facility: HOSPITAL | Age: 51
End: 2021-09-19

## 2021-09-23 ENCOUNTER — PATIENT OUTREACH (OUTPATIENT)
Dept: ADMINISTRATIVE | Facility: HOSPITAL | Age: 51
End: 2021-09-23

## 2021-09-23 ENCOUNTER — PATIENT MESSAGE (OUTPATIENT)
Dept: ADMINISTRATIVE | Facility: HOSPITAL | Age: 51
End: 2021-09-23

## 2021-09-23 ENCOUNTER — PATIENT MESSAGE (OUTPATIENT)
Dept: RHEUMATOLOGY | Facility: CLINIC | Age: 51
End: 2021-09-23

## 2021-09-23 DIAGNOSIS — Z78.0 POSTMENOPAUSAL: Primary | ICD-10-CM

## 2021-09-23 DIAGNOSIS — M05.79 RHEUMATOID ARTHRITIS INVOLVING MULTIPLE SITES WITH POSITIVE RHEUMATOID FACTOR: ICD-10-CM

## 2021-09-24 RX ORDER — METHOTREXATE 2.5 MG/1
TABLET ORAL
Qty: 40 TABLET | Refills: 0 | Status: SHIPPED | OUTPATIENT
Start: 2021-09-24 | End: 2021-12-20 | Stop reason: SDUPTHER

## 2021-09-24 RX ORDER — HYDROXYCHLOROQUINE SULFATE 200 MG/1
200 TABLET, FILM COATED ORAL 2 TIMES DAILY
Qty: 60 TABLET | Refills: 2 | Status: SHIPPED | OUTPATIENT
Start: 2021-09-24 | End: 2022-01-31

## 2021-09-24 RX ORDER — FOLIC ACID 1 MG/1
TABLET ORAL
Qty: 60 TABLET | Refills: 2 | Status: SHIPPED | OUTPATIENT
Start: 2021-09-24 | End: 2021-12-20

## 2021-10-12 ENCOUNTER — OFFICE VISIT (OUTPATIENT)
Dept: RHEUMATOLOGY | Facility: CLINIC | Age: 51
End: 2021-10-12
Payer: COMMERCIAL

## 2021-10-12 VITALS
DIASTOLIC BLOOD PRESSURE: 84 MMHG | HEIGHT: 63 IN | BODY MASS INDEX: 43.43 KG/M2 | HEART RATE: 62 BPM | WEIGHT: 245.13 LBS | SYSTOLIC BLOOD PRESSURE: 122 MMHG

## 2021-10-12 DIAGNOSIS — M05.79 RHEUMATOID ARTHRITIS INVOLVING MULTIPLE SITES WITH POSITIVE RHEUMATOID FACTOR: Primary | ICD-10-CM

## 2021-10-12 DIAGNOSIS — D84.9 IMMUNOSUPPRESSION: ICD-10-CM

## 2021-10-12 PROCEDURE — 3008F BODY MASS INDEX DOCD: CPT | Mod: CPTII,S$GLB,, | Performed by: INTERNAL MEDICINE

## 2021-10-12 PROCEDURE — 99214 PR OFFICE/OUTPT VISIT, EST, LEVL IV, 30-39 MIN: ICD-10-PCS | Mod: S$GLB,,, | Performed by: INTERNAL MEDICINE

## 2021-10-12 PROCEDURE — 3074F PR MOST RECENT SYSTOLIC BLOOD PRESSURE < 130 MM HG: ICD-10-PCS | Mod: CPTII,S$GLB,, | Performed by: INTERNAL MEDICINE

## 2021-10-12 PROCEDURE — 99214 OFFICE O/P EST MOD 30 MIN: CPT | Mod: S$GLB,,, | Performed by: INTERNAL MEDICINE

## 2021-10-12 PROCEDURE — 3074F SYST BP LT 130 MM HG: CPT | Mod: CPTII,S$GLB,, | Performed by: INTERNAL MEDICINE

## 2021-10-12 PROCEDURE — 4010F PR ACE/ARB THEARPY RXD/TAKEN: ICD-10-PCS | Mod: CPTII,S$GLB,, | Performed by: INTERNAL MEDICINE

## 2021-10-12 PROCEDURE — 3079F PR MOST RECENT DIASTOLIC BLOOD PRESSURE 80-89 MM HG: ICD-10-PCS | Mod: CPTII,S$GLB,, | Performed by: INTERNAL MEDICINE

## 2021-10-12 PROCEDURE — 1159F MED LIST DOCD IN RCRD: CPT | Mod: CPTII,S$GLB,, | Performed by: INTERNAL MEDICINE

## 2021-10-12 PROCEDURE — 4010F ACE/ARB THERAPY RXD/TAKEN: CPT | Mod: CPTII,S$GLB,, | Performed by: INTERNAL MEDICINE

## 2021-10-12 PROCEDURE — 3008F PR BODY MASS INDEX (BMI) DOCUMENTED: ICD-10-PCS | Mod: CPTII,S$GLB,, | Performed by: INTERNAL MEDICINE

## 2021-10-12 PROCEDURE — 1159F PR MEDICATION LIST DOCUMENTED IN MEDICAL RECORD: ICD-10-PCS | Mod: CPTII,S$GLB,, | Performed by: INTERNAL MEDICINE

## 2021-10-12 PROCEDURE — 3079F DIAST BP 80-89 MM HG: CPT | Mod: CPTII,S$GLB,, | Performed by: INTERNAL MEDICINE

## 2021-10-12 PROCEDURE — 99999 PR PBB SHADOW E&M-EST. PATIENT-LVL V: ICD-10-PCS | Mod: PBBFAC,,, | Performed by: INTERNAL MEDICINE

## 2021-10-12 PROCEDURE — 1160F RVW MEDS BY RX/DR IN RCRD: CPT | Mod: CPTII,S$GLB,, | Performed by: INTERNAL MEDICINE

## 2021-10-12 PROCEDURE — 1160F PR REVIEW ALL MEDS BY PRESCRIBER/CLIN PHARMACIST DOCUMENTED: ICD-10-PCS | Mod: CPTII,S$GLB,, | Performed by: INTERNAL MEDICINE

## 2021-10-12 PROCEDURE — 99999 PR PBB SHADOW E&M-EST. PATIENT-LVL V: CPT | Mod: PBBFAC,,, | Performed by: INTERNAL MEDICINE

## 2021-12-13 ENCOUNTER — PATIENT MESSAGE (OUTPATIENT)
Dept: INTERNAL MEDICINE | Facility: CLINIC | Age: 51
End: 2021-12-13
Payer: COMMERCIAL

## 2021-12-20 DIAGNOSIS — M05.79 RHEUMATOID ARTHRITIS INVOLVING MULTIPLE SITES WITH POSITIVE RHEUMATOID FACTOR: ICD-10-CM

## 2021-12-20 RX ORDER — METHOTREXATE 2.5 MG/1
TABLET ORAL
Qty: 40 TABLET | Refills: 2 | Status: SHIPPED | OUTPATIENT
Start: 2021-12-20 | End: 2022-03-14 | Stop reason: SDUPTHER

## 2021-12-21 LAB
ALBUMIN SERPL-MCNC: 3.9 G/DL (ref 3.8–4.9)
ALBUMIN/GLOB SERPL: 1.3 {RATIO} (ref 1.2–2.2)
ALP SERPL-CCNC: 67 IU/L (ref 44–121)
ALT SERPL-CCNC: 34 IU/L (ref 0–32)
AST SERPL-CCNC: 25 IU/L (ref 0–40)
BASOPHILS # BLD AUTO: 0 X10E3/UL (ref 0–0.2)
BASOPHILS NFR BLD AUTO: 0 %
BILIRUB SERPL-MCNC: 0.3 MG/DL (ref 0–1.2)
BUN SERPL-MCNC: 18 MG/DL (ref 6–24)
BUN/CREAT SERPL: 26 (ref 9–23)
CALCIUM SERPL-MCNC: 9.2 MG/DL (ref 8.7–10.2)
CHLORIDE SERPL-SCNC: 104 MMOL/L (ref 96–106)
CO2 SERPL-SCNC: 22 MMOL/L (ref 20–29)
CREAT SERPL-MCNC: 0.7 MG/DL (ref 0.57–1)
CRP SERPL-MCNC: 2 MG/L (ref 0–10)
EOSINOPHIL # BLD AUTO: 0.2 X10E3/UL (ref 0–0.4)
EOSINOPHIL NFR BLD AUTO: 3 %
ERYTHROCYTE [DISTWIDTH] IN BLOOD BY AUTOMATED COUNT: 14.9 % (ref 11.7–15.4)
ERYTHROCYTE [SEDIMENTATION RATE] IN BLOOD BY WESTERGREN METHOD: 31 MM/HR (ref 0–40)
GLOBULIN SER CALC-MCNC: 3 G/DL (ref 1.5–4.5)
GLUCOSE SERPL-MCNC: 88 MG/DL (ref 65–99)
HCT VFR BLD AUTO: 39.1 % (ref 34–46.6)
HGB BLD-MCNC: 12 G/DL (ref 11.1–15.9)
IMM GRANULOCYTES # BLD AUTO: 0 X10E3/UL (ref 0–0.1)
IMM GRANULOCYTES NFR BLD AUTO: 0 %
LYMPHOCYTES # BLD AUTO: 1.2 X10E3/UL (ref 0.7–3.1)
LYMPHOCYTES NFR BLD AUTO: 23 %
MCH RBC QN AUTO: 27.5 PG (ref 26.6–33)
MCHC RBC AUTO-ENTMCNC: 30.7 G/DL (ref 31.5–35.7)
MCV RBC AUTO: 90 FL (ref 79–97)
MONOCYTES # BLD AUTO: 0.4 X10E3/UL (ref 0.1–0.9)
MONOCYTES NFR BLD AUTO: 8 %
NEUTROPHILS # BLD AUTO: 3.4 X10E3/UL (ref 1.4–7)
NEUTROPHILS NFR BLD AUTO: 66 %
PLATELET # BLD AUTO: 297 X10E3/UL (ref 150–450)
POTASSIUM SERPL-SCNC: 4.5 MMOL/L (ref 3.5–5.2)
PROT SERPL-MCNC: 6.9 G/DL (ref 6–8.5)
RBC # BLD AUTO: 4.37 X10E6/UL (ref 3.77–5.28)
SODIUM SERPL-SCNC: 139 MMOL/L (ref 134–144)
WBC # BLD AUTO: 5.2 X10E3/UL (ref 3.4–10.8)

## 2021-12-22 ENCOUNTER — HOSPITAL ENCOUNTER (OUTPATIENT)
Dept: RADIOLOGY | Facility: OTHER | Age: 51
Discharge: HOME | End: 2021-12-22
Attending: INTERNAL MEDICINE
Payer: COMMERCIAL

## 2021-12-22 ENCOUNTER — OFFICE VISIT (OUTPATIENT)
Dept: INTERNAL MEDICINE | Facility: CLINIC | Age: 51
End: 2021-12-22
Attending: INTERNAL MEDICINE
Payer: COMMERCIAL

## 2021-12-22 ENCOUNTER — TELEPHONE (OUTPATIENT)
Dept: UROGYNECOLOGY | Facility: CLINIC | Age: 51
End: 2021-12-22
Payer: COMMERCIAL

## 2021-12-22 VITALS
HEIGHT: 63 IN | HEART RATE: 56 BPM | SYSTOLIC BLOOD PRESSURE: 140 MMHG | WEIGHT: 253.06 LBS | BODY MASS INDEX: 44.84 KG/M2 | DIASTOLIC BLOOD PRESSURE: 82 MMHG | OXYGEN SATURATION: 99 %

## 2021-12-22 DIAGNOSIS — I10 PRIMARY HYPERTENSION: ICD-10-CM

## 2021-12-22 DIAGNOSIS — M72.2 PLANTAR FASCIITIS: ICD-10-CM

## 2021-12-22 DIAGNOSIS — M05.79 RHEUMATOID ARTHRITIS INVOLVING MULTIPLE SITES WITH POSITIVE RHEUMATOID FACTOR: ICD-10-CM

## 2021-12-22 DIAGNOSIS — M25.471 RIGHT ANKLE SWELLING: ICD-10-CM

## 2021-12-22 DIAGNOSIS — Z80.3 FAMILY HISTORY OF BREAST CANCER: ICD-10-CM

## 2021-12-22 DIAGNOSIS — Z00.00 ANNUAL PHYSICAL EXAM: Primary | ICD-10-CM

## 2021-12-22 PROCEDURE — 1160F PR REVIEW ALL MEDS BY PRESCRIBER/CLIN PHARMACIST DOCUMENTED: ICD-10-PCS | Mod: CPTII,S$GLB,, | Performed by: INTERNAL MEDICINE

## 2021-12-22 PROCEDURE — 73610 X-RAY EXAM OF ANKLE: CPT | Mod: TC,FY,RT

## 2021-12-22 PROCEDURE — 3077F SYST BP >= 140 MM HG: CPT | Mod: CPTII,S$GLB,, | Performed by: INTERNAL MEDICINE

## 2021-12-22 PROCEDURE — 4010F PR ACE/ARB THEARPY RXD/TAKEN: ICD-10-PCS | Mod: CPTII,S$GLB,, | Performed by: INTERNAL MEDICINE

## 2021-12-22 PROCEDURE — 3077F PR MOST RECENT SYSTOLIC BLOOD PRESSURE >= 140 MM HG: ICD-10-PCS | Mod: CPTII,S$GLB,, | Performed by: INTERNAL MEDICINE

## 2021-12-22 PROCEDURE — 73610 XR ANKLE COMPLETE 3 VIEW RIGHT: ICD-10-PCS | Mod: 26,RT,, | Performed by: RADIOLOGY

## 2021-12-22 PROCEDURE — 99396 PREV VISIT EST AGE 40-64: CPT | Mod: S$GLB,,, | Performed by: INTERNAL MEDICINE

## 2021-12-22 PROCEDURE — 3079F PR MOST RECENT DIASTOLIC BLOOD PRESSURE 80-89 MM HG: ICD-10-PCS | Mod: CPTII,S$GLB,, | Performed by: INTERNAL MEDICINE

## 2021-12-22 PROCEDURE — 3079F DIAST BP 80-89 MM HG: CPT | Mod: CPTII,S$GLB,, | Performed by: INTERNAL MEDICINE

## 2021-12-22 PROCEDURE — 99999 PR PBB SHADOW E&M-EST. PATIENT-LVL IV: ICD-10-PCS | Mod: PBBFAC,,, | Performed by: INTERNAL MEDICINE

## 2021-12-22 PROCEDURE — 4010F ACE/ARB THERAPY RXD/TAKEN: CPT | Mod: CPTII,S$GLB,, | Performed by: INTERNAL MEDICINE

## 2021-12-22 PROCEDURE — 3008F BODY MASS INDEX DOCD: CPT | Mod: CPTII,S$GLB,, | Performed by: INTERNAL MEDICINE

## 2021-12-22 PROCEDURE — 1159F PR MEDICATION LIST DOCUMENTED IN MEDICAL RECORD: ICD-10-PCS | Mod: CPTII,S$GLB,, | Performed by: INTERNAL MEDICINE

## 2021-12-22 PROCEDURE — 1159F MED LIST DOCD IN RCRD: CPT | Mod: CPTII,S$GLB,, | Performed by: INTERNAL MEDICINE

## 2021-12-22 PROCEDURE — 73610 X-RAY EXAM OF ANKLE: CPT | Mod: 26,RT,, | Performed by: RADIOLOGY

## 2021-12-22 PROCEDURE — 99396 PR PREVENTIVE VISIT,EST,40-64: ICD-10-PCS | Mod: S$GLB,,, | Performed by: INTERNAL MEDICINE

## 2021-12-22 PROCEDURE — 1160F RVW MEDS BY RX/DR IN RCRD: CPT | Mod: CPTII,S$GLB,, | Performed by: INTERNAL MEDICINE

## 2021-12-22 PROCEDURE — 3008F PR BODY MASS INDEX (BMI) DOCUMENTED: ICD-10-PCS | Mod: CPTII,S$GLB,, | Performed by: INTERNAL MEDICINE

## 2021-12-22 PROCEDURE — 99999 PR PBB SHADOW E&M-EST. PATIENT-LVL IV: CPT | Mod: PBBFAC,,, | Performed by: INTERNAL MEDICINE

## 2021-12-23 ENCOUNTER — TELEPHONE (OUTPATIENT)
Dept: UROGYNECOLOGY | Facility: CLINIC | Age: 51
End: 2021-12-23
Payer: COMMERCIAL

## 2021-12-23 ENCOUNTER — PATIENT MESSAGE (OUTPATIENT)
Dept: INTERNAL MEDICINE | Facility: CLINIC | Age: 51
End: 2021-12-23
Payer: COMMERCIAL

## 2021-12-23 ENCOUNTER — IMMUNIZATION (OUTPATIENT)
Dept: PHARMACY | Facility: CLINIC | Age: 51
End: 2021-12-23
Payer: COMMERCIAL

## 2021-12-23 ENCOUNTER — TELEPHONE (OUTPATIENT)
Dept: INTERNAL MEDICINE | Facility: CLINIC | Age: 51
End: 2021-12-23
Payer: COMMERCIAL

## 2021-12-23 RX ORDER — IBUPROFEN 600 MG/1
600 TABLET ORAL EVERY 6 HOURS PRN
Qty: 20 TABLET | Refills: 0 | Status: SHIPPED | OUTPATIENT
Start: 2021-12-23 | End: 2022-01-19

## 2021-12-29 ENCOUNTER — TELEPHONE (OUTPATIENT)
Dept: INTERNAL MEDICINE | Facility: CLINIC | Age: 51
End: 2021-12-29
Payer: COMMERCIAL

## 2022-01-01 LAB
CHOLEST SERPL-MCNC: 211 MG/DL (ref 100–199)
HBA1C MFR BLD: 5.8 % (ref 4.8–5.6)
HDLC SERPL-MCNC: 80 MG/DL
LDLC SERPL CALC-MCNC: 123 MG/DL (ref 0–99)
TRIGL SERPL-MCNC: 42 MG/DL (ref 0–149)
TSH SERPL DL<=0.005 MIU/L-ACNC: 1.94 UIU/ML (ref 0.45–4.5)
VLDLC SERPL CALC-MCNC: 8 MG/DL (ref 5–40)

## 2022-01-07 ENCOUNTER — PATIENT MESSAGE (OUTPATIENT)
Dept: RHEUMATOLOGY | Facility: CLINIC | Age: 52
End: 2022-01-07
Payer: COMMERCIAL

## 2022-01-07 ENCOUNTER — PATIENT MESSAGE (OUTPATIENT)
Dept: INTERNAL MEDICINE | Facility: CLINIC | Age: 52
End: 2022-01-07
Payer: COMMERCIAL

## 2022-01-19 ENCOUNTER — OFFICE VISIT (OUTPATIENT)
Dept: UROGYNECOLOGY | Facility: CLINIC | Age: 52
End: 2022-01-19
Payer: COMMERCIAL

## 2022-01-19 ENCOUNTER — PATIENT OUTREACH (OUTPATIENT)
Dept: ADMINISTRATIVE | Facility: OTHER | Age: 52
End: 2022-01-19
Payer: COMMERCIAL

## 2022-01-19 VITALS
DIASTOLIC BLOOD PRESSURE: 81 MMHG | BODY MASS INDEX: 44.18 KG/M2 | WEIGHT: 249.31 LBS | HEIGHT: 63 IN | HEART RATE: 67 BPM | SYSTOLIC BLOOD PRESSURE: 136 MMHG

## 2022-01-19 DIAGNOSIS — K59.00 CONSTIPATION, UNSPECIFIED CONSTIPATION TYPE: ICD-10-CM

## 2022-01-19 DIAGNOSIS — Z12.4 ENCOUNTER FOR SCREENING FOR CERVICAL CANCER: ICD-10-CM

## 2022-01-19 DIAGNOSIS — Z01.419 WELL WOMAN EXAM: Primary | ICD-10-CM

## 2022-01-19 PROCEDURE — 88175 CYTOPATH C/V AUTO FLUID REDO: CPT | Performed by: NURSE PRACTITIONER

## 2022-01-19 PROCEDURE — 99999 PR PBB SHADOW E&M-EST. PATIENT-LVL V: ICD-10-PCS | Mod: PBBFAC,,, | Performed by: NURSE PRACTITIONER

## 2022-01-19 PROCEDURE — 3079F DIAST BP 80-89 MM HG: CPT | Mod: CPTII,S$GLB,, | Performed by: NURSE PRACTITIONER

## 2022-01-19 PROCEDURE — 3008F PR BODY MASS INDEX (BMI) DOCUMENTED: ICD-10-PCS | Mod: CPTII,S$GLB,, | Performed by: NURSE PRACTITIONER

## 2022-01-19 PROCEDURE — 3075F SYST BP GE 130 - 139MM HG: CPT | Mod: CPTII,S$GLB,, | Performed by: NURSE PRACTITIONER

## 2022-01-19 PROCEDURE — 3079F PR MOST RECENT DIASTOLIC BLOOD PRESSURE 80-89 MM HG: ICD-10-PCS | Mod: CPTII,S$GLB,, | Performed by: NURSE PRACTITIONER

## 2022-01-19 PROCEDURE — 3075F PR MOST RECENT SYSTOLIC BLOOD PRESS GE 130-139MM HG: ICD-10-PCS | Mod: CPTII,S$GLB,, | Performed by: NURSE PRACTITIONER

## 2022-01-19 PROCEDURE — 99386 PREV VISIT NEW AGE 40-64: CPT | Mod: S$GLB,,, | Performed by: NURSE PRACTITIONER

## 2022-01-19 PROCEDURE — 1159F PR MEDICATION LIST DOCUMENTED IN MEDICAL RECORD: ICD-10-PCS | Mod: CPTII,S$GLB,, | Performed by: NURSE PRACTITIONER

## 2022-01-19 PROCEDURE — 1160F RVW MEDS BY RX/DR IN RCRD: CPT | Mod: CPTII,S$GLB,, | Performed by: NURSE PRACTITIONER

## 2022-01-19 PROCEDURE — 3008F BODY MASS INDEX DOCD: CPT | Mod: CPTII,S$GLB,, | Performed by: NURSE PRACTITIONER

## 2022-01-19 PROCEDURE — 1159F MED LIST DOCD IN RCRD: CPT | Mod: CPTII,S$GLB,, | Performed by: NURSE PRACTITIONER

## 2022-01-19 PROCEDURE — 99386 PR PREVENTIVE VISIT,NEW,40-64: ICD-10-PCS | Mod: S$GLB,,, | Performed by: NURSE PRACTITIONER

## 2022-01-19 PROCEDURE — 1160F PR REVIEW ALL MEDS BY PRESCRIBER/CLIN PHARMACIST DOCUMENTED: ICD-10-PCS | Mod: CPTII,S$GLB,, | Performed by: NURSE PRACTITIONER

## 2022-01-19 PROCEDURE — 99999 PR PBB SHADOW E&M-EST. PATIENT-LVL V: CPT | Mod: PBBFAC,,, | Performed by: NURSE PRACTITIONER

## 2022-01-19 PROCEDURE — 87624 HPV HI-RISK TYP POOLED RSLT: CPT | Performed by: NURSE PRACTITIONER

## 2022-01-19 NOTE — PATIENT INSTRUCTIONS
1. Well woman  --- pap smear today  --results take 2-3 weeks    2. Constipation:  --  Start daily fiber.  Take 1 tsp of fiber powder (psyllium or other sugar-free powder).  Mix in 8 oz of water.  Take x 3-5 days.  Then, increase fiber by 1 tsp every 3-5 days until stool is easy to pass.  Stop and continue at that dose.   Do not exceed 6 tsps/day.    --start stool softener 1-2 x/day.    RTC 1 year for annual

## 2022-01-19 NOTE — PROGRESS NOTES
01/19/2022    SUBJECTIVE:   51 y.o. female for annual exam.   Past Medical History:   Diagnosis Date    Allergy     Anemia 12/7/2012    Arthritis     rheumatoid    Hypertension     Rheumatoid arthritis     Rheumatoid arthritis        Past Surgical History:   Procedure Laterality Date    BREAST CYST EXCISION Left     age 14    BREAST SURGERY       left breast lump removal 14 years old    FRACTURE SURGERY  2012, may 14    right ankle       Family History   Problem Relation Age of Onset    Breast cancer Mother 35    Lupus Cousin     Osteoarthritis Neg Hx     Psoriasis Neg Hx     Rheum arthritis Neg Hx     Eczema Neg Hx        Social History     Socioeconomic History    Marital status:    Tobacco Use    Smoking status: Never Smoker    Smokeless tobacco: Never Used   Substance and Sexual Activity    Alcohol use: Yes     Comment: occasionally, aware of risks/benefits    Drug use: No       Current Outpatient Medications   Medication Sig Dispense Refill    acetaminophen (TYLENOL) 500 MG tablet Take 2 tablets (1,000 mg total) by mouth every 6 (six) hours as needed for Pain. 30 tablet 0    amLODIPine (NORVASC) 5 MG tablet TAKE 1 TABLET(5 MG) BY MOUTH EVERY DAY 90 tablet 2    flu vac [id number] 36mos up,PF, 60 mcg (15 mcg x 4)/0.5 mL Syrg 0.5 mLs. 0.5 mL 0    folic acid (FOLVITE) 1 MG tablet TAKE 2 TABLETS BY MOUTH EVERY DAY 60 tablet 2    hydroCHLOROthiazide (HYDRODIURIL) 25 MG tablet TAKE 1 TABLET(25 MG) BY MOUTH EVERY DAY 90 tablet 2    hydrOXYchloroQUINE (PLAQUENIL) 200 mg tablet Take 1 tablet (200 mg total) by mouth 2 (two) times daily. 60 tablet 2    irbesartan (AVAPRO) 300 MG tablet TAKE 1 TABLET(300 MG) BY MOUTH EVERY DAY 90 tablet 2    methotrexate 2.5 MG Tab TAKE 10 TABLETS BY MOUTH EVERY 7 DAYS 40 tablet 2    sulfaSALAzine (AZULFIDINE) 500 mg Tab Take 3 tablets (1,500 mg total) by mouth 2 (two) times daily. 540 tablet 0    sulfaSALAzine (AZULFIDINE) 500 mg Tab Take 3 tablets  (1,500 mg total) by mouth 2 (two) times a day. 540 tablet 0    vitamin D (VITAMIN D3) 1000 units Tab Take 1,000 Units by mouth once daily.      diclofenac sodium (VOLTAREN) 1 % Gel Apply 2 g topically 4 (four) times daily as needed (Apply to painful area up to 4 times a day as needed for pain). Apply to painful area 4 times a day as needed for pain (Patient not taking: Reported on 2022) 1 Tube 0    ibuprofen (ADVIL,MOTRIN) 600 MG tablet Take 1 tablet (600 mg total) by mouth every 6 (six) hours as needed for Pain (Take with food as needed for mild-to-moderate pain). (Patient not taking: Reported on 2022) 20 tablet 0     Current Facility-Administered Medications   Medication Dose Route Frequency Provider Last Rate Last Admin    acetaminophen tablet 650 mg  650 mg Oral Once PRN Héctor Castañeda MD        albuterol inhaler 2 puff  2 puff Inhalation Q20 Min PRN Héctor Castañeda MD        diphenhydrAMINE injection 25 mg  25 mg Intravenous Once PRN Héctor Castañeda MD        EPINEPHrine (EPIPEN) 0.3 mg/0.3 mL pen injection 0.3 mg  0.3 mg Intramuscular PRN Héctor Castañeda MD        methylPREDNISolone sodium succinate injection 40 mg  40 mg Intravenous Once PRN Héctor Castañeda MD        ondansetron disintegrating tablet 4 mg  4 mg Oral Once PRN Héctor Castañeda MD        sodium chloride 0.9% 500 mL flush bag   Intravenous PRN Héctor Castañeda MD        sodium chloride 0.9% flush 10 mL  10 mL Intravenous PRN Héctor Castañeda MD           Review of patient's allergies indicates:   Allergen Reactions    Humira [adalimumab] Rash     Humira caused drug-induced lupus       Allergies: Humira [adalimumab]   Patient's last menstrual period was 2019.         Well Woman:  Pap:2016 normal  Mammo:2022-- normal per patient report at [facility]  Colonoscopy: normal  Dexa:2012 normal    EMBX  normal    ROS:  Feeling well. No dyspnea or chest pain on exertion.    No abdominal  "pain, change in bowel habits, black or bloody stools.    No urinary tract symptoms.   GYN ROS: normal menses, no abnormal bleeding, pelvic pain or discharge, no breast pain or new or enlarging lumps on self exam.   No neurological complaints.    OBJECTIVE:   The patient appears well, alert, oriented x 3, in no distress.  /81   Pulse 67   Ht 5' 3" (1.6 m)   Wt 113.1 kg (249 lb 4.8 oz)   LMP 11/01/2019   BMI 44.16 kg/m²   ENT normal.  Neck supple. No adenopathy or thyromegaly.   GEM.   Normal respiratory effort.   Pulse with regular rate and rhythm.   Abdomen soft without tenderness, guarding, mass or organomegaly.   Extremities show no edema, normal peripheral pulses.   Neurological is normal, no focal findings.    BREAST EXAM: breasts appear normal, no suspicious masses, no skin or nipple changes or axillary nodes    PELVIC EXAM:   VULVA: normal appearing vulva with no masses, tenderness or lesions,   VAGINA: normal appearing vagina with normal color and discharge, no lesions,   CERVIX: normal appearing cervix without discharge or lesions,   UTERUS: uterus is enlarged to 8 week's size, no masses palpable  ADNEXA: no masses  RECTUM: small hemorrhoid    ASSESSMENT:   Well woman    PLAN:   1. Well woman  --- pap smear today  --results take 2-3 weeks    2. Constipation:  --  Start daily fiber.  Take 1 tsp of fiber powder (psyllium or other sugar-free powder).  Mix in 8 oz of water.  Take x 3-5 days.  Then, increase fiber by 1 tsp every 3-5 days until stool is easy to pass.  Stop and continue at that dose.   Do not exceed 6 tsps/day.    --start stool softener 1-2 x/day.    RTC 1 year for annual    30 minutes were spent in face to face time with this patient  90 % of this time was spent in counseling and/or coordination of care    [facility]  Division of Female Pelvic Medicine and Reconstructive Surgery  Department of Obstetrics & Gynecology          "

## 2022-01-19 NOTE — PROGRESS NOTES
Care Everywhere: updated  Immunization: updated  Health Maintenance: updated  Media Review: review for outside mammogram report   Legacy Review:   [facility]:uploaded 2021 mammogram report to health maintenance   Order placed:  Upcoming appts:  EFAX:  Task Tickets:  Referrals:

## 2022-01-21 DIAGNOSIS — M05.79 RHEUMATOID ARTHRITIS INVOLVING MULTIPLE SITES WITH POSITIVE RHEUMATOID FACTOR: ICD-10-CM

## 2022-01-21 RX ORDER — SULFASALAZINE 500 MG/1
1500 TABLET ORAL 2 TIMES DAILY
Qty: 540 TABLET | Refills: 0 | Status: SHIPPED | OUTPATIENT
Start: 2022-01-21 | End: 2022-05-11

## 2022-01-25 LAB
FINAL PATHOLOGIC DIAGNOSIS: NORMAL
Lab: NORMAL

## 2022-01-26 ENCOUNTER — PATIENT MESSAGE (OUTPATIENT)
Dept: UROGYNECOLOGY | Facility: CLINIC | Age: 52
End: 2022-01-26
Payer: COMMERCIAL

## 2022-01-26 LAB
HPV HR 12 DNA SPEC QL NAA+PROBE: NEGATIVE
HPV16 AG SPEC QL: NEGATIVE
HPV18 DNA SPEC QL NAA+PROBE: NEGATIVE

## 2022-01-30 DIAGNOSIS — I10 ESSENTIAL HYPERTENSION: ICD-10-CM

## 2022-01-30 NOTE — TELEPHONE ENCOUNTER
No new care gaps identified.  Powered by [employer]. Reference number: [id number].   1/30/2022 8:55:16 AM CST

## 2022-02-07 ENCOUNTER — OFFICE VISIT (OUTPATIENT)
Dept: RHEUMATOLOGY | Facility: CLINIC | Age: 52
End: 2022-02-07
Payer: COMMERCIAL

## 2022-02-07 VITALS
HEIGHT: 64 IN | BODY MASS INDEX: 44.11 KG/M2 | DIASTOLIC BLOOD PRESSURE: 72 MMHG | SYSTOLIC BLOOD PRESSURE: 118 MMHG | HEART RATE: 76 BPM | WEIGHT: 258.38 LBS

## 2022-02-07 DIAGNOSIS — D84.9 IMMUNOSUPPRESSION: ICD-10-CM

## 2022-02-07 DIAGNOSIS — M05.79 RHEUMATOID ARTHRITIS INVOLVING MULTIPLE SITES WITH POSITIVE RHEUMATOID FACTOR: Primary | ICD-10-CM

## 2022-02-07 PROCEDURE — 1159F PR MEDICATION LIST DOCUMENTED IN MEDICAL RECORD: ICD-10-PCS | Mod: CPTII,S$GLB,, | Performed by: INTERNAL MEDICINE

## 2022-02-07 PROCEDURE — 99214 PR OFFICE/OUTPT VISIT, EST, LEVL IV, 30-39 MIN: ICD-10-PCS | Mod: S$GLB,,, | Performed by: INTERNAL MEDICINE

## 2022-02-07 PROCEDURE — 3074F PR MOST RECENT SYSTOLIC BLOOD PRESSURE < 130 MM HG: ICD-10-PCS | Mod: CPTII,S$GLB,, | Performed by: INTERNAL MEDICINE

## 2022-02-07 PROCEDURE — 3008F BODY MASS INDEX DOCD: CPT | Mod: CPTII,S$GLB,, | Performed by: INTERNAL MEDICINE

## 2022-02-07 PROCEDURE — 3074F SYST BP LT 130 MM HG: CPT | Mod: CPTII,S$GLB,, | Performed by: INTERNAL MEDICINE

## 2022-02-07 PROCEDURE — 1160F PR REVIEW ALL MEDS BY PRESCRIBER/CLIN PHARMACIST DOCUMENTED: ICD-10-PCS | Mod: CPTII,S$GLB,, | Performed by: INTERNAL MEDICINE

## 2022-02-07 PROCEDURE — 99999 PR PBB SHADOW E&M-EST. PATIENT-LVL IV: CPT | Mod: PBBFAC,,, | Performed by: INTERNAL MEDICINE

## 2022-02-07 PROCEDURE — 3008F PR BODY MASS INDEX (BMI) DOCUMENTED: ICD-10-PCS | Mod: CPTII,S$GLB,, | Performed by: INTERNAL MEDICINE

## 2022-02-07 PROCEDURE — 99999 PR PBB SHADOW E&M-EST. PATIENT-LVL IV: ICD-10-PCS | Mod: PBBFAC,,, | Performed by: INTERNAL MEDICINE

## 2022-02-07 PROCEDURE — 99214 OFFICE O/P EST MOD 30 MIN: CPT | Mod: S$GLB,,, | Performed by: INTERNAL MEDICINE

## 2022-02-07 PROCEDURE — 3078F PR MOST RECENT DIASTOLIC BLOOD PRESSURE < 80 MM HG: ICD-10-PCS | Mod: CPTII,S$GLB,, | Performed by: INTERNAL MEDICINE

## 2022-02-07 PROCEDURE — 1159F MED LIST DOCD IN RCRD: CPT | Mod: CPTII,S$GLB,, | Performed by: INTERNAL MEDICINE

## 2022-02-07 PROCEDURE — 1160F RVW MEDS BY RX/DR IN RCRD: CPT | Mod: CPTII,S$GLB,, | Performed by: INTERNAL MEDICINE

## 2022-02-07 PROCEDURE — 3078F DIAST BP <80 MM HG: CPT | Mod: CPTII,S$GLB,, | Performed by: INTERNAL MEDICINE

## 2022-02-07 ASSESSMENT — ROUTINE ASSESSMENT OF PATIENT INDEX DATA (RAPID3)
PAIN SCORE: 1
PATIENT GLOBAL ASSESSMENT SCORE: 0
PSYCHOLOGICAL DISTRESS SCORE: 0
AM STIFFNESS SCORE: 0, NO
FATIGUE SCORE: 0.5
TOTAL RAPID3 SCORE: 0.67
MDHAQ FUNCTION SCORE: 0.3

## 2022-02-07 NOTE — PROGRESS NOTES
"Subjective:       Patient ID: Antonio Ruiz is a 51 y.o. female.    Chief Complaint: Rheumatoid arthritis    HPI:  Antonio Ruiz is a 51 y.o. female with a history   of rheumatoid arthritis manifested by symmetrical small joint involvement,   history of elbow contracture on the right in the past, positive rheumatoid   factor, positive WILMA. Humira stopped due to rash and pain.  Orencia did not help.    The patient currently takes Methotrexate 25 mg weekly, folic acid daily, sulfasalazine 1500 mg twice a day and Plaquenil 200 mg bid.    Patient off prednisone.    Denies pain except when climbing steps.  Pain 0/10 ache.   No morning stiffness.          Review of Systems   Constitutional: Positive for fatigue. Negative for fever and unexpected weight change.   HENT: Negative.  Negative for mouth sores and trouble swallowing.    Eyes: Negative.  Negative for redness.   Respiratory: Negative for cough and shortness of breath.    Cardiovascular: Negative.  Negative for chest pain.   Gastrointestinal: Negative.  Negative for constipation and diarrhea.   Endocrine: Negative.    Genitourinary: Negative.  Negative for dysuria and genital sores.   Musculoskeletal: Positive for arthralgias.   Skin: Negative for rash.   Allergic/Immunologic: Negative.    Neurological: Negative.  Negative for headaches.   Hematological: Negative.  Does not bruise/bleed easily.   Psychiatric/Behavioral: Negative.          Objective:   /72   Pulse 76   Ht 5' 4" (1.626 m)   Wt 117.2 kg (258 lb 6.1 oz)   LMP 11/01/2019   BMI 44.35 kg/m²      Physical Exam   Constitutional: She is oriented to person, place, and time.   HENT:   Head: Normocephalic and atraumatic.   Eyes: Conjunctivae are normal.   Cardiovascular: Normal rate, regular rhythm and normal heart sounds.   Pulmonary/Chest: Effort normal and breath sounds normal.   Abdominal: Soft. Bowel sounds are normal.   Musculoskeletal:         General: No tenderness or deformity. "      Cervical back: Neck supple.      Comments: 28 joint count: 0 swollen and 0 tender  No MTP tenderness bilaterally  No pain on palpation left 4th flexor tendon    Neurological: She is alert and oriented to person, place, and time. No cranial nerve deficit. Gait normal.   Skin: Skin is warm and dry.   Psychiatric: Mood and affect normal.     LABS    Component      Latest Ref Rng & Units 12/30/2021 12/18/2021   WBC      3.4 - 10.8 x10E3/uL  5.2   RBC      3.77 - 5.28 x10E6/uL  4.37   Hemoglobin      11.1 - 15.9 g/dL  12.0   Hematocrit      34.0 - 46.6 %  39.1   MCV      79 - 97 fL  90   MCH      26.6 - 33.0 pg  27.5   MCHC      31.5 - 35.7 g/dL  30.7 (L)   RDW      11.7 - 15.4 %  14.9   Platelets      150 - 450 x10E3/uL  297   Neutrophils      Not Estab. %  66   Lymph %      Not Estab. %  23   Mono %      Not Estab. %  8   Eosinophil %      Not Estab. %  3   Basophil %      Not Estab. %  0   Neutrophils, Abs      1.4 - 7.0 x10E3/uL  3.4   Lymph #      0.7 - 3.1 x10E3/uL  1.2   Mono #      0.1 - 0.9 x10E3/uL  0.4   Eos #      0.0 - 0.4 x10E3/uL  0.2   Baso #      0.0 - 0.2 x10E3/uL  0.0   Immature Granulocytes      Not Estab. %  0   Immature Grans (Abs)      0.0 - 0.1 x10E3/uL  0.0   Glucose      65 - 99 mg/dL  88   BUN      6 - 24 mg/dL  18   Creatinine      0.57 - 1.00 mg/dL  0.70   eGFR if non African American      >59 mL/min/1.73  99   eGFR if African American      >59 mL/min/1.73  114   BUN/CREAT RATIO      9 - 23  26 (H)   Sodium      134 - 144 mmol/L  139   Potassium      3.5 - 5.2 mmol/L  4.5   Chloride      96 - 106 mmol/L  104   CO2      20 - 29 mmol/L  22   Calcium      8.7 - 10.2 mg/dL  9.2   PROTEIN TOTAL      6.0 - 8.5 g/dL  6.9   Albumin      3.8 - 4.9 g/dL  3.9   Globulin, Total      1.5 - 4.5 g/dL  3.0   Albumin/Globulin Ratio      1.2 - 2.2  1.3   BILIRUBIN TOTAL      0.0 - 1.2 mg/dL  0.3   Alkaline Phosphatase      44 - 121 IU/L  67   AST      0 - 40 IU/L  25   ALT      0 - 32 IU/L  34 (H)    Cholesterol      100 - 199 mg/dL 211 (H)    Triglycerides      0 - 149 mg/dL 42    HDL      >39 mg/dL 80    VLDL Cholesterol Ankit      5 - 40 mg/dL 8    LDL Calculated      0 - 99 mg/dL 123 (H)    CRP      0 - 10 mg/L  2   Sed Rate      0 - 40 mm/hr  31   TSH      0.450 - 4.500 uIU/mL 1.940    Hemoglobin A1C External      4.8 - 5.6 % 5.8 (H)         Assessment:       1. RA. Failed Humira and Orencia.  She is on Plaquenil, methotrexate and SSZ.  Doing well.  2. 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 Rash and Positive WILMA/SSA/ Matthew.  Previously +DS DNA and normal complements.  3. Alopecia.  Hair growing still.  4. Leg edema bilateral after starting Norvasc  5. Diarrhea with medications.  With noncoated SSZ  6. Obesity  7. Osteoporosis prophylaxis.  8. HTN.  Control.  Patient to follow to primary doctor  9. Right arm lesion.  Similar lesion treated with a foam by dermatology  10.  Vitamin D deficiency  11.  Back pain  12.  Anemia   13.  Pre-diabetes        Plan:       1.  Labs  2.  Continue MTX 25 mg qweek, SSZ and plaquenil.  Continue to hold prednisone.     3.  Consider Xeljanz.  Hold on Rituxan for now.    4.  Follow up with Dr. Martines in dermatology  5.  Calcium 1,000 mg daily and vitamin D3 5,000 units daily.    6. Plaquenil exam normal 6/2021  7.  Voltaren gel 4 g knee qid  8.  Had flu and COVID vaccine booster.  Later will need shingle and pneumovax 23 with primary.  9.  Discussed NOOM      RTO 4 months/prn

## 2022-02-11 RX ORDER — HYDROCHLOROTHIAZIDE 25 MG/1
TABLET ORAL
Qty: 90 TABLET | Refills: 2 | OUTPATIENT
Start: 2022-02-11

## 2022-02-23 DIAGNOSIS — D84.9 IMMUNOSUPPRESSED STATUS: ICD-10-CM

## 2022-03-14 DIAGNOSIS — M05.79 RHEUMATOID ARTHRITIS INVOLVING MULTIPLE SITES WITH POSITIVE RHEUMATOID FACTOR: ICD-10-CM

## 2022-03-15 LAB
ALBUMIN SERPL-MCNC: 4.2 G/DL (ref 3.8–4.9)
ALBUMIN/GLOB SERPL: 1.2 {RATIO} (ref 1.2–2.2)
ALP SERPL-CCNC: 68 IU/L (ref 44–121)
ALT SERPL-CCNC: 14 IU/L (ref 0–32)
AST SERPL-CCNC: 9 IU/L (ref 0–40)
BASOPHILS # BLD AUTO: 0 X10E3/UL (ref 0–0.2)
BASOPHILS NFR BLD AUTO: 0 %
BILIRUB SERPL-MCNC: 0.3 MG/DL (ref 0–1.2)
BUN SERPL-MCNC: 15 MG/DL (ref 6–24)
BUN/CREAT SERPL: 19 (ref 9–23)
CALCIUM SERPL-MCNC: 9.8 MG/DL (ref 8.7–10.2)
CHLORIDE SERPL-SCNC: 103 MMOL/L (ref 96–106)
CO2 SERPL-SCNC: 20 MMOL/L (ref 20–29)
CREAT SERPL-MCNC: 0.78 MG/DL (ref 0.57–1)
CRP SERPL-MCNC: 1 MG/L (ref 0–10)
EOSINOPHIL # BLD AUTO: 0.1 X10E3/UL (ref 0–0.4)
EOSINOPHIL NFR BLD AUTO: 2 %
ERYTHROCYTE [DISTWIDTH] IN BLOOD BY AUTOMATED COUNT: 14.6 % (ref 11.7–15.4)
ERYTHROCYTE [SEDIMENTATION RATE] IN BLOOD BY WESTERGREN METHOD: 8 MM/HR (ref 0–40)
EST. GFR  (NO RACE VARIABLE): 91 ML/MIN/1.73
GLOBULIN SER CALC-MCNC: 3.5 G/DL (ref 1.5–4.5)
GLUCOSE SERPL-MCNC: 89 MG/DL (ref 65–99)
HCT VFR BLD AUTO: 38 % (ref 34–46.6)
HGB BLD-MCNC: 12 G/DL (ref 11.1–15.9)
IMM GRANULOCYTES # BLD AUTO: 0 X10E3/UL (ref 0–0.1)
IMM GRANULOCYTES NFR BLD AUTO: 0 %
LYMPHOCYTES # BLD AUTO: 1 X10E3/UL (ref 0.7–3.1)
LYMPHOCYTES NFR BLD AUTO: 19 %
MCH RBC QN AUTO: 28.6 PG (ref 26.6–33)
MCHC RBC AUTO-ENTMCNC: 31.6 G/DL (ref 31.5–35.7)
MCV RBC AUTO: 91 FL (ref 79–97)
MONOCYTES # BLD AUTO: 0.5 X10E3/UL (ref 0.1–0.9)
MONOCYTES NFR BLD AUTO: 9 %
NEUTROPHILS # BLD AUTO: 3.6 X10E3/UL (ref 1.4–7)
NEUTROPHILS NFR BLD AUTO: 70 %
PLATELET # BLD AUTO: 278 X10E3/UL (ref 150–450)
POTASSIUM SERPL-SCNC: 4.5 MMOL/L (ref 3.5–5.2)
PROT SERPL-MCNC: 7.7 G/DL (ref 6–8.5)
RBC # BLD AUTO: 4.2 X10E6/UL (ref 3.77–5.28)
SODIUM SERPL-SCNC: 139 MMOL/L (ref 134–144)
WBC # BLD AUTO: 5.2 X10E3/UL (ref 3.4–10.8)

## 2022-03-15 RX ORDER — METHOTREXATE 2.5 MG/1
TABLET ORAL
Qty: 40 TABLET | Refills: 2 | Status: SHIPPED | OUTPATIENT
Start: 2022-03-15 | End: 2022-05-11

## 2022-04-08 ENCOUNTER — PATIENT MESSAGE (OUTPATIENT)
Dept: ADMINISTRATIVE | Facility: OTHER | Age: 52
End: 2022-04-08
Payer: COMMERCIAL

## 2022-05-11 DIAGNOSIS — M05.79 RHEUMATOID ARTHRITIS INVOLVING MULTIPLE SITES WITH POSITIVE RHEUMATOID FACTOR: ICD-10-CM

## 2022-05-11 RX ORDER — METHOTREXATE 2.5 MG/1
TABLET ORAL
Qty: 40 TABLET | Refills: 2 | Status: SHIPPED | OUTPATIENT
Start: 2022-05-11 | End: 2022-09-20 | Stop reason: SDUPTHER

## 2022-05-12 DIAGNOSIS — I10 ESSENTIAL HYPERTENSION: ICD-10-CM

## 2022-05-12 RX ORDER — AMLODIPINE BESYLATE 5 MG/1
5 TABLET ORAL DAILY
Qty: 90 TABLET | Refills: 2 | Status: SHIPPED | OUTPATIENT
Start: 2022-05-12 | End: 2023-01-26

## 2022-05-12 NOTE — TELEPHONE ENCOUNTER
No new care gaps identified.  [facility] Embedded Care Gaps. Reference number: [id number]. 5/12/2022   9:42:10 AM CDT

## 2022-05-12 NOTE — TELEPHONE ENCOUNTER
Refill Authorization Note   Antonio Ruiz  is requesting a refill authorization.  Brief Assessment and Rationale for Refill:  Approve     Medication Therapy Plan:       Medication Reconciliation Completed: No   Comments:     No Care Gaps recommended.     Note composed:12:13 PM 05/12/2022

## 2022-05-12 NOTE — TELEPHONE ENCOUNTER
Faxed Prescription Refill from  [facility]    For Amlodipine 5 mg daily.  LOV     12/22/2021                                        Thank you

## 2022-06-03 DIAGNOSIS — M05.79 RHEUMATOID ARTHRITIS INVOLVING MULTIPLE SITES WITH POSITIVE RHEUMATOID FACTOR: ICD-10-CM

## 2022-06-03 RX ORDER — SULFASALAZINE 500 MG/1
1500 TABLET ORAL 2 TIMES DAILY
Qty: 540 TABLET | Refills: 0 | Status: SHIPPED | OUTPATIENT
Start: 2022-06-03 | End: 2022-11-01 | Stop reason: SDUPTHER

## 2022-06-17 ENCOUNTER — PATIENT MESSAGE (OUTPATIENT)
Dept: RHEUMATOLOGY | Facility: CLINIC | Age: 52
End: 2022-06-17
Payer: COMMERCIAL

## 2022-06-18 LAB
ALBUMIN SERPL-MCNC: 4.1 G/DL (ref 3.8–4.9)
ALBUMIN/GLOB SERPL: 1.2 {RATIO} (ref 1.2–2.2)
ALP SERPL-CCNC: 68 IU/L (ref 44–121)
ALT SERPL-CCNC: 10 IU/L (ref 0–32)
AST SERPL-CCNC: 12 IU/L (ref 0–40)
BASOPHILS # BLD AUTO: 0 X10E3/UL (ref 0–0.2)
BASOPHILS NFR BLD AUTO: 0 %
BILIRUB SERPL-MCNC: 0.2 MG/DL (ref 0–1.2)
BUN SERPL-MCNC: 16 MG/DL (ref 6–24)
BUN/CREAT SERPL: 25 (ref 9–23)
CALCIUM SERPL-MCNC: 9.2 MG/DL (ref 8.7–10.2)
CHLORIDE SERPL-SCNC: 101 MMOL/L (ref 96–106)
CO2 SERPL-SCNC: 22 MMOL/L (ref 20–29)
CREAT SERPL-MCNC: 0.65 MG/DL (ref 0.57–1)
CRP SERPL-MCNC: 2 MG/L (ref 0–10)
EOSINOPHIL # BLD AUTO: 0.1 X10E3/UL (ref 0–0.4)
EOSINOPHIL NFR BLD AUTO: 2 %
ERYTHROCYTE [DISTWIDTH] IN BLOOD BY AUTOMATED COUNT: 13.7 % (ref 11.7–15.4)
ERYTHROCYTE [SEDIMENTATION RATE] IN BLOOD BY WESTERGREN METHOD: 23 MM/HR (ref 0–40)
EST. GFR  (NO RACE VARIABLE): 105 ML/MIN/1.73
GLOBULIN SER CALC-MCNC: 3.3 G/DL (ref 1.5–4.5)
GLUCOSE SERPL-MCNC: 84 MG/DL (ref 65–99)
HCT VFR BLD AUTO: 38.6 % (ref 34–46.6)
HGB BLD-MCNC: 12.4 G/DL (ref 11.1–15.9)
IMM GRANULOCYTES # BLD AUTO: 0 X10E3/UL (ref 0–0.1)
IMM GRANULOCYTES NFR BLD AUTO: 0 %
LYMPHOCYTES # BLD AUTO: 0.8 X10E3/UL (ref 0.7–3.1)
LYMPHOCYTES NFR BLD AUTO: 19 %
MCH RBC QN AUTO: 28.8 PG (ref 26.6–33)
MCHC RBC AUTO-ENTMCNC: 32.1 G/DL (ref 31.5–35.7)
MCV RBC AUTO: 90 FL (ref 79–97)
MONOCYTES # BLD AUTO: 0.3 X10E3/UL (ref 0.1–0.9)
MONOCYTES NFR BLD AUTO: 6 %
NEUTROPHILS # BLD AUTO: 3.2 X10E3/UL (ref 1.4–7)
NEUTROPHILS NFR BLD AUTO: 73 %
PLATELET # BLD AUTO: 228 X10E3/UL (ref 150–450)
POTASSIUM SERPL-SCNC: 4.1 MMOL/L (ref 3.5–5.2)
PROT SERPL-MCNC: 7.4 G/DL (ref 6–8.5)
RBC # BLD AUTO: 4.31 X10E6/UL (ref 3.77–5.28)
SODIUM SERPL-SCNC: 137 MMOL/L (ref 134–144)
WBC # BLD AUTO: 4.4 X10E3/UL (ref 3.4–10.8)

## 2022-06-21 ENCOUNTER — OFFICE VISIT (OUTPATIENT)
Dept: RHEUMATOLOGY | Facility: CLINIC | Age: 52
End: 2022-06-21
Payer: COMMERCIAL

## 2022-06-21 VITALS
HEIGHT: 63 IN | BODY MASS INDEX: 45.7 KG/M2 | SYSTOLIC BLOOD PRESSURE: 132 MMHG | DIASTOLIC BLOOD PRESSURE: 73 MMHG | WEIGHT: 257.94 LBS | HEART RATE: 53 BPM

## 2022-06-21 DIAGNOSIS — M05.79 RHEUMATOID ARTHRITIS INVOLVING MULTIPLE SITES WITH POSITIVE RHEUMATOID FACTOR: Primary | ICD-10-CM

## 2022-06-21 DIAGNOSIS — D84.9 IMMUNOSUPPRESSION: ICD-10-CM

## 2022-06-21 DIAGNOSIS — I10 ESSENTIAL HYPERTENSION: ICD-10-CM

## 2022-06-21 PROCEDURE — 99214 OFFICE O/P EST MOD 30 MIN: CPT | Mod: S$GLB,,, | Performed by: INTERNAL MEDICINE

## 2022-06-21 PROCEDURE — 99214 PR OFFICE/OUTPT VISIT, EST, LEVL IV, 30-39 MIN: ICD-10-PCS | Mod: S$GLB,,, | Performed by: INTERNAL MEDICINE

## 2022-06-21 PROCEDURE — 1159F MED LIST DOCD IN RCRD: CPT | Mod: CPTII,S$GLB,, | Performed by: INTERNAL MEDICINE

## 2022-06-21 PROCEDURE — 3075F SYST BP GE 130 - 139MM HG: CPT | Mod: CPTII,S$GLB,, | Performed by: INTERNAL MEDICINE

## 2022-06-21 PROCEDURE — 4010F PR ACE/ARB THEARPY RXD/TAKEN: ICD-10-PCS | Mod: CPTII,S$GLB,, | Performed by: INTERNAL MEDICINE

## 2022-06-21 PROCEDURE — 1159F PR MEDICATION LIST DOCUMENTED IN MEDICAL RECORD: ICD-10-PCS | Mod: CPTII,S$GLB,, | Performed by: INTERNAL MEDICINE

## 2022-06-21 PROCEDURE — 1160F PR REVIEW ALL MEDS BY PRESCRIBER/CLIN PHARMACIST DOCUMENTED: ICD-10-PCS | Mod: CPTII,S$GLB,, | Performed by: INTERNAL MEDICINE

## 2022-06-21 PROCEDURE — 3008F PR BODY MASS INDEX (BMI) DOCUMENTED: ICD-10-PCS | Mod: CPTII,S$GLB,, | Performed by: INTERNAL MEDICINE

## 2022-06-21 PROCEDURE — 3008F BODY MASS INDEX DOCD: CPT | Mod: CPTII,S$GLB,, | Performed by: INTERNAL MEDICINE

## 2022-06-21 PROCEDURE — 3078F PR MOST RECENT DIASTOLIC BLOOD PRESSURE < 80 MM HG: ICD-10-PCS | Mod: CPTII,S$GLB,, | Performed by: INTERNAL MEDICINE

## 2022-06-21 PROCEDURE — 4010F ACE/ARB THERAPY RXD/TAKEN: CPT | Mod: CPTII,S$GLB,, | Performed by: INTERNAL MEDICINE

## 2022-06-21 PROCEDURE — 99999 PR PBB SHADOW E&M-EST. PATIENT-LVL IV: ICD-10-PCS | Mod: PBBFAC,,, | Performed by: INTERNAL MEDICINE

## 2022-06-21 PROCEDURE — 1160F RVW MEDS BY RX/DR IN RCRD: CPT | Mod: CPTII,S$GLB,, | Performed by: INTERNAL MEDICINE

## 2022-06-21 PROCEDURE — 99999 PR PBB SHADOW E&M-EST. PATIENT-LVL IV: CPT | Mod: PBBFAC,,, | Performed by: INTERNAL MEDICINE

## 2022-06-21 PROCEDURE — 3078F DIAST BP <80 MM HG: CPT | Mod: CPTII,S$GLB,, | Performed by: INTERNAL MEDICINE

## 2022-06-21 PROCEDURE — 3075F PR MOST RECENT SYSTOLIC BLOOD PRESS GE 130-139MM HG: ICD-10-PCS | Mod: CPTII,S$GLB,, | Performed by: INTERNAL MEDICINE

## 2022-06-21 RX ORDER — FOLIC ACID 1 MG/1
2000 TABLET ORAL DAILY
Qty: 180 TABLET | Refills: 2 | Status: SHIPPED | OUTPATIENT
Start: 2022-06-21 | End: 2023-02-06 | Stop reason: SDUPTHER

## 2022-06-21 NOTE — TELEPHONE ENCOUNTER
No new care gaps identified.  [facility] Embedded Care Gaps. Reference number: [id number]. 6/21/2022   5:42:28 AM MARCELLET

## 2022-06-21 NOTE — PROGRESS NOTES
Rapid3 Question Responses and Scores 6/17/2022   MDHAQ Score 0.8   Psychologic Score 0   Pain Score 1.5   When you awakened in the morning OVER THE LAST WEEK, did you feel stiff? Yes   If Yes, please indicate the number of hours until you are as limber as you will be for the day 0.02   Fatigue Score 0   Global Health Score 0   RAPID3 Score 1.39     Answers for HPI/ROS submitted by the patient on 6/17/2022  fever: No  eye redness: No  mouth sores: No  headaches: No  shortness of breath: No  chest pain: No  trouble swallowing: No  diarrhea: No  constipation: No  unexpected weight change: No  genital sore: No  dysuria: No  During the last 3 days, have you had a skin rash?: No  Bruises or bleeds easily: No  cough: No

## 2022-06-21 NOTE — PROGRESS NOTES
"Subjective:       Patient ID: Antonio Ruiz is a 52 y.o. female.    Chief Complaint: Rheumatoid arthritis    HPI:  Antonio Ruiz is a 52 y.o. female with a history   of rheumatoid arthritis manifested by symmetrical small joint involvement,   history of elbow contracture on the right in the past, positive rheumatoid   factor, positive WILMA. Humira stopped due to rash and pain.  Orencia did not help.    The patient currently takes Methotrexate 25 mg weekly, folic acid daily, sulfasalazine 1500 mg twice a day and Plaquenil 200 mg bid.    Patient off prednisone.    Denies pain except plantar fascitis.  Pain 0/10 ache.   No morning stiffness.          Review of Systems   Constitutional: Positive for fatigue. Negative for fever and unexpected weight change.   HENT: Negative.  Negative for mouth sores and trouble swallowing.    Eyes: Negative.  Negative for redness.   Respiratory: Negative for cough and shortness of breath.    Cardiovascular: Negative.  Negative for chest pain.   Gastrointestinal: Negative.  Negative for constipation and diarrhea.   Endocrine: Negative.    Genitourinary: Negative.  Negative for dysuria and genital sores.   Musculoskeletal: Positive for arthralgias.   Skin: Negative for rash.   Allergic/Immunologic: Negative.    Neurological: Negative.  Negative for headaches.   Hematological: Negative.  Does not bruise/bleed easily.   Psychiatric/Behavioral: Negative.          Objective:   /73   Pulse (!) 53   Ht 5' 3" (1.6 m)   Wt 117 kg (257 lb 15 oz)   LMP 11/01/2019   BMI 45.69 kg/m²      Physical Exam   Constitutional: She is oriented to person, place, and time.   HENT:   Head: Normocephalic and atraumatic.   Eyes: Conjunctivae are normal.   Cardiovascular: Normal rate, regular rhythm and normal heart sounds.   Pulmonary/Chest: Effort normal and breath sounds normal.   Abdominal: Soft. Bowel sounds are normal.   Musculoskeletal:         General: No tenderness or deformity.      " Cervical back: Neck supple.      Comments: 28 joint count: 0 swollen and 0 tender  No MTP tenderness bilaterally     Neurological: She is alert and oriented to person, place, and time. No cranial nerve deficit. Gait normal.   Skin: Skin is warm and dry.   Psychiatric: Mood and affect normal.     LABS    Component      Latest Ref Rng & Units 6/17/2022   WBC      3.4 - 10.8 x10E3/uL 4.4   RBC      3.77 - 5.28 x10E6/uL 4.31   Hemoglobin      11.1 - 15.9 g/dL 12.4   Hematocrit      34.0 - 46.6 % 38.6   MCV      79 - 97 fL 90   MCH      26.6 - 33.0 pg 28.8   MCHC      31.5 - 35.7 g/dL 32.1   RDW      11.7 - 15.4 % 13.7   Platelets      150 - 450 x10E3/uL 228   Neutrophils      Not Estab. % 73   Lymph %      Not Estab. % 19   Mono %      Not Estab. % 6   Eosinophil %      Not Estab. % 2   Basophil %      Not Estab. % 0   Neutrophils, Abs      1.4 - 7.0 x10E3/uL 3.2   Lymph #      0.7 - 3.1 x10E3/uL 0.8   Mono #      0.1 - 0.9 x10E3/uL 0.3   Eos #      0.0 - 0.4 x10E3/uL 0.1   Baso #      0.0 - 0.2 x10E3/uL 0.0   Immature Granulocytes      Not Estab. % 0   Immature Grans (Abs)      0.0 - 0.1 x10E3/uL 0.0   Glucose      65 - 99 mg/dL 84   BUN      6 - 24 mg/dL 16   Creatinine      0.57 - 1.00 mg/dL 0.65   eGFR no Race variable      >59 mL/min/1.73 105   BUN/CREAT RATIO      9 - 23 25 (H)   Sodium      134 - 144 mmol/L 137   Potassium      3.5 - 5.2 mmol/L 4.1   Chloride      96 - 106 mmol/L 101   CO2      20 - 29 mmol/L 22   Calcium      8.7 - 10.2 mg/dL 9.2   PROTEIN TOTAL      6.0 - 8.5 g/dL 7.4   Albumin      3.8 - 4.9 g/dL 4.1   Globulin, Total      1.5 - 4.5 g/dL 3.3   Albumin/Globulin Ratio      1.2 - 2.2 1.2   BILIRUBIN TOTAL      0.0 - 1.2 mg/dL 0.2   Alkaline Phosphatase      44 - 121 IU/L 68   AST      0 - 40 IU/L 12   ALT      0 - 32 IU/L 10   Sed Rate      0 - 40 mm/hr 23   CRP      0 - 10 mg/L 2        Assessment:       1. RA. Failed Humira and Orencia.  She is on Plaquenil, methotrexate and SSZ.  Doing  well.  2. 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 Rash and Positive WILMA/SSA/ Matthew.  Previously +DS DNA and normal complements.  3. Alopecia.  Hair growing still.  4. Leg edema bilateral after starting Norvasc  5. Diarrhea with medications.  With noncoated SSZ  6. Obesity  7. Osteoporosis prophylaxis.  8. HTN.  Control.  Patient to follow to primary doctor  9. Right arm lesion.  Similar lesion treated with a foam by dermatology  10.  Vitamin D deficiency  11.  Back pain  12.  Anemia   13.  Pre-diabetes  14.  Plantar fasciitis        Plan:       1.  Labs  2.  Continue MTX 25 mg qweek, SSZ and plaquenil.  Continue to hold prednisone.     3.  Consider Xeljanz.  Hold on Rituxan for now.    4.  Follow up with Dr. Martines in dermatology  5.  Calcium 1,000 mg daily and vitamin D3 5,000 units daily.    6. Plaquenil exam normal 12/28/2021  7.  Voltaren gel 4 g knee qid  8.  Had flu and COVID vaccine booster.  Later will need shingle and pneumovax 23 with primary.  9.  Discussed NOOM  10.  Discussed weight loss and exercise      RTO 6 months/prn

## 2022-06-22 DIAGNOSIS — I10 ESSENTIAL HYPERTENSION: ICD-10-CM

## 2022-06-22 RX ORDER — IRBESARTAN 300 MG/1
300 TABLET ORAL DAILY
Qty: 90 TABLET | Refills: 2 | OUTPATIENT
Start: 2022-06-22

## 2022-06-22 RX ORDER — IRBESARTAN 300 MG/1
300 TABLET ORAL DAILY
Qty: 90 TABLET | Refills: 1 | Status: SHIPPED | OUTPATIENT
Start: 2022-06-22 | End: 2022-10-28

## 2022-06-22 NOTE — TELEPHONE ENCOUNTER
Refill Decision Note   Merrittcykarinkalee Ruiz  is requesting a refill authorization.  Brief Assessment and Rationale for Refill:  Approve     Medication Therapy Plan:       Medication Reconciliation Completed: No   Comments:     No Care Gaps recommended.     Note composed:4:40 PM 06/22/2022

## 2022-06-22 NOTE — TELEPHONE ENCOUNTER
Refill Decision Note   Merrittcykarinkalee Ruiz  is requesting a refill authorization.  Brief Assessment and Rationale for Refill:  Quick Discontinue     Medication Therapy Plan:       Medication Reconciliation Completed: No   Comments:     No Care Gaps recommended.     Note composed:4:41 PM 06/22/2022

## 2022-06-22 NOTE — TELEPHONE ENCOUNTER
No new care gaps identified.  [facility] Embedded Care Gaps. Reference number: [id number]. 6/22/2022   7:39:00 AM CDT

## 2022-09-12 ENCOUNTER — PATIENT MESSAGE (OUTPATIENT)
Dept: ADMINISTRATIVE | Facility: HOSPITAL | Age: 52
End: 2022-09-12
Payer: COMMERCIAL

## 2022-09-12 ENCOUNTER — PATIENT OUTREACH (OUTPATIENT)
Dept: ADMINISTRATIVE | Facility: HOSPITAL | Age: 52
End: 2022-09-12
Payer: COMMERCIAL

## 2022-09-22 ENCOUNTER — PATIENT MESSAGE (OUTPATIENT)
Dept: RHEUMATOLOGY | Facility: CLINIC | Age: 52
End: 2022-09-22
Payer: COMMERCIAL

## 2022-09-22 LAB
ALBUMIN SERPL-MCNC: 4.1 G/DL (ref 3.8–4.9)
ALBUMIN/GLOB SERPL: 1.4 {RATIO} (ref 1.2–2.2)
ALP SERPL-CCNC: 67 IU/L (ref 44–121)
ALT SERPL-CCNC: 14 IU/L (ref 0–32)
AST SERPL-CCNC: 13 IU/L (ref 0–40)
BASOPHILS # BLD AUTO: 0 X10E3/UL (ref 0–0.2)
BASOPHILS NFR BLD AUTO: 1 %
BILIRUB SERPL-MCNC: 0.4 MG/DL (ref 0–1.2)
BUN SERPL-MCNC: 11 MG/DL (ref 6–24)
BUN/CREAT SERPL: 19 (ref 9–23)
CALCIUM SERPL-MCNC: 9.4 MG/DL (ref 8.7–10.2)
CHLORIDE SERPL-SCNC: 102 MMOL/L (ref 96–106)
CO2 SERPL-SCNC: 23 MMOL/L (ref 20–29)
CREAT SERPL-MCNC: 0.57 MG/DL (ref 0.57–1)
CRP SERPL-MCNC: 2 MG/L (ref 0–10)
EOSINOPHIL # BLD AUTO: 0.1 X10E3/UL (ref 0–0.4)
EOSINOPHIL NFR BLD AUTO: 2 %
ERYTHROCYTE [DISTWIDTH] IN BLOOD BY AUTOMATED COUNT: 15.1 % (ref 11.7–15.4)
ERYTHROCYTE [SEDIMENTATION RATE] IN BLOOD BY WESTERGREN METHOD: 29 MM/HR (ref 0–40)
EST. GFR  (NO RACE VARIABLE): 108 ML/MIN/1.73
GLOBULIN SER CALC-MCNC: 2.9 G/DL (ref 1.5–4.5)
GLUCOSE SERPL-MCNC: 96 MG/DL (ref 65–99)
HCT VFR BLD AUTO: 38.9 % (ref 34–46.6)
HGB BLD-MCNC: 12.1 G/DL (ref 11.1–15.9)
IMM GRANULOCYTES # BLD AUTO: 0 X10E3/UL (ref 0–0.1)
IMM GRANULOCYTES NFR BLD AUTO: 0 %
LYMPHOCYTES # BLD AUTO: 1.6 X10E3/UL (ref 0.7–3.1)
LYMPHOCYTES NFR BLD AUTO: 30 %
MCH RBC QN AUTO: 28.7 PG (ref 26.6–33)
MCHC RBC AUTO-ENTMCNC: 31.1 G/DL (ref 31.5–35.7)
MCV RBC AUTO: 92 FL (ref 79–97)
MONOCYTES # BLD AUTO: 0.5 X10E3/UL (ref 0.1–0.9)
MONOCYTES NFR BLD AUTO: 9 %
NEUTROPHILS # BLD AUTO: 3 X10E3/UL (ref 1.4–7)
NEUTROPHILS NFR BLD AUTO: 58 %
PLATELET # BLD AUTO: 316 X10E3/UL (ref 150–450)
POTASSIUM SERPL-SCNC: 4 MMOL/L (ref 3.5–5.2)
PROT SERPL-MCNC: 7 G/DL (ref 6–8.5)
RBC # BLD AUTO: 4.22 X10E6/UL (ref 3.77–5.28)
SODIUM SERPL-SCNC: 139 MMOL/L (ref 134–144)
WBC # BLD AUTO: 5.2 X10E3/UL (ref 3.4–10.8)

## 2022-10-06 ENCOUNTER — PATIENT MESSAGE (OUTPATIENT)
Dept: ADMINISTRATIVE | Facility: OTHER | Age: 52
End: 2022-10-06
Payer: COMMERCIAL

## 2022-10-19 ENCOUNTER — PATIENT MESSAGE (OUTPATIENT)
Dept: RHEUMATOLOGY | Facility: CLINIC | Age: 52
End: 2022-10-19
Payer: COMMERCIAL

## 2022-10-19 DIAGNOSIS — M05.79 RHEUMATOID ARTHRITIS INVOLVING MULTIPLE SITES WITH POSITIVE RHEUMATOID FACTOR: ICD-10-CM

## 2022-10-20 RX ORDER — METHOTREXATE 2.5 MG/1
TABLET ORAL
Qty: 40 TABLET | Refills: 2 | Status: SHIPPED | OUTPATIENT
Start: 2022-10-20 | End: 2023-02-06 | Stop reason: SDUPTHER

## 2022-10-28 DIAGNOSIS — I10 ESSENTIAL HYPERTENSION: ICD-10-CM

## 2022-10-28 RX ORDER — HYDROCHLOROTHIAZIDE 25 MG/1
25 TABLET ORAL DAILY
Qty: 90 TABLET | Refills: 0 | Status: SHIPPED | OUTPATIENT
Start: 2022-10-28 | End: 2023-03-15 | Stop reason: SDUPTHER

## 2022-10-28 RX ORDER — IRBESARTAN 300 MG/1
300 TABLET ORAL DAILY
Qty: 90 TABLET | Refills: 0 | Status: SHIPPED | OUTPATIENT
Start: 2022-10-28 | End: 2023-03-15 | Stop reason: SDUPTHER

## 2022-10-28 NOTE — TELEPHONE ENCOUNTER
Care Due:                  Date            Visit Type   Department     Provider  --------------------------------------------------------------------------------                                MYCHART                              FOLLOWUP/OF  [facility] INTERNAL  Last Visit: 12-      JASMYNE VISIT   [facility]       Emeka Mari  Next Visit: None Scheduled  None         None Found                                                            Last  Test          Frequency    Reason                     Performed    Due Date  --------------------------------------------------------------------------------    Office Visit  12 months..  amLODIPine,                12- 12-                             hydroCHLOROthiazide,                             irbesartan...............    [employer] Embedded Care Gaps. Reference number: [id number]. 10/28/2022   5:50:16 AM CDT

## 2022-10-28 NOTE — TELEPHONE ENCOUNTER
Refill Decision Note   Antonio Ruiz  is requesting a refill authorization.  Brief Assessment and Rationale for Refill:  Approve    -Medication-Related Problems Identified: Requires appointment  Medication Therapy Plan:  Due for annual with PCP    Medication Reconciliation Completed: No   Comments:     Provider Staff:     Action is required for this patient.   Please see care gap opportunities below in Care Due Message.     Thanks!  [facility]     Appointments      Date Provider   Last Visit   12/22/2021 Emeka Mari MD   Next Visit   Visit date not found Emeka Mari MD     Note composed:12:44 PM 10/28/2022           Note composed:12:44 PM 10/28/2022

## 2022-12-19 LAB
ALBUMIN SERPL-MCNC: 4.3 G/DL (ref 3.8–4.9)
ALBUMIN/GLOB SERPL: 1.5 {RATIO} (ref 1.2–2.2)
ALP SERPL-CCNC: 75 IU/L (ref 44–121)
ALT SERPL-CCNC: 9 IU/L (ref 0–32)
AST SERPL-CCNC: 10 IU/L (ref 0–40)
BASOPHILS # BLD AUTO: 0 X10E3/UL (ref 0–0.2)
BASOPHILS NFR BLD AUTO: 0 %
BILIRUB SERPL-MCNC: <0.2 MG/DL (ref 0–1.2)
BUN SERPL-MCNC: 17 MG/DL (ref 6–24)
BUN/CREAT SERPL: 23 (ref 9–23)
CALCIUM SERPL-MCNC: 9.4 MG/DL (ref 8.7–10.2)
CHLORIDE SERPL-SCNC: 105 MMOL/L (ref 96–106)
CO2 SERPL-SCNC: 21 MMOL/L (ref 20–29)
CREAT SERPL-MCNC: 0.75 MG/DL (ref 0.57–1)
CRP SERPL-MCNC: 3 MG/L (ref 0–10)
EOSINOPHIL # BLD AUTO: 0.2 X10E3/UL (ref 0–0.4)
EOSINOPHIL NFR BLD AUTO: 3 %
ERYTHROCYTE [DISTWIDTH] IN BLOOD BY AUTOMATED COUNT: 13.9 % (ref 11.7–15.4)
ERYTHROCYTE [SEDIMENTATION RATE] IN BLOOD BY WESTERGREN METHOD: 36 MM/HR (ref 0–40)
EST. GFR  (NO RACE VARIABLE): 95 ML/MIN/1.73
GLOBULIN SER CALC-MCNC: 2.9 G/DL (ref 1.5–4.5)
GLUCOSE SERPL-MCNC: 92 MG/DL (ref 70–99)
HCT VFR BLD AUTO: 35.6 % (ref 34–46.6)
HGB BLD-MCNC: 12.1 G/DL (ref 11.1–15.9)
IMM GRANULOCYTES # BLD AUTO: 0 X10E3/UL (ref 0–0.1)
IMM GRANULOCYTES NFR BLD AUTO: 0 %
LYMPHOCYTES # BLD AUTO: 1.1 X10E3/UL (ref 0.7–3.1)
LYMPHOCYTES NFR BLD AUTO: 23 %
MCH RBC QN AUTO: 29.6 PG (ref 26.6–33)
MCHC RBC AUTO-ENTMCNC: 34 G/DL (ref 31.5–35.7)
MCV RBC AUTO: 87 FL (ref 79–97)
MONOCYTES # BLD AUTO: 0.5 X10E3/UL (ref 0.1–0.9)
MONOCYTES NFR BLD AUTO: 11 %
NEUTROPHILS # BLD AUTO: 3 X10E3/UL (ref 1.4–7)
NEUTROPHILS NFR BLD AUTO: 63 %
PLATELET # BLD AUTO: 285 X10E3/UL (ref 150–450)
POTASSIUM SERPL-SCNC: 4 MMOL/L (ref 3.5–5.2)
PROT SERPL-MCNC: 7.2 G/DL (ref 6–8.5)
RBC # BLD AUTO: 4.09 X10E6/UL (ref 3.77–5.28)
SODIUM SERPL-SCNC: 141 MMOL/L (ref 134–144)
WBC # BLD AUTO: 4.9 X10E3/UL (ref 3.4–10.8)

## 2022-12-20 ENCOUNTER — PATIENT MESSAGE (OUTPATIENT)
Dept: RHEUMATOLOGY | Facility: CLINIC | Age: 52
End: 2022-12-20
Payer: COMMERCIAL

## 2022-12-28 ENCOUNTER — PATIENT OUTREACH (OUTPATIENT)
Dept: ADMINISTRATIVE | Facility: HOSPITAL | Age: 52
End: 2022-12-28
Payer: COMMERCIAL

## 2023-01-10 ENCOUNTER — OFFICE VISIT (OUTPATIENT)
Dept: RHEUMATOLOGY | Facility: CLINIC | Age: 53
End: 2023-01-10
Payer: COMMERCIAL

## 2023-01-10 VITALS
BODY MASS INDEX: 46.17 KG/M2 | WEIGHT: 260.56 LBS | HEIGHT: 63 IN | SYSTOLIC BLOOD PRESSURE: 117 MMHG | HEART RATE: 82 BPM | DIASTOLIC BLOOD PRESSURE: 72 MMHG

## 2023-01-10 DIAGNOSIS — I10 PRIMARY HYPERTENSION: ICD-10-CM

## 2023-01-10 DIAGNOSIS — D84.9 IMMUNOSUPPRESSION: ICD-10-CM

## 2023-01-10 DIAGNOSIS — M05.79 RHEUMATOID ARTHRITIS INVOLVING MULTIPLE SITES WITH POSITIVE RHEUMATOID FACTOR: Primary | ICD-10-CM

## 2023-01-10 DIAGNOSIS — E66.01 CLASS 3 SEVERE OBESITY WITH SERIOUS COMORBIDITY AND BODY MASS INDEX (BMI) OF 45.0 TO 49.9 IN ADULT, UNSPECIFIED OBESITY TYPE: ICD-10-CM

## 2023-01-10 PROCEDURE — 1159F PR MEDICATION LIST DOCUMENTED IN MEDICAL RECORD: ICD-10-PCS | Mod: CPTII,S$GLB,, | Performed by: INTERNAL MEDICINE

## 2023-01-10 PROCEDURE — 99214 OFFICE O/P EST MOD 30 MIN: CPT | Mod: S$GLB,,, | Performed by: INTERNAL MEDICINE

## 2023-01-10 PROCEDURE — 3074F SYST BP LT 130 MM HG: CPT | Mod: CPTII,S$GLB,, | Performed by: INTERNAL MEDICINE

## 2023-01-10 PROCEDURE — 4010F ACE/ARB THERAPY RXD/TAKEN: CPT | Mod: CPTII,S$GLB,, | Performed by: INTERNAL MEDICINE

## 2023-01-10 PROCEDURE — 4010F PR ACE/ARB THEARPY RXD/TAKEN: ICD-10-PCS | Mod: CPTII,S$GLB,, | Performed by: INTERNAL MEDICINE

## 2023-01-10 PROCEDURE — 3008F PR BODY MASS INDEX (BMI) DOCUMENTED: ICD-10-PCS | Mod: CPTII,S$GLB,, | Performed by: INTERNAL MEDICINE

## 2023-01-10 PROCEDURE — 99999 PR PBB SHADOW E&M-EST. PATIENT-LVL IV: ICD-10-PCS | Mod: PBBFAC,,, | Performed by: INTERNAL MEDICINE

## 2023-01-10 PROCEDURE — 1160F RVW MEDS BY RX/DR IN RCRD: CPT | Mod: CPTII,S$GLB,, | Performed by: INTERNAL MEDICINE

## 2023-01-10 PROCEDURE — 1159F MED LIST DOCD IN RCRD: CPT | Mod: CPTII,S$GLB,, | Performed by: INTERNAL MEDICINE

## 2023-01-10 PROCEDURE — 99214 PR OFFICE/OUTPT VISIT, EST, LEVL IV, 30-39 MIN: ICD-10-PCS | Mod: S$GLB,,, | Performed by: INTERNAL MEDICINE

## 2023-01-10 PROCEDURE — 3078F DIAST BP <80 MM HG: CPT | Mod: CPTII,S$GLB,, | Performed by: INTERNAL MEDICINE

## 2023-01-10 PROCEDURE — 3078F PR MOST RECENT DIASTOLIC BLOOD PRESSURE < 80 MM HG: ICD-10-PCS | Mod: CPTII,S$GLB,, | Performed by: INTERNAL MEDICINE

## 2023-01-10 PROCEDURE — 3074F PR MOST RECENT SYSTOLIC BLOOD PRESSURE < 130 MM HG: ICD-10-PCS | Mod: CPTII,S$GLB,, | Performed by: INTERNAL MEDICINE

## 2023-01-10 PROCEDURE — 99999 PR PBB SHADOW E&M-EST. PATIENT-LVL IV: CPT | Mod: PBBFAC,,, | Performed by: INTERNAL MEDICINE

## 2023-01-10 PROCEDURE — 3008F BODY MASS INDEX DOCD: CPT | Mod: CPTII,S$GLB,, | Performed by: INTERNAL MEDICINE

## 2023-01-10 PROCEDURE — 1160F PR REVIEW ALL MEDS BY PRESCRIBER/CLIN PHARMACIST DOCUMENTED: ICD-10-PCS | Mod: CPTII,S$GLB,, | Performed by: INTERNAL MEDICINE

## 2023-01-10 ASSESSMENT — ROUTINE ASSESSMENT OF PATIENT INDEX DATA (RAPID3)
TOTAL RAPID3 SCORE: 1.22
PATIENT GLOBAL ASSESSMENT SCORE: 0
WHEN YOU AWAKENED IN THE MORNING OVER THE LAST WEEK, PLEASE INDICATE THE AMOUNT OF TIME IT TAKES UNTIL YOU ARE AS LIMBER AS YOU WILL BE FOR THE DAY: 1MIN
PSYCHOLOGICAL DISTRESS SCORE: 0
AM STIFFNESS SCORE: 1, YES
MDHAQ FUNCTION SCORE: 0.2
FATIGUE SCORE: 1
PAIN SCORE: 3

## 2023-01-10 NOTE — PROGRESS NOTES
"Subjective:       Patient ID: Antonio Ruiz is a 52 y.o. female.    Chief Complaint: Rheumatoid arthritis    HPI:  Antonio Ruiz is a 52 y.o. female with a history   of rheumatoid arthritis manifested by symmetrical small joint involvement,   history of elbow contracture on the right in the past, positive rheumatoid   factor, positive WILMA. Humira stopped due to rash and pain.  Orencia did not help.    The patient currently takes Methotrexate 25 mg weekly, folic acid daily, sulfasalazine 1500 mg twice a day and Plaquenil 200 mg bid.    Patient off prednisone.    Denies pain.  Pain 0/10 ache.   No morning stiffness.          Review of Systems   Constitutional:  Positive for fatigue. Negative for fever and unexpected weight change.   HENT: Negative.  Negative for mouth sores and trouble swallowing.    Eyes: Negative.  Negative for redness.   Respiratory:  Negative for cough and shortness of breath.    Cardiovascular: Negative.  Negative for chest pain.   Gastrointestinal: Negative.  Negative for constipation and diarrhea.   Endocrine: Negative.    Genitourinary: Negative.  Negative for dysuria and genital sores.   Musculoskeletal:  Positive for arthralgias.   Skin:  Negative for rash.   Allergic/Immunologic: Negative.    Neurological: Negative.  Negative for headaches.   Hematological: Negative.  Does not bruise/bleed easily.   Psychiatric/Behavioral: Negative.         Objective:   /72   Pulse 82   Ht 5' 3" (1.6 m)   Wt 118.2 kg (260 lb 9.3 oz)   LMP 11/01/2019   BMI 46.16 kg/m²      Physical Exam   Constitutional: She is oriented to person, place, and time.   HENT:   Head: Normocephalic and atraumatic.   Eyes: Conjunctivae are normal.   Cardiovascular: Normal rate, regular rhythm and normal heart sounds.   Pulmonary/Chest: Effort normal and breath sounds normal.   Abdominal: Soft. Bowel sounds are normal.   Musculoskeletal:         General: No tenderness or deformity.      Cervical back: Neck " supple.      Comments: 28 joint count: 0 swollen and 0 tender  No MTP tenderness bilaterally     Neurological: She is alert and oriented to person, place, and time. No cranial nerve deficit. Gait normal.   Skin: Skin is warm and dry.   Psychiatric: Mood and affect normal.     LABS    Component      Latest Ref Rng & Units 12/17/2022   WBC      3.4 - 10.8 x10E3/uL 4.9   RBC      3.77 - 5.28 x10E6/uL 4.09   Hemoglobin      11.1 - 15.9 g/dL 12.1   Hematocrit      34.0 - 46.6 % 35.6   MCV      79 - 97 fL 87   MCH      26.6 - 33.0 pg 29.6   MCHC      31.5 - 35.7 g/dL 34.0   RDW      11.7 - 15.4 % 13.9   Platelets      150 - 450 x10E3/uL 285   Neutrophils      Not Estab. % 63   Lymph %      Not Estab. % 23   Mono %      Not Estab. % 11   Eosinophil %      Not Estab. % 3   Basophil %      Not Estab. % 0   Neutrophils, Abs      1.4 - 7.0 x10E3/uL 3.0   Lymph #      0.7 - 3.1 x10E3/uL 1.1   Mono #      0.1 - 0.9 x10E3/uL 0.5   Eos #      0.0 - 0.4 x10E3/uL 0.2   Baso #      0.0 - 0.2 x10E3/uL 0.0   Immature Granulocytes      Not Estab. % 0   Immature Grans (Abs)      0.0 - 0.1 x10E3/uL 0.0   Glucose      70 - 99 mg/dL 92   BUN      6 - 24 mg/dL 17   Creatinine      0.57 - 1.00 mg/dL 0.75   eGFR      >59 mL/min/1.73 95   BUN/CREAT RATIO      9 - 23 23   Sodium      134 - 144 mmol/L 141   Potassium      3.5 - 5.2 mmol/L 4.0   Chloride      96 - 106 mmol/L 105   CO2      20 - 29 mmol/L 21   Calcium      8.7 - 10.2 mg/dL 9.4   PROTEIN TOTAL      6.0 - 8.5 g/dL 7.2   Albumin      3.8 - 4.9 g/dL 4.3   Globulin, Total      1.5 - 4.5 g/dL 2.9   Albumin/Globulin Ratio      1.2 - 2.2 1.5   BILIRUBIN TOTAL      0.0 - 1.2 mg/dL <0.2   Alkaline Phosphatase      44 - 121 IU/L 75   AST      0 - 40 IU/L 10   ALT      0 - 32 IU/L 9   Sed Rate      0 - 40 mm/hr 36   CRP      0 - 10 mg/L 3        Assessment:       1. RA. Failed Humira and Orencia.  On Plaquenil, methotrexate and SSZ.  Doing well.  2. 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 Rash  and Positive WILMA/SSA/ Matthew.  Previously +DS DNA and normal complements.  3. Alopecia.  Hair growing still.  4. Leg edema bilateral after starting Norvasc  5. Diarrhea with medications.  With noncoated SSZ  6. Obesity  7. Osteoporosis prophylaxis.  8. HTN.  Control.  Patient to follow to primary doctor  9. Right arm lesion.  Similar lesion treated with a foam by dermatology  10.  Vitamin D deficiency  11.  Back pain  12.  Anemia   13.  Pre-diabetes  14.  Plantar fasciitis        Plan:       1.  Labs  2.  Continue MTX 25 mg qweek, SSZ and plaquenil.  Continue to hold prednisone.     3.  Consider Xeljanz.  Hold on Rituxan for now.    4.  Follow up with Dr. Martines in dermatology  5.  Calcium 1,000 mg daily and vitamin D3 5,000 units daily.    6. Plaquenil exam normal 12/28/2021  7.  Voltaren gel 4 g knee qid  8.  Had flu and COVID vaccine booster.  Later will need shingle and pneumovax 23 with primary.  9.  Discussed NOOM  10.  Discussed weight loss and exercise        RTO 6 months/prn

## 2023-01-26 DIAGNOSIS — I10 ESSENTIAL HYPERTENSION: ICD-10-CM

## 2023-01-26 RX ORDER — AMLODIPINE BESYLATE 5 MG/1
TABLET ORAL
Qty: 90 TABLET | Refills: 0 | Status: SHIPPED | OUTPATIENT
Start: 2023-01-26 | End: 2023-06-19 | Stop reason: SDUPTHER

## 2023-01-26 NOTE — TELEPHONE ENCOUNTER
Care Due:                  Date            Visit Type   Department     Provider  --------------------------------------------------------------------------------                                MYCHART                              FOLLOWUP/OF  [facility] INTERNAL  Last Visit: 12-      JASMYNE VISIT   [facility]       Emeka Mari  Next Visit: None Scheduled  None         None Found                                                            Last  Test          Frequency    Reason                     Performed    Due Date  --------------------------------------------------------------------------------    Office Visit  12 months..  amLODIPine,                12- 12-                             hydroCHLOROthiazide,                             irbesartan...............    [employer] Embedded Care Gaps. Reference number: [id number]. 1/26/2023   5:48:34 AM CST

## 2023-01-26 NOTE — TELEPHONE ENCOUNTER
Refill Decision Note   Merrittcykarinkalee Joseph  is requesting a refill authorization.  Brief Assessment and Rationale for Refill:  Approve     Medication Therapy Plan:       Medication Reconciliation Completed: No   Comments:     No Care Gaps recommended.     Note composed:9:44 AM 01/26/2023

## 2023-01-30 DIAGNOSIS — M05.79 RHEUMATOID ARTHRITIS INVOLVING MULTIPLE SITES WITH POSITIVE RHEUMATOID FACTOR: ICD-10-CM

## 2023-01-30 RX ORDER — HYDROXYCHLOROQUINE SULFATE 200 MG/1
200 TABLET, FILM COATED ORAL 2 TIMES DAILY
Qty: 60 TABLET | Refills: 2 | Status: SHIPPED | OUTPATIENT
Start: 2023-01-30 | End: 2023-06-16 | Stop reason: SDUPTHER

## 2023-02-06 DIAGNOSIS — M05.79 RHEUMATOID ARTHRITIS INVOLVING MULTIPLE SITES WITH POSITIVE RHEUMATOID FACTOR: ICD-10-CM

## 2023-02-07 RX ORDER — FOLIC ACID 1 MG/1
2000 TABLET ORAL DAILY
Qty: 180 TABLET | Refills: 2 | Status: SHIPPED | OUTPATIENT
Start: 2023-02-07 | End: 2023-10-24

## 2023-02-07 RX ORDER — METHOTREXATE 2.5 MG/1
TABLET ORAL
Qty: 40 TABLET | Refills: 2 | Status: SHIPPED | OUTPATIENT
Start: 2023-02-07 | End: 2023-05-08 | Stop reason: SDUPTHER

## 2023-02-28 NOTE — PROGRESS NOTES
Digital Medicine: Clinician Follow-Up    Agrees to increase monitoring. Told HC she would resume routine monitoring at last outreach, she has provided one reading in the last 11 months.     States she will resume, thanks me for reminder.   States has all medications and is tolerating without concern.     The history is provided by the patient.      Review of patient's allergies indicates:   -- Humira [adalimumab] -- Rash    --  Humira caused drug-induced lupus  Follow-up reason(s): routine follow up.     Hypertension    Missing readings   Patient needs assistance troubleshooting: patient reminder needed.   Patient is not experiencing signs/symptoms of hypertension.       Last 5 Patient Entered Readings                Current 30 Day Average: 144/91  Recent Readings 2/3/2023 4/5/2022      SBP (mmHg) 144 135 142 127 122   DBP (mmHg) 91 87 84 80 82   Pulse 63 64 57 68 61                     Hypertension Medications               amLODIPine (NORVASC) 5 MG tablet TAKE 1 TABLET(5 MG) BY MOUTH EVERY DAY    hydroCHLOROthiazide (HYDRODIURIL) 25 MG tablet Take 1 tablet (25 mg total) by mouth once daily.    irbesartan (AVAPRO) 300 MG tablet Take 1 tablet (300 mg total) by mouth once daily.                           Medication Adherence  Medication adherence was assessed.        ASSESSMENT(S)  Patients BP average is 144/91 mmHg, which is above goal. Patient's BP goal is less than or equal to 130/80.       BP is uncontrolled. One reading in the last 11 months. Requesting routine monitoring.         Hypertension Plan  Additional monitoring needed.  Continue current therapy.     Patient verbalizes understanding.        There are no preventive care reminders to display for this patient.     Hypertension Medications               amLODIPine (NORVASC) 5 MG tablet TAKE 1 TABLET(5 MG) BY MOUTH EVERY DAY    hydroCHLOROthiazide (HYDRODIURIL) 25 MG tablet Take 1 tablet (25 mg total) by mouth once daily.    irbesartan (AVAPRO) 300 MG  tablet Take 1 tablet (300 mg total) by mouth once daily.

## 2023-03-27 LAB
ALBUMIN SERPL-MCNC: 4 G/DL (ref 3.8–4.9)
ALBUMIN/GLOB SERPL: 1.4 {RATIO} (ref 1.2–2.2)
ALP SERPL-CCNC: 65 IU/L (ref 44–121)
ALT SERPL-CCNC: 15 IU/L (ref 0–32)
AST SERPL-CCNC: 12 IU/L (ref 0–40)
BASOPHILS # BLD AUTO: 0 X10E3/UL (ref 0–0.2)
BASOPHILS NFR BLD AUTO: 0 %
BILIRUB SERPL-MCNC: 0.3 MG/DL (ref 0–1.2)
BUN SERPL-MCNC: 13 MG/DL (ref 6–24)
BUN/CREAT SERPL: 19 (ref 9–23)
CALCIUM SERPL-MCNC: 9.6 MG/DL (ref 8.7–10.2)
CHLORIDE SERPL-SCNC: 102 MMOL/L (ref 96–106)
CO2 SERPL-SCNC: 21 MMOL/L (ref 20–29)
CREAT SERPL-MCNC: 0.69 MG/DL (ref 0.57–1)
CRP SERPL-MCNC: 2 MG/L (ref 0–10)
EOSINOPHIL # BLD AUTO: 0.1 X10E3/UL (ref 0–0.4)
EOSINOPHIL NFR BLD AUTO: 2 %
ERYTHROCYTE [DISTWIDTH] IN BLOOD BY AUTOMATED COUNT: 14.5 % (ref 11.7–15.4)
ERYTHROCYTE [SEDIMENTATION RATE] IN BLOOD BY WESTERGREN METHOD: 33 MM/HR (ref 0–40)
EST. GFR  (NO RACE VARIABLE): 103 ML/MIN/1.73
GLOBULIN SER CALC-MCNC: 2.9 G/DL (ref 1.5–4.5)
GLUCOSE SERPL-MCNC: 87 MG/DL (ref 70–99)
HCT VFR BLD AUTO: 34.8 % (ref 34–46.6)
HGB BLD-MCNC: 11.4 G/DL (ref 11.1–15.9)
IMM GRANULOCYTES # BLD AUTO: 0 X10E3/UL (ref 0–0.1)
IMM GRANULOCYTES NFR BLD AUTO: 0 %
LYMPHOCYTES # BLD AUTO: 1 X10E3/UL (ref 0.7–3.1)
LYMPHOCYTES NFR BLD AUTO: 19 %
MCH RBC QN AUTO: 29.5 PG (ref 26.6–33)
MCHC RBC AUTO-ENTMCNC: 32.8 G/DL (ref 31.5–35.7)
MCV RBC AUTO: 90 FL (ref 79–97)
MONOCYTES # BLD AUTO: 0.5 X10E3/UL (ref 0.1–0.9)
MONOCYTES NFR BLD AUTO: 9 %
NEUTROPHILS # BLD AUTO: 3.7 X10E3/UL (ref 1.4–7)
NEUTROPHILS NFR BLD AUTO: 70 %
PLATELET # BLD AUTO: 279 X10E3/UL (ref 150–450)
POTASSIUM SERPL-SCNC: 4.1 MMOL/L (ref 3.5–5.2)
PROT SERPL-MCNC: 6.9 G/DL (ref 6–8.5)
RBC # BLD AUTO: 3.87 X10E6/UL (ref 3.77–5.28)
SODIUM SERPL-SCNC: 138 MMOL/L (ref 134–144)
WBC # BLD AUTO: 5.3 X10E3/UL (ref 3.4–10.8)

## 2023-03-29 ENCOUNTER — PATIENT MESSAGE (OUTPATIENT)
Dept: RHEUMATOLOGY | Facility: CLINIC | Age: 53
End: 2023-03-29
Payer: COMMERCIAL

## 2023-04-02 ENCOUNTER — PATIENT MESSAGE (OUTPATIENT)
Dept: RHEUMATOLOGY | Facility: CLINIC | Age: 53
End: 2023-04-02
Payer: COMMERCIAL

## 2023-04-10 ENCOUNTER — PATIENT MESSAGE (OUTPATIENT)
Dept: ADMINISTRATIVE | Facility: OTHER | Age: 53
End: 2023-04-10
Payer: COMMERCIAL

## 2023-04-12 ENCOUNTER — IMMUNIZATION (OUTPATIENT)
Dept: INTERNAL MEDICINE | Facility: CLINIC | Age: 53
End: 2023-04-12
Payer: COMMERCIAL

## 2023-04-12 DIAGNOSIS — Z23 NEED FOR VACCINATION: Primary | ICD-10-CM

## 2023-04-12 PROCEDURE — 91312 COVID-19, MRNA, LNP-S, BIVALENT BOOSTER, PF, 30 MCG/0.3 ML DOSE: ICD-10-PCS | Mod: S$GLB,,, | Performed by: INTERNAL MEDICINE

## 2023-04-12 PROCEDURE — 0124A COVID-19, MRNA, LNP-S, BIVALENT BOOSTER, PF, 30 MCG/0.3 ML DOSE: CPT | Mod: CV19,PBBFAC | Performed by: INTERNAL MEDICINE

## 2023-04-12 PROCEDURE — 91312 COVID-19, MRNA, LNP-S, BIVALENT BOOSTER, PF, 30 MCG/0.3 ML DOSE: CPT | Mod: S$GLB,,, | Performed by: INTERNAL MEDICINE

## 2023-05-07 DIAGNOSIS — M05.79 RHEUMATOID ARTHRITIS INVOLVING MULTIPLE SITES WITH POSITIVE RHEUMATOID FACTOR: ICD-10-CM

## 2023-05-08 ENCOUNTER — PATIENT MESSAGE (OUTPATIENT)
Dept: RHEUMATOLOGY | Facility: CLINIC | Age: 53
End: 2023-05-08
Payer: COMMERCIAL

## 2023-05-08 DIAGNOSIS — M05.79 RHEUMATOID ARTHRITIS INVOLVING MULTIPLE SITES WITH POSITIVE RHEUMATOID FACTOR: Primary | ICD-10-CM

## 2023-05-08 DIAGNOSIS — D84.821 IMMUNOSUPPRESSION DUE TO DRUG THERAPY: ICD-10-CM

## 2023-05-08 DIAGNOSIS — Z79.899 IMMUNOSUPPRESSION DUE TO DRUG THERAPY: ICD-10-CM

## 2023-05-08 DIAGNOSIS — D84.9 IMMUNOSUPPRESSION: ICD-10-CM

## 2023-05-08 DIAGNOSIS — M05.79 RHEUMATOID ARTHRITIS INVOLVING MULTIPLE SITES WITH POSITIVE RHEUMATOID FACTOR: ICD-10-CM

## 2023-05-09 RX ORDER — METHOTREXATE 2.5 MG/1
TABLET ORAL
Qty: 40 TABLET | Refills: 2 | Status: SHIPPED | OUTPATIENT
Start: 2023-05-09 | End: 2023-05-16 | Stop reason: SDUPTHER

## 2023-05-09 RX ORDER — METHOTREXATE 2.5 MG/1
TABLET ORAL
Qty: 40 TABLET | Refills: 2 | OUTPATIENT
Start: 2023-05-09

## 2023-05-16 DIAGNOSIS — M05.79 RHEUMATOID ARTHRITIS INVOLVING MULTIPLE SITES WITH POSITIVE RHEUMATOID FACTOR: ICD-10-CM

## 2023-05-17 RX ORDER — METHOTREXATE 2.5 MG/1
TABLET ORAL
Qty: 40 TABLET | Refills: 2 | Status: SHIPPED | OUTPATIENT
Start: 2023-05-17 | End: 2023-08-04 | Stop reason: SDUPTHER

## 2023-05-18 ENCOUNTER — OFFICE VISIT (OUTPATIENT)
Dept: INTERNAL MEDICINE | Facility: CLINIC | Age: 53
End: 2023-05-18
Attending: INTERNAL MEDICINE
Payer: COMMERCIAL

## 2023-05-18 VITALS
BODY MASS INDEX: 44.06 KG/M2 | DIASTOLIC BLOOD PRESSURE: 86 MMHG | HEIGHT: 63 IN | OXYGEN SATURATION: 98 % | WEIGHT: 248.69 LBS | HEART RATE: 61 BPM | SYSTOLIC BLOOD PRESSURE: 124 MMHG

## 2023-05-18 DIAGNOSIS — Z12.11 COLON CANCER SCREENING: ICD-10-CM

## 2023-05-18 DIAGNOSIS — I10 PRIMARY HYPERTENSION: ICD-10-CM

## 2023-05-18 DIAGNOSIS — Z80.3 FAMILY HISTORY OF BREAST CANCER: ICD-10-CM

## 2023-05-18 DIAGNOSIS — Z00.00 ANNUAL PHYSICAL EXAM: Primary | ICD-10-CM

## 2023-05-18 DIAGNOSIS — M05.79 RHEUMATOID ARTHRITIS INVOLVING MULTIPLE SITES WITH POSITIVE RHEUMATOID FACTOR: ICD-10-CM

## 2023-05-18 PROCEDURE — 1159F MED LIST DOCD IN RCRD: CPT | Mod: CPTII,S$GLB,, | Performed by: INTERNAL MEDICINE

## 2023-05-18 PROCEDURE — 1160F RVW MEDS BY RX/DR IN RCRD: CPT | Mod: CPTII,S$GLB,, | Performed by: INTERNAL MEDICINE

## 2023-05-18 PROCEDURE — 4010F PR ACE/ARB THEARPY RXD/TAKEN: ICD-10-PCS | Mod: CPTII,S$GLB,, | Performed by: INTERNAL MEDICINE

## 2023-05-18 PROCEDURE — 4010F ACE/ARB THERAPY RXD/TAKEN: CPT | Mod: CPTII,S$GLB,, | Performed by: INTERNAL MEDICINE

## 2023-05-18 PROCEDURE — 99396 PREV VISIT EST AGE 40-64: CPT | Mod: S$GLB,,, | Performed by: INTERNAL MEDICINE

## 2023-05-18 PROCEDURE — 3074F PR MOST RECENT SYSTOLIC BLOOD PRESSURE < 130 MM HG: ICD-10-PCS | Mod: CPTII,S$GLB,, | Performed by: INTERNAL MEDICINE

## 2023-05-18 PROCEDURE — 3008F BODY MASS INDEX DOCD: CPT | Mod: CPTII,S$GLB,, | Performed by: INTERNAL MEDICINE

## 2023-05-18 PROCEDURE — 3079F PR MOST RECENT DIASTOLIC BLOOD PRESSURE 80-89 MM HG: ICD-10-PCS | Mod: CPTII,S$GLB,, | Performed by: INTERNAL MEDICINE

## 2023-05-18 PROCEDURE — 99999 PR PBB SHADOW E&M-EST. PATIENT-LVL V: CPT | Mod: PBBFAC,,, | Performed by: INTERNAL MEDICINE

## 2023-05-18 PROCEDURE — 1159F PR MEDICATION LIST DOCUMENTED IN MEDICAL RECORD: ICD-10-PCS | Mod: CPTII,S$GLB,, | Performed by: INTERNAL MEDICINE

## 2023-05-18 PROCEDURE — 3008F PR BODY MASS INDEX (BMI) DOCUMENTED: ICD-10-PCS | Mod: CPTII,S$GLB,, | Performed by: INTERNAL MEDICINE

## 2023-05-18 PROCEDURE — 3079F DIAST BP 80-89 MM HG: CPT | Mod: CPTII,S$GLB,, | Performed by: INTERNAL MEDICINE

## 2023-05-18 PROCEDURE — 99999 PR PBB SHADOW E&M-EST. PATIENT-LVL V: ICD-10-PCS | Mod: PBBFAC,,, | Performed by: INTERNAL MEDICINE

## 2023-05-18 PROCEDURE — 3074F SYST BP LT 130 MM HG: CPT | Mod: CPTII,S$GLB,, | Performed by: INTERNAL MEDICINE

## 2023-05-18 PROCEDURE — 1160F PR REVIEW ALL MEDS BY PRESCRIBER/CLIN PHARMACIST DOCUMENTED: ICD-10-PCS | Mod: CPTII,S$GLB,, | Performed by: INTERNAL MEDICINE

## 2023-05-18 PROCEDURE — 99396 PR PREVENTIVE VISIT,EST,40-64: ICD-10-PCS | Mod: S$GLB,,, | Performed by: INTERNAL MEDICINE

## 2023-05-18 NOTE — PROGRESS NOTES
Subjective:       Patient ID: Antonio Ruiz is a 53 y.o. female.    Chief Complaint: Annual Exam, Weight Loss, and Sinus Problem    Here for annual exam     50 yo F with PMHx of RA, HTN, Anemia (mixed JUSTINO/AOCD)obesity,     Missed dose of medication due to pharmacy and had mild flare up of arthralgias, otherwise doing well and has no complaints today.    ###HTN ###  -12/22/21 Above goal. Did not take BP meds today. No exercise. BMI 44. Crowded airway on exam. Wakes several times nightly. Wakes herself snoring. HA 3 times a week. No migrainous features.         #### Rheumatoid Arthritis ####  of rheumatoid arthritis manifested by symmetrical small joint involvement, +RF, +WILMA   history of elbow contracture on the right in the past, Humira stopped due to rash and pain.  Orencia did not help.   The patient currently takes Methotrexate 20 mg weekly, folic acid daily, sulfasalazine 1500 mg twice a day and Plaquenil 200 mg bid.    Patient off prednisone.    ### obesity ###  05/18/23 : 44.05 kg/m²  01/10/23 : 46.16 kg/m²  06/21/22 : 45.69 kg/m²  02/07/22 : 44.35 kg/m²  01/19/22 : 44.16 kg/m²              Review of Systems   Constitutional:  Negative for chills, fatigue, fever and unexpected weight change.   HENT:  Negative for ear pain, hearing loss, postnasal drip, tinnitus, trouble swallowing and voice change.    Respiratory:  Negative for cough, chest tightness, shortness of breath and wheezing.    Cardiovascular:  Negative for chest pain, palpitations and leg swelling.   Gastrointestinal:  Negative for abdominal pain, blood in stool, diarrhea, nausea and vomiting.   Endocrine: Negative for polydipsia, polyphagia and polyuria.   Genitourinary:  Negative for difficulty urinating, dysuria, hematuria and vaginal bleeding.   Skin:  Negative for rash.   Allergic/Immunologic: Negative for food allergies.   Neurological:  Negative for dizziness, numbness and headaches.   Hematological:  Does not bruise/bleed easily.  "  Psychiatric/Behavioral:  The patient is not nervous/anxious.      Objective:      Vitals:    05/18/23 1520   BP: 124/86   Pulse: 61   SpO2: 98%   Weight: 112.8 kg (248 lb 10.9 oz)   Height: 5' 3" (1.6 m)      Physical Exam  Vitals and nursing note reviewed.   Constitutional:       General: She is not in acute distress.     Appearance: Normal appearance. She is well-developed.   HENT:      Head: Normocephalic and atraumatic.      Mouth/Throat:      Pharynx: No oropharyngeal exudate.   Eyes:      General: No scleral icterus.     Conjunctiva/sclera: Conjunctivae normal.      Pupils: Pupils are equal, round, and reactive to light.   Neck:      Thyroid: No thyromegaly.   Cardiovascular:      Rate and Rhythm: Normal rate and regular rhythm.      Heart sounds: Normal heart sounds. No murmur heard.  Pulmonary:      Effort: Pulmonary effort is normal.      Breath sounds: Normal breath sounds. No wheezing or rales.   Abdominal:      General: There is no distension.   Musculoskeletal:         General: No tenderness.   Lymphadenopathy:      Cervical: No cervical adenopathy.   Skin:     General: Skin is warm and dry.   Neurological:      Mental Status: She is alert and oriented to person, place, and time.   Psychiatric:         Behavior: Behavior normal.       Assessment:       1. Annual physical exam    2. Colon cancer screening    3. Family history of breast cancer    4. Rheumatoid arthritis involving multiple sites with positive rheumatoid factor    5. Primary hypertension        Plan:       Antonio was seen today for annual exam, weight loss and sinus problem.    Diagnoses and all orders for this visit:    Annual physical exam  -     Cancel: Hemoglobin A1C; Future  -     Cancel: Lipid Panel; Future  -     Cancel: TSH; Future  -     TSH; Future  -     TSH  -     Lipid Panel; Future  -     Lipid Panel  -     Hemoglobin A1C; Future  -     Hemoglobin A1C    Colon cancer screening  -     Cologuard Screening (Multitarget " Stool DNA); Future  -     Cologuard Screening (Multitarget Stool DNA)    Family history of breast cancer  -     Mammo Digital Screening Bilat w/ Dimitry; Future  -     Mammo Digital Screening Bilat w/ Dimitry    Rheumatoid arthritis involving multiple sites with positive rheumatoid factor   F/u rheum  Primary hypertension   Best ways to lower your blood pressure: Consume less than 2 grams of sodium in a day. Count it. Regular exercise: 30-45 minutes a day, 4-5 times a week, moderate paced walking. A diet that is predominately fruits and vegetables, low fat dairy, minimal saturated fat, whole grains (emphasis on whole), and weight loss. The more weight you lose the more benefit you get from each pound lost going forward.       RTC in 6 months or sooner dana Phillips MD  Internal Medicine-[facility]        Side effects of medication(s) were discussed in detail and patient voiced understanding.  Patient will call back for any issues or complications.

## 2023-05-25 ENCOUNTER — PATIENT MESSAGE (OUTPATIENT)
Dept: INTERNAL MEDICINE | Facility: CLINIC | Age: 53
End: 2023-05-25
Payer: COMMERCIAL

## 2023-05-25 RX ORDER — TIRZEPATIDE 2.5 MG/.5ML
2.5 INJECTION, SOLUTION SUBCUTANEOUS
Qty: 2 PEN | Refills: 1 | Status: SHIPPED | OUTPATIENT
Start: 2023-05-25 | End: 2023-06-27

## 2023-05-25 NOTE — TELEPHONE ENCOUNTER
No care due was identified.  [facility] Embedded Care Due Messages. Reference number: [id number].   5/25/2023 1:53:49 PM CDT

## 2023-05-25 NOTE — TELEPHONE ENCOUNTER
Hey,  Pt never received this medication. Pt called pharmacy and staff stated never sent.    Pended but not sure of mg

## 2023-06-04 LAB
CHOLEST SERPL-MCNC: 195 MG/DL (ref 100–199)
HBA1C MFR BLD: 5.3 % (ref 4.8–5.6)
HDLC SERPL-MCNC: 82 MG/DL
LDLC SERPL CALC-MCNC: 104 MG/DL (ref 0–99)
TRIGL SERPL-MCNC: 46 MG/DL (ref 0–149)
TSH SERPL DL<=0.005 MIU/L-ACNC: 1.55 UIU/ML (ref 0.45–4.5)
VLDLC SERPL CALC-MCNC: 9 MG/DL (ref 5–40)

## 2023-06-05 LAB
ALBUMIN SERPL-MCNC: 4.3 G/DL (ref 3.8–4.9)
ALBUMIN/GLOB SERPL: 1.5 {RATIO} (ref 1.2–2.2)
ALP SERPL-CCNC: 64 IU/L (ref 44–121)
ALT SERPL-CCNC: 17 IU/L (ref 0–32)
AST SERPL-CCNC: 13 IU/L (ref 0–40)
BASOPHILS # BLD AUTO: 0 X10E3/UL (ref 0–0.2)
BASOPHILS NFR BLD AUTO: 1 %
BILIRUB SERPL-MCNC: 0.3 MG/DL (ref 0–1.2)
BUN SERPL-MCNC: 15 MG/DL (ref 6–24)
BUN/CREAT SERPL: 21 (ref 9–23)
CALCIUM SERPL-MCNC: 9.9 MG/DL (ref 8.7–10.2)
CHLORIDE SERPL-SCNC: 102 MMOL/L (ref 96–106)
CO2 SERPL-SCNC: 22 MMOL/L (ref 20–29)
CREAT SERPL-MCNC: 0.73 MG/DL (ref 0.57–1)
CRP SERPL-MCNC: 3 MG/L (ref 0–10)
EOSINOPHIL # BLD AUTO: 0.2 X10E3/UL (ref 0–0.4)
EOSINOPHIL NFR BLD AUTO: 4 %
ERYTHROCYTE [DISTWIDTH] IN BLOOD BY AUTOMATED COUNT: 15.2 % (ref 11.7–15.4)
ERYTHROCYTE [SEDIMENTATION RATE] IN BLOOD BY WESTERGREN METHOD: 27 MM/HR (ref 0–40)
EST. GFR  (NO RACE VARIABLE): 97 ML/MIN/1.73
GLOBULIN SER CALC-MCNC: 2.9 G/DL (ref 1.5–4.5)
GLUCOSE SERPL-MCNC: 92 MG/DL (ref 70–99)
HCT VFR BLD AUTO: 37.1 % (ref 34–46.6)
HGB BLD-MCNC: 12.1 G/DL (ref 11.1–15.9)
IMM GRANULOCYTES # BLD AUTO: 0 X10E3/UL (ref 0–0.1)
IMM GRANULOCYTES NFR BLD AUTO: 0 %
LYMPHOCYTES # BLD AUTO: 1.3 X10E3/UL (ref 0.7–3.1)
LYMPHOCYTES NFR BLD AUTO: 24 %
MCH RBC QN AUTO: 29.5 PG (ref 26.6–33)
MCHC RBC AUTO-ENTMCNC: 32.6 G/DL (ref 31.5–35.7)
MCV RBC AUTO: 91 FL (ref 79–97)
MONOCYTES # BLD AUTO: 0.4 X10E3/UL (ref 0.1–0.9)
MONOCYTES NFR BLD AUTO: 7 %
NEUTROPHILS # BLD AUTO: 3.5 X10E3/UL (ref 1.4–7)
NEUTROPHILS NFR BLD AUTO: 64 %
PLATELET # BLD AUTO: 288 X10E3/UL (ref 150–450)
POTASSIUM SERPL-SCNC: 4.3 MMOL/L (ref 3.5–5.2)
PROT SERPL-MCNC: 7.2 G/DL (ref 6–8.5)
RBC # BLD AUTO: 4.1 X10E6/UL (ref 3.77–5.28)
SODIUM SERPL-SCNC: 139 MMOL/L (ref 134–144)
WBC # BLD AUTO: 5.5 X10E3/UL (ref 3.4–10.8)

## 2023-06-07 ENCOUNTER — PATIENT MESSAGE (OUTPATIENT)
Dept: RHEUMATOLOGY | Facility: CLINIC | Age: 53
End: 2023-06-07
Payer: COMMERCIAL

## 2023-06-13 ENCOUNTER — PATIENT MESSAGE (OUTPATIENT)
Dept: INTERNAL MEDICINE | Facility: CLINIC | Age: 53
End: 2023-06-13
Payer: COMMERCIAL

## 2023-06-14 DIAGNOSIS — M05.79 RHEUMATOID ARTHRITIS INVOLVING MULTIPLE SITES WITH POSITIVE RHEUMATOID FACTOR: ICD-10-CM

## 2023-06-15 RX ORDER — SULFASALAZINE 500 MG/1
1500 TABLET ORAL 2 TIMES DAILY
Qty: 540 TABLET | Refills: 0 | Status: SHIPPED | OUTPATIENT
Start: 2023-06-15 | End: 2023-09-23 | Stop reason: SDUPTHER

## 2023-06-16 DIAGNOSIS — M05.79 RHEUMATOID ARTHRITIS INVOLVING MULTIPLE SITES WITH POSITIVE RHEUMATOID FACTOR: ICD-10-CM

## 2023-06-16 RX ORDER — HYDROXYCHLOROQUINE SULFATE 200 MG/1
200 TABLET, FILM COATED ORAL 2 TIMES DAILY
Qty: 60 TABLET | Refills: 2 | Status: SHIPPED | OUTPATIENT
Start: 2023-06-16 | End: 2023-09-25 | Stop reason: SDUPTHER

## 2023-06-19 DIAGNOSIS — I10 ESSENTIAL HYPERTENSION: ICD-10-CM

## 2023-06-19 RX ORDER — AMLODIPINE BESYLATE 5 MG/1
5 TABLET ORAL DAILY
Qty: 90 TABLET | Refills: 3 | Status: SHIPPED | OUTPATIENT
Start: 2023-06-19 | End: 2024-01-29

## 2023-06-19 NOTE — TELEPHONE ENCOUNTER
No care due was identified.  [facility] Embedded Care Due Messages. Reference number: [id number].   6/19/2023 8:25:39 AM CDT

## 2023-06-20 ENCOUNTER — OFFICE VISIT (OUTPATIENT)
Dept: RHEUMATOLOGY | Facility: CLINIC | Age: 53
End: 2023-06-20
Payer: COMMERCIAL

## 2023-06-20 VITALS
DIASTOLIC BLOOD PRESSURE: 63 MMHG | HEART RATE: 63 BPM | WEIGHT: 249.56 LBS | SYSTOLIC BLOOD PRESSURE: 147 MMHG | HEIGHT: 63 IN | BODY MASS INDEX: 44.22 KG/M2

## 2023-06-20 DIAGNOSIS — M05.79 RHEUMATOID ARTHRITIS INVOLVING MULTIPLE SITES WITH POSITIVE RHEUMATOID FACTOR: Primary | ICD-10-CM

## 2023-06-20 DIAGNOSIS — Z79.899 IMMUNOSUPPRESSION DUE TO DRUG THERAPY: ICD-10-CM

## 2023-06-20 DIAGNOSIS — D84.821 IMMUNOSUPPRESSION DUE TO DRUG THERAPY: ICD-10-CM

## 2023-06-20 DIAGNOSIS — M05.79 RHEUMATOID ARTHRITIS INVOLVING MULTIPLE SITES WITH POSITIVE RHEUMATOID FACTOR: ICD-10-CM

## 2023-06-20 PROCEDURE — 3008F PR BODY MASS INDEX (BMI) DOCUMENTED: ICD-10-PCS | Mod: CPTII,S$GLB,, | Performed by: INTERNAL MEDICINE

## 2023-06-20 PROCEDURE — 3077F SYST BP >= 140 MM HG: CPT | Mod: CPTII,S$GLB,, | Performed by: INTERNAL MEDICINE

## 2023-06-20 PROCEDURE — 1159F PR MEDICATION LIST DOCUMENTED IN MEDICAL RECORD: ICD-10-PCS | Mod: CPTII,S$GLB,, | Performed by: INTERNAL MEDICINE

## 2023-06-20 PROCEDURE — 99999 PR PBB SHADOW E&M-EST. PATIENT-LVL IV: CPT | Mod: PBBFAC,,, | Performed by: INTERNAL MEDICINE

## 2023-06-20 PROCEDURE — 3077F PR MOST RECENT SYSTOLIC BLOOD PRESSURE >= 140 MM HG: ICD-10-PCS | Mod: CPTII,S$GLB,, | Performed by: INTERNAL MEDICINE

## 2023-06-20 PROCEDURE — 3078F PR MOST RECENT DIASTOLIC BLOOD PRESSURE < 80 MM HG: ICD-10-PCS | Mod: CPTII,S$GLB,, | Performed by: INTERNAL MEDICINE

## 2023-06-20 PROCEDURE — 1160F PR REVIEW ALL MEDS BY PRESCRIBER/CLIN PHARMACIST DOCUMENTED: ICD-10-PCS | Mod: CPTII,S$GLB,, | Performed by: INTERNAL MEDICINE

## 2023-06-20 PROCEDURE — 3078F DIAST BP <80 MM HG: CPT | Mod: CPTII,S$GLB,, | Performed by: INTERNAL MEDICINE

## 2023-06-20 PROCEDURE — 99214 PR OFFICE/OUTPT VISIT, EST, LEVL IV, 30-39 MIN: ICD-10-PCS | Mod: S$GLB,,, | Performed by: INTERNAL MEDICINE

## 2023-06-20 PROCEDURE — 1160F RVW MEDS BY RX/DR IN RCRD: CPT | Mod: CPTII,S$GLB,, | Performed by: INTERNAL MEDICINE

## 2023-06-20 PROCEDURE — 1159F MED LIST DOCD IN RCRD: CPT | Mod: CPTII,S$GLB,, | Performed by: INTERNAL MEDICINE

## 2023-06-20 PROCEDURE — 4010F PR ACE/ARB THEARPY RXD/TAKEN: ICD-10-PCS | Mod: CPTII,S$GLB,, | Performed by: INTERNAL MEDICINE

## 2023-06-20 PROCEDURE — 3044F HG A1C LEVEL LT 7.0%: CPT | Mod: CPTII,S$GLB,, | Performed by: INTERNAL MEDICINE

## 2023-06-20 PROCEDURE — 99214 OFFICE O/P EST MOD 30 MIN: CPT | Mod: S$GLB,,, | Performed by: INTERNAL MEDICINE

## 2023-06-20 PROCEDURE — 3044F PR MOST RECENT HEMOGLOBIN A1C LEVEL <7.0%: ICD-10-PCS | Mod: CPTII,S$GLB,, | Performed by: INTERNAL MEDICINE

## 2023-06-20 PROCEDURE — 4010F ACE/ARB THERAPY RXD/TAKEN: CPT | Mod: CPTII,S$GLB,, | Performed by: INTERNAL MEDICINE

## 2023-06-20 PROCEDURE — 99999 PR PBB SHADOW E&M-EST. PATIENT-LVL IV: ICD-10-PCS | Mod: PBBFAC,,, | Performed by: INTERNAL MEDICINE

## 2023-06-20 PROCEDURE — 3008F BODY MASS INDEX DOCD: CPT | Mod: CPTII,S$GLB,, | Performed by: INTERNAL MEDICINE

## 2023-06-20 RX ORDER — PREDNISONE 10 MG/1
TABLET ORAL
Qty: 30 TABLET | Refills: 0 | Status: SHIPPED | OUTPATIENT
Start: 2023-06-20 | End: 2023-06-20 | Stop reason: SDUPTHER

## 2023-06-20 RX ORDER — PREDNISONE 10 MG/1
TABLET ORAL
Qty: 30 TABLET | Refills: 0 | Status: SHIPPED | OUTPATIENT
Start: 2023-06-20

## 2023-06-20 ASSESSMENT — ROUTINE ASSESSMENT OF PATIENT INDEX DATA (RAPID3)
TOTAL RAPID3 SCORE: 1.33
AM STIFFNESS SCORE: 1, YES
PAIN SCORE: 1.5
PSYCHOLOGICAL DISTRESS SCORE: 0
FATIGUE SCORE: 1
MDHAQ FUNCTION SCORE: 0.3
PATIENT GLOBAL ASSESSMENT SCORE: 1.5

## 2023-06-20 NOTE — Clinical Note
I am concerned about the patient's weight and impact on her joint.  She told you were working on getting her Mounjaro.  I think this is a great idea.  It will help her weight and her joints.

## 2023-06-20 NOTE — PROGRESS NOTES
"Subjective:       Patient ID: Antonio Ruiz is a 53 y.o. female.    Chief Complaint: Rheumatoid arthritis    HPI:  Antonio Ruiz is a 53 y.o. female with a history   of rheumatoid arthritis manifested by symmetrical small joint involvement,   history of elbow contracture on the right in the past, positive rheumatoid   factor, positive WILMA. Humira stopped due to rash and pain.  Orencia did not help.    The patient currently takes Methotrexate 25 mg weekly, folic acid daily, sulfasalazine 1500 mg twice a day and Plaquenil 200 mg bid.    Patient off prednisone.  Denies pain.  Pain 1/10 ache.   No morning stiffness.          Review of Systems   Constitutional:  Positive for fatigue. Negative for fever and unexpected weight change.   HENT: Negative.  Negative for mouth sores and trouble swallowing.    Eyes: Negative.  Negative for redness.   Respiratory:  Negative for cough and shortness of breath.    Cardiovascular: Negative.  Negative for chest pain.   Gastrointestinal: Negative.  Negative for constipation and diarrhea.   Endocrine: Negative.    Genitourinary: Negative.  Negative for dysuria and genital sores.   Musculoskeletal:  Positive for arthralgias.   Skin:  Negative for rash.   Allergic/Immunologic: Negative.    Neurological: Negative.  Negative for headaches.   Hematological: Negative.  Does not bruise/bleed easily.   Psychiatric/Behavioral: Negative.         Objective:   BP (!) 147/63   Pulse 63   Ht 5' 3" (1.6 m)   Wt 113.2 kg (249 lb 9 oz)   LMP 11/01/2019   BMI 44.21 kg/m²      Physical Exam   Constitutional: She is oriented to person, place, and time.   HENT:   Head: Normocephalic and atraumatic.   Eyes: Conjunctivae are normal.   Cardiovascular: Normal rate, regular rhythm and normal heart sounds.   Pulmonary/Chest: Effort normal and breath sounds normal.   Abdominal: Soft. Bowel sounds are normal.   Musculoskeletal:         General: No tenderness or deformity.      Cervical back: Neck " supple.      Comments: 28 joint count: 0 swollen and 0 tender  No MTP tenderness bilaterally     Neurological: She is alert and oriented to person, place, and time. No cranial nerve deficit. Gait normal.   Skin: Skin is warm and dry.   Psychiatric: Mood and affect normal.     LABS    Component      Latest Ref Rng & Units 6/3/2023   WBC      3.4 - 10.8 x10E3/uL 5.5   RBC      3.77 - 5.28 x10E6/uL 4.10   Hemoglobin      11.1 - 15.9 g/dL 12.1   Hematocrit      34.0 - 46.6 % 37.1   MCV      79 - 97 fL 91   MCH      26.6 - 33.0 pg 29.5   MCHC      31.5 - 35.7 g/dL 32.6   RDW      11.7 - 15.4 % 15.2   Platelets      150 - 450 x10E3/uL 288   Neutrophils      Not Estab. % 64   Lymph %      Not Estab. % 24   Mono %      Not Estab. % 7   Eosinophil %      Not Estab. % 4   Basophil %      Not Estab. % 1   Neutrophils, Abs      1.4 - 7.0 x10E3/uL 3.5   Lymph #      0.7 - 3.1 x10E3/uL 1.3   Mono #      0.1 - 0.9 x10E3/uL 0.4   Eos #      0.0 - 0.4 x10E3/uL 0.2   Baso #      0.0 - 0.2 x10E3/uL 0.0   Immature Granulocytes      Not Estab. % 0   Immature Grans (Abs)      0.0 - 0.1 x10E3/uL 0.0   Glucose      70 - 99 mg/dL 92   BUN      6 - 24 mg/dL 15   Creatinine      0.57 - 1.00 mg/dL 0.73   eGFR      >59 mL/min/1.73 97   BUN/CREAT RATIO      9 - 23 21   Sodium      134 - 144 mmol/L 139   Potassium      3.5 - 5.2 mmol/L 4.3   Chloride      96 - 106 mmol/L 102   CO2      20 - 29 mmol/L 22   Calcium      8.7 - 10.2 mg/dL 9.9   PROTEIN TOTAL      6.0 - 8.5 g/dL 7.2   Albumin      3.8 - 4.9 g/dL 4.3   Globulin, Total      1.5 - 4.5 g/dL 2.9   Albumin/Globulin Ratio      1.2 - 2.2 1.5   BILIRUBIN TOTAL      0.0 - 1.2 mg/dL 0.3   Alkaline Phosphatase      44 - 121 IU/L 64   AST      0 - 40 IU/L 13   ALT      0 - 32 IU/L 17   Cholesterol      100 - 199 mg/dL 195   Triglycerides      0 - 149 mg/dL 46   HDL      >39 mg/dL 82   VLDL Cholesterol Ankit      5 - 40 mg/dL 9   LDL Calculated      0 - 99 mg/dL 104 (H)   Sed Rate      0 - 40 mm/hr  27   CRP      0 - 10 mg/L 3   Hemoglobin A1C External      4.8 - 5.6 % 5.3        Assessment:       1. RA. Failed Humira and Orencia.  On Plaquenil, methotrexate and SSZ.  Doing well.  2. 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 Rash and Positive WILMA/SSA/ Matthew.  Previously +DS DNA and normal complements.  3. Alopecia.  Hair growing still.  4. Leg edema bilateral after starting Norvasc  5. Diarrhea with medications.  With noncoated SSZ  6. Obesity  7. Osteoporosis prophylaxis.  8. HTN.  Control.  Patient to follow to primary doctor  9. Right arm lesion.  Similar lesion treated with a foam by dermatology  10.  Vitamin D deficiency  11.  Back pain  12.  Anemia   13.  Pre-diabetes  14.  Plantar fasciitis        Plan:       1.  Labs  2.  Continue MTX 25 mg qweek, SSZ and plaquenil.  Continue to hold prednisone.     3.  Consider Xeljanz.  Hold on Rituxan for now.    4.  Follow up with Dr. Martines in dermatology  5.  Calcium 1,000 mg daily and vitamin D3 5,000 units daily.    6. Plaquenil exam normal 12/28/2021  7.  Voltaren gel 4 g knee qid  8.  Had flu and COVID vaccine booster.  Later will need shingle and pneumovax 23 with primary.  9.  Discussed NOOM  10.  Discussed weight loss and exercise  11.  Encouraged patient minimize ETOH intake to avoid         RTO 6 months/prn

## 2023-06-23 LAB
LEFT EYE DM RETINOPATHY: NEGATIVE
RIGHT EYE DM RETINOPATHY: NEGATIVE

## 2023-06-27 ENCOUNTER — PATIENT MESSAGE (OUTPATIENT)
Dept: INTERNAL MEDICINE | Facility: CLINIC | Age: 53
End: 2023-06-27
Payer: COMMERCIAL

## 2023-06-27 RX ORDER — SEMAGLUTIDE 0.5 MG/.5ML
0.5 INJECTION, SOLUTION SUBCUTANEOUS
Qty: 2 ML | Refills: 0 | Status: SHIPPED | OUTPATIENT
Start: 2023-06-27 | End: 2023-11-21

## 2023-06-27 NOTE — TELEPHONE ENCOUNTER
Meds5/29/2023  Antonio Ruiz, Myrna Oliva MA (You), Emeka Phillips MD, KEKE Brunner  5/25/2023 2:59 PM  I am inquiring about the mounjaro  5 more messages  Emeka Phillips MD  5/29/2023 12:06 PM  Mounjaro not covered. [facility].    Respectfully,  Emeka Phillips

## 2023-07-14 DIAGNOSIS — I10 ESSENTIAL HYPERTENSION: ICD-10-CM

## 2023-07-15 NOTE — TELEPHONE ENCOUNTER
Refill Routing Note   Medication(s) are not appropriate for processing by [facility] for the following reason(s):      Required vitals abnormal    [facility] action(s):  Defer Care Due:  None identified            Appointments  past 12m or future 3m with PCP    Date Provider   Last Visit   5/18/2023 Emeka Mari MD   Next Visit   11/21/2023 Emeka Mari MD   ED visits in past 90 days: 0        Note composed:9:34 AM 07/15/2023

## 2023-07-15 NOTE — TELEPHONE ENCOUNTER
No care due was identified.  [facility] Embedded Care Due Messages. Reference number: [id number].   7/15/2023 9:32:48 AM CDT

## 2023-07-17 RX ORDER — IRBESARTAN 300 MG/1
300 TABLET ORAL DAILY
Qty: 90 TABLET | Refills: 3 | Status: SHIPPED | OUTPATIENT
Start: 2023-07-17

## 2023-08-04 DIAGNOSIS — M05.79 RHEUMATOID ARTHRITIS INVOLVING MULTIPLE SITES WITH POSITIVE RHEUMATOID FACTOR: ICD-10-CM

## 2023-08-05 RX ORDER — METHOTREXATE 2.5 MG/1
TABLET ORAL
Qty: 40 TABLET | Refills: 2 | Status: SHIPPED | OUTPATIENT
Start: 2023-08-05 | End: 2023-08-09

## 2023-08-08 DIAGNOSIS — M05.79 RHEUMATOID ARTHRITIS INVOLVING MULTIPLE SITES WITH POSITIVE RHEUMATOID FACTOR: ICD-10-CM

## 2023-08-09 RX ORDER — METHOTREXATE 2.5 MG/1
TABLET ORAL
Qty: 128 TABLET | Refills: 1 | Status: SHIPPED | OUTPATIENT
Start: 2023-08-09 | End: 2023-10-30 | Stop reason: SDUPTHER

## 2023-08-15 ENCOUNTER — PATIENT OUTREACH (OUTPATIENT)
Dept: ADMINISTRATIVE | Facility: HOSPITAL | Age: 53
End: 2023-08-15
Payer: COMMERCIAL

## 2023-09-18 LAB
ALBUMIN SERPL-MCNC: 4.1 G/DL (ref 3.8–4.9)
ALBUMIN/GLOB SERPL: 1.3 {RATIO} (ref 1.2–2.2)
ALP SERPL-CCNC: 67 IU/L (ref 44–121)
ALT SERPL-CCNC: 11 IU/L (ref 0–32)
AST SERPL-CCNC: 10 IU/L (ref 0–40)
BASOPHILS # BLD AUTO: 0 X10E3/UL (ref 0–0.2)
BASOPHILS NFR BLD AUTO: 0 %
BILIRUB SERPL-MCNC: 0.3 MG/DL (ref 0–1.2)
BUN SERPL-MCNC: 14 MG/DL (ref 6–24)
BUN/CREAT SERPL: 19 (ref 9–23)
CALCIUM SERPL-MCNC: 9.5 MG/DL (ref 8.7–10.2)
CHLORIDE SERPL-SCNC: 104 MMOL/L (ref 96–106)
CO2 SERPL-SCNC: 22 MMOL/L (ref 20–29)
CREAT SERPL-MCNC: 0.74 MG/DL (ref 0.57–1)
CRP SERPL-MCNC: 2 MG/L (ref 0–10)
EOSINOPHIL # BLD AUTO: 0.1 X10E3/UL (ref 0–0.4)
EOSINOPHIL NFR BLD AUTO: 3 %
ERYTHROCYTE [DISTWIDTH] IN BLOOD BY AUTOMATED COUNT: 14.2 % (ref 11.7–15.4)
ERYTHROCYTE [SEDIMENTATION RATE] IN BLOOD BY WESTERGREN METHOD: 27 MM/HR (ref 0–40)
EST. GFR  (NO RACE VARIABLE): 95 ML/MIN/1.73
GLOBULIN SER CALC-MCNC: 3.1 G/DL (ref 1.5–4.5)
GLUCOSE SERPL-MCNC: 95 MG/DL (ref 70–99)
HCT VFR BLD AUTO: 38.4 % (ref 34–46.6)
HGB BLD-MCNC: 12.3 G/DL (ref 11.1–15.9)
IMM GRANULOCYTES # BLD AUTO: 0 X10E3/UL (ref 0–0.1)
IMM GRANULOCYTES NFR BLD AUTO: 0 %
LYMPHOCYTES # BLD AUTO: 1.2 X10E3/UL (ref 0.7–3.1)
LYMPHOCYTES NFR BLD AUTO: 21 %
MCH RBC QN AUTO: 28.8 PG (ref 26.6–33)
MCHC RBC AUTO-ENTMCNC: 32 G/DL (ref 31.5–35.7)
MCV RBC AUTO: 90 FL (ref 79–97)
MONOCYTES # BLD AUTO: 0.5 X10E3/UL (ref 0.1–0.9)
MONOCYTES NFR BLD AUTO: 10 %
NEUTROPHILS # BLD AUTO: 3.7 X10E3/UL (ref 1.4–7)
NEUTROPHILS NFR BLD AUTO: 66 %
PLATELET # BLD AUTO: 283 X10E3/UL (ref 150–450)
POTASSIUM SERPL-SCNC: 3.8 MMOL/L (ref 3.5–5.2)
PROT SERPL-MCNC: 7.2 G/DL (ref 6–8.5)
RBC # BLD AUTO: 4.27 X10E6/UL (ref 3.77–5.28)
SODIUM SERPL-SCNC: 138 MMOL/L (ref 134–144)
WBC # BLD AUTO: 5.6 X10E3/UL (ref 3.4–10.8)

## 2023-09-23 DIAGNOSIS — M05.79 RHEUMATOID ARTHRITIS INVOLVING MULTIPLE SITES WITH POSITIVE RHEUMATOID FACTOR: ICD-10-CM

## 2023-09-25 DIAGNOSIS — M05.79 RHEUMATOID ARTHRITIS INVOLVING MULTIPLE SITES WITH POSITIVE RHEUMATOID FACTOR: ICD-10-CM

## 2023-09-27 RX ORDER — HYDROXYCHLOROQUINE SULFATE 200 MG/1
200 TABLET, FILM COATED ORAL 2 TIMES DAILY
Qty: 60 TABLET | Refills: 2 | Status: SHIPPED | OUTPATIENT
Start: 2023-09-27 | End: 2024-01-22 | Stop reason: SDUPTHER

## 2023-09-27 RX ORDER — SULFASALAZINE 500 MG/1
1500 TABLET ORAL 2 TIMES DAILY
Qty: 540 TABLET | Refills: 0 | Status: SHIPPED | OUTPATIENT
Start: 2023-09-27 | End: 2023-10-24

## 2023-10-23 DIAGNOSIS — M05.79 RHEUMATOID ARTHRITIS INVOLVING MULTIPLE SITES WITH POSITIVE RHEUMATOID FACTOR: ICD-10-CM

## 2023-10-24 RX ORDER — FOLIC ACID 1 MG/1
TABLET ORAL
Qty: 180 TABLET | Refills: 2 | Status: SHIPPED | OUTPATIENT
Start: 2023-10-24 | End: 2024-01-22 | Stop reason: SDUPTHER

## 2023-10-24 RX ORDER — SULFASALAZINE 500 MG/1
TABLET ORAL
Qty: 540 TABLET | Refills: 0 | Status: SHIPPED | OUTPATIENT
Start: 2023-10-24 | End: 2024-01-22 | Stop reason: SDUPTHER

## 2023-10-29 ENCOUNTER — PATIENT MESSAGE (OUTPATIENT)
Dept: RHEUMATOLOGY | Facility: CLINIC | Age: 53
End: 2023-10-29
Payer: COMMERCIAL

## 2023-10-30 DIAGNOSIS — M05.79 RHEUMATOID ARTHRITIS INVOLVING MULTIPLE SITES WITH POSITIVE RHEUMATOID FACTOR: ICD-10-CM

## 2023-10-31 RX ORDER — METHOTREXATE 2.5 MG/1
TABLET ORAL
Qty: 128 TABLET | Refills: 1 | Status: ACTIVE | OUTPATIENT
Start: 2023-10-31

## 2023-11-21 ENCOUNTER — PATIENT MESSAGE (OUTPATIENT)
Dept: INTERNAL MEDICINE | Facility: CLINIC | Age: 53
End: 2023-11-21

## 2023-11-21 ENCOUNTER — OFFICE VISIT (OUTPATIENT)
Dept: INTERNAL MEDICINE | Facility: CLINIC | Age: 53
End: 2023-11-21
Attending: INTERNAL MEDICINE
Payer: COMMERCIAL

## 2023-11-21 DIAGNOSIS — Z79.52 LONG TERM SYSTEMIC STEROID USER: ICD-10-CM

## 2023-11-21 DIAGNOSIS — I10 PRIMARY HYPERTENSION: ICD-10-CM

## 2023-11-21 DIAGNOSIS — E66.01 CLASS 3 SEVERE OBESITY WITH SERIOUS COMORBIDITY AND BODY MASS INDEX (BMI) OF 45.0 TO 49.9 IN ADULT, UNSPECIFIED OBESITY TYPE: Primary | ICD-10-CM

## 2023-11-21 PROCEDURE — 99214 PR OFFICE/OUTPT VISIT, EST, LEVL IV, 30-39 MIN: ICD-10-PCS | Mod: 95,,, | Performed by: INTERNAL MEDICINE

## 2023-11-21 PROCEDURE — 1159F MED LIST DOCD IN RCRD: CPT | Mod: CPTII,95,, | Performed by: INTERNAL MEDICINE

## 2023-11-21 PROCEDURE — 4010F ACE/ARB THERAPY RXD/TAKEN: CPT | Mod: CPTII,95,, | Performed by: INTERNAL MEDICINE

## 2023-11-21 PROCEDURE — 3044F PR MOST RECENT HEMOGLOBIN A1C LEVEL <7.0%: ICD-10-PCS | Mod: CPTII,95,, | Performed by: INTERNAL MEDICINE

## 2023-11-21 PROCEDURE — 4010F PR ACE/ARB THEARPY RXD/TAKEN: ICD-10-PCS | Mod: CPTII,95,, | Performed by: INTERNAL MEDICINE

## 2023-11-21 PROCEDURE — 3044F HG A1C LEVEL LT 7.0%: CPT | Mod: CPTII,95,, | Performed by: INTERNAL MEDICINE

## 2023-11-21 PROCEDURE — 1159F PR MEDICATION LIST DOCUMENTED IN MEDICAL RECORD: ICD-10-PCS | Mod: CPTII,95,, | Performed by: INTERNAL MEDICINE

## 2023-11-21 PROCEDURE — 99214 OFFICE O/P EST MOD 30 MIN: CPT | Mod: 95,,, | Performed by: INTERNAL MEDICINE

## 2023-11-21 NOTE — PROGRESS NOTES
Subjective:       Patient ID: Antonio Ruiz is a 53 y.o. female.    Chief Complaint: Follow-up and Hypertension    The patient location is: Home [place]   The chief complaint leading to consultation is:   Visit type: audiovisual  Total time spent with patient:   Minutes  Each patient to whom he or she provides medical services by telemedicine is:  (1) informed of the relationship between the physician and patient and the respective role of any other health care provider with respect to management of the patient; and (2) notified that he or she may decline to receive medical services by telemedicine and may withdraw from such care at any time, and (3) potential limitations of virtual visits and the risk that ensues.    52 yo F with PMHx of RA, HTN, Anemia (mixed JUSTINO/AOCD)obesity,   Pt would like to try medication assistance for weight loss in addition to diet and lifestyle changes. Bariatric medicine clinic discussed. GLP agonists and their risk of thyroid cancer, pancreatitis, and dyspepsia discussed. Pt does not have a family hx of MEN, thyroid CA, pancreatitis, nor a personal Hx of pancreatitis. Pt is aware of risk and amenable. Pt aware we may be unable to obtain medications due to insurance restrictions and lack of diagnosis of diabetes. We will try options but do not have medical indication or scenario for us to pursue a prior authorization without diagnosis of diabetes. Pt voiced understanding.     ###HTN ###  -12/22/21 Above goal. Did not take BP meds today. No exercise. BMI 44. Crowded airway on exam. Wakes several times nightly. Wakes herself snoring. HA 3 times a week. No migrainous features.         #### Rheumatoid Arthritis ####  of rheumatoid arthritis manifested by symmetrical small joint involvement, +RF, +WILMA   history of elbow contracture on the right in the past, Humira stopped due to rash and pain.  Orencia did not help.   The patient currently takes Methotrexate 20 mg weekly, folic acid  daily, sulfasalazine 1500 mg twice a day and Plaquenil 200 mg bid.    Patient off prednisone.     ### obesity ###  05/18/23 : 44.05 kg/m²  01/10/23 : 46.16 kg/m²  06/21/22 : 45.69 kg/m²  02/07/22 : 44.35 kg/m²  01/19/22 : 44.16 kg/m²             Review of Systems   Constitutional:  Negative for activity change and unexpected weight change.   HENT:  Negative for hearing loss, rhinorrhea and trouble swallowing.    Eyes:  Negative for discharge and visual disturbance.   Respiratory:  Negative for chest tightness and wheezing.    Cardiovascular:  Negative for chest pain and palpitations.   Gastrointestinal:  Negative for blood in stool, constipation, diarrhea and vomiting.   Endocrine: Negative for polydipsia and polyuria.   Genitourinary:  Negative for difficulty urinating, dysuria, hematuria and menstrual problem.   Musculoskeletal:  Positive for arthralgias and joint swelling. Negative for neck pain.   Neurological:  Negative for weakness and headaches.   Psychiatric/Behavioral:  Negative for confusion and dysphoric mood.        Objective:      There were no vitals filed for this visit.   Physical Exam  Constitutional:       General: She is not in acute distress.     Appearance: Normal appearance. She is well-developed. She is not diaphoretic.   HENT:      Head: Atraumatic.   Eyes:      General: No scleral icterus.        Right eye: No discharge.         Left eye: No discharge.      Conjunctiva/sclera: Conjunctivae normal.   Neck:      Thyroid: No thyromegaly.   Pulmonary:      Effort: Pulmonary effort is normal. No tachypnea, accessory muscle usage or respiratory distress.      Breath sounds: Normal breath sounds.   Skin:     Coloration: Skin is not pale.   Neurological:      Mental Status: She is alert and oriented to person, place, and time.   Psychiatric:         Speech: Speech normal.         Behavior: Behavior normal.         Assessment:       1. Class 3 severe obesity with serious comorbidity and body mass  index (BMI) of 45.0 to 49.9 in adult, unspecified obesity type    2. Primary hypertension    3. Long term systemic steroid user        Plan:       Antonio was seen today for follow-up and hypertension.    Diagnoses and all orders for this visit:    Class 3 severe obesity with serious comorbidity and body mass index (BMI) of 45.0 to 49.9 in adult, unspecified obesity type  -     tirzepatide 2.5 mg/0.5 mL PnIj; Inject 2.5 mg into the skin every 7 days.    Primary hypertension  -     tirzepatide 2.5 mg/0.5 mL PnIj; Inject 2.5 mg into the skin every 7 days.    Long term systemic steroid user  -     tirzepatide 2.5 mg/0.5 mL PnIj; Inject 2.5 mg into the skin every 7 days.  -     Hemoglobin A1C; Future  -     Hemoglobin A1C           Emeka Phillips MD  [facility]        Side effects of medication(s) were discussed in detail and patient voiced understanding.  Patient will call back for any issues or complications.

## 2023-12-18 ENCOUNTER — PATIENT MESSAGE (OUTPATIENT)
Dept: RHEUMATOLOGY | Facility: CLINIC | Age: 53
End: 2023-12-18
Payer: COMMERCIAL

## 2023-12-18 LAB
ALBUMIN SERPL-MCNC: 4.2 G/DL (ref 3.8–4.9)
ALBUMIN/GLOB SERPL: 1.6 {RATIO} (ref 1.2–2.2)
ALP SERPL-CCNC: 69 IU/L (ref 44–121)
ALT SERPL-CCNC: 32 IU/L (ref 0–32)
AST SERPL-CCNC: 20 IU/L (ref 0–40)
BASOPHILS # BLD AUTO: 0 X10E3/UL (ref 0–0.2)
BASOPHILS NFR BLD AUTO: 0 %
BILIRUB SERPL-MCNC: 0.5 MG/DL (ref 0–1.2)
BUN SERPL-MCNC: 11 MG/DL (ref 6–24)
BUN/CREAT SERPL: 14 (ref 9–23)
CALCIUM SERPL-MCNC: 9.5 MG/DL (ref 8.7–10.2)
CHLORIDE SERPL-SCNC: 105 MMOL/L (ref 96–106)
CO2 SERPL-SCNC: 20 MMOL/L (ref 20–29)
CREAT SERPL-MCNC: 0.77 MG/DL (ref 0.57–1)
CRP SERPL-MCNC: 1 MG/L (ref 0–10)
EOSINOPHIL # BLD AUTO: 0.1 X10E3/UL (ref 0–0.4)
EOSINOPHIL NFR BLD AUTO: 3 %
ERYTHROCYTE [DISTWIDTH] IN BLOOD BY AUTOMATED COUNT: 13.8 % (ref 11.7–15.4)
ERYTHROCYTE [SEDIMENTATION RATE] IN BLOOD BY WESTERGREN METHOD: 42 MM/HR (ref 0–40)
EST. GFR  (NO RACE VARIABLE): 91 ML/MIN/1.73
GLOBULIN SER CALC-MCNC: 2.7 G/DL (ref 1.5–4.5)
GLUCOSE SERPL-MCNC: 93 MG/DL (ref 70–99)
HCT VFR BLD AUTO: 37.5 % (ref 34–46.6)
HGB BLD-MCNC: 12.4 G/DL (ref 11.1–15.9)
IMM GRANULOCYTES # BLD AUTO: 0 X10E3/UL (ref 0–0.1)
IMM GRANULOCYTES NFR BLD AUTO: 0 %
LYMPHOCYTES # BLD AUTO: 1.3 X10E3/UL (ref 0.7–3.1)
LYMPHOCYTES NFR BLD AUTO: 26 %
MCH RBC QN AUTO: 29.8 PG (ref 26.6–33)
MCHC RBC AUTO-ENTMCNC: 33.1 G/DL (ref 31.5–35.7)
MCV RBC AUTO: 90 FL (ref 79–97)
MONOCYTES # BLD AUTO: 0.5 X10E3/UL (ref 0.1–0.9)
MONOCYTES NFR BLD AUTO: 10 %
NEUTROPHILS # BLD AUTO: 3.1 X10E3/UL (ref 1.4–7)
NEUTROPHILS NFR BLD AUTO: 61 %
PLATELET # BLD AUTO: 271 X10E3/UL (ref 150–450)
POTASSIUM SERPL-SCNC: 4.5 MMOL/L (ref 3.5–5.2)
PROT SERPL-MCNC: 6.9 G/DL (ref 6–8.5)
RBC # BLD AUTO: 4.16 X10E6/UL (ref 3.77–5.28)
SODIUM SERPL-SCNC: 140 MMOL/L (ref 134–144)
WBC # BLD AUTO: 5 X10E3/UL (ref 3.4–10.8)

## 2023-12-19 LAB — HBA1C MFR BLD: 5.6 % (ref 4.8–5.6)

## 2023-12-27 ENCOUNTER — PATIENT MESSAGE (OUTPATIENT)
Dept: INTERNAL MEDICINE | Facility: CLINIC | Age: 53
End: 2023-12-27
Payer: COMMERCIAL

## 2023-12-27 DIAGNOSIS — I10 PRIMARY HYPERTENSION: ICD-10-CM

## 2023-12-27 DIAGNOSIS — E66.01 CLASS 3 SEVERE OBESITY WITH SERIOUS COMORBIDITY AND BODY MASS INDEX (BMI) OF 45.0 TO 49.9 IN ADULT, UNSPECIFIED OBESITY TYPE: Primary | ICD-10-CM

## 2023-12-27 DIAGNOSIS — M05.79 RHEUMATOID ARTHRITIS INVOLVING MULTIPLE SITES WITH POSITIVE RHEUMATOID FACTOR: ICD-10-CM

## 2023-12-27 DIAGNOSIS — Z79.52 LONG TERM SYSTEMIC STEROID USER: ICD-10-CM

## 2023-12-27 NOTE — TELEPHONE ENCOUNTER
Can you route this to eva, please? I think she is looking for him to do preauth on this med. Thanks.

## 2023-12-28 RX ORDER — SEMAGLUTIDE 0.5 MG/.5ML
0.5 INJECTION, SOLUTION SUBCUTANEOUS
Qty: 4 EACH | Refills: 0 | Status: SHIPPED | OUTPATIENT
Start: 2023-12-28 | End: 2024-02-02

## 2024-01-22 ENCOUNTER — OFFICE VISIT (OUTPATIENT)
Dept: RHEUMATOLOGY | Facility: CLINIC | Age: 54
End: 2024-01-22
Payer: COMMERCIAL

## 2024-01-22 VITALS
WEIGHT: 251 LBS | DIASTOLIC BLOOD PRESSURE: 84 MMHG | HEART RATE: 66 BPM | BODY MASS INDEX: 44.47 KG/M2 | SYSTOLIC BLOOD PRESSURE: 136 MMHG | HEIGHT: 63 IN

## 2024-01-22 DIAGNOSIS — D84.821 IMMUNOSUPPRESSION DUE TO DRUG THERAPY: ICD-10-CM

## 2024-01-22 DIAGNOSIS — Z79.899 IMMUNOSUPPRESSION DUE TO DRUG THERAPY: ICD-10-CM

## 2024-01-22 DIAGNOSIS — M05.79 RHEUMATOID ARTHRITIS INVOLVING MULTIPLE SITES WITH POSITIVE RHEUMATOID FACTOR: Primary | ICD-10-CM

## 2024-01-22 PROCEDURE — 3079F DIAST BP 80-89 MM HG: CPT | Mod: CPTII,S$GLB,, | Performed by: INTERNAL MEDICINE

## 2024-01-22 PROCEDURE — 99999 PR PBB SHADOW E&M-EST. PATIENT-LVL IV: CPT | Mod: PBBFAC,,, | Performed by: INTERNAL MEDICINE

## 2024-01-22 PROCEDURE — 1160F RVW MEDS BY RX/DR IN RCRD: CPT | Mod: CPTII,S$GLB,, | Performed by: INTERNAL MEDICINE

## 2024-01-22 PROCEDURE — 4010F ACE/ARB THERAPY RXD/TAKEN: CPT | Mod: CPTII,S$GLB,, | Performed by: INTERNAL MEDICINE

## 2024-01-22 PROCEDURE — 1159F MED LIST DOCD IN RCRD: CPT | Mod: CPTII,S$GLB,, | Performed by: INTERNAL MEDICINE

## 2024-01-22 PROCEDURE — 99214 OFFICE O/P EST MOD 30 MIN: CPT | Mod: S$GLB,,, | Performed by: INTERNAL MEDICINE

## 2024-01-22 PROCEDURE — 3075F SYST BP GE 130 - 139MM HG: CPT | Mod: CPTII,S$GLB,, | Performed by: INTERNAL MEDICINE

## 2024-01-22 PROCEDURE — 3008F BODY MASS INDEX DOCD: CPT | Mod: CPTII,S$GLB,, | Performed by: INTERNAL MEDICINE

## 2024-01-22 RX ORDER — SULFASALAZINE 500 MG/1
TABLET ORAL
Qty: 540 TABLET | Refills: 0 | Status: SHIPPED | OUTPATIENT
Start: 2024-01-22

## 2024-01-22 RX ORDER — HYDROXYCHLOROQUINE SULFATE 200 MG/1
200 TABLET, FILM COATED ORAL 2 TIMES DAILY
Qty: 180 TABLET | Refills: 0 | Status: SHIPPED | OUTPATIENT
Start: 2024-01-22 | End: 2024-06-05 | Stop reason: SDUPTHER

## 2024-01-22 RX ORDER — FOLIC ACID 1 MG/1
TABLET ORAL
Qty: 180 TABLET | Refills: 2 | Status: SHIPPED | OUTPATIENT
Start: 2024-01-22

## 2024-01-22 ASSESSMENT — ROUTINE ASSESSMENT OF PATIENT INDEX DATA (RAPID3)
PSYCHOLOGICAL DISTRESS SCORE: 0
MDHAQ FUNCTION SCORE: 1.3
TOTAL RAPID3 SCORE: 1.78
AM STIFFNESS SCORE: 1, YES
PAIN SCORE: 1
PATIENT GLOBAL ASSESSMENT SCORE: 0
FATIGUE SCORE: 0

## 2024-02-01 ENCOUNTER — TELEPHONE (OUTPATIENT)
Dept: INTERNAL MEDICINE | Facility: CLINIC | Age: 54
End: 2024-02-01
Payer: COMMERCIAL

## 2024-02-01 DIAGNOSIS — E66.01 CLASS 3 SEVERE OBESITY WITH SERIOUS COMORBIDITY AND BODY MASS INDEX (BMI) OF 45.0 TO 49.9 IN ADULT, UNSPECIFIED OBESITY TYPE: Primary | ICD-10-CM

## 2024-02-01 DIAGNOSIS — I10 PRIMARY HYPERTENSION: ICD-10-CM

## 2024-02-01 DIAGNOSIS — Z79.52 LONG TERM SYSTEMIC STEROID USER: ICD-10-CM

## 2024-02-01 DIAGNOSIS — M05.79 RHEUMATOID ARTHRITIS INVOLVING MULTIPLE SITES WITH POSITIVE RHEUMATOID FACTOR: ICD-10-CM

## 2024-02-01 NOTE — TELEPHONE ENCOUNTER
"Pt Wegovy medication was denied by [facility] due to "pharmacy plan does not cover wegovy" does not list alternatives.  "

## 2024-02-05 ENCOUNTER — TELEPHONE (OUTPATIENT)
Dept: INTERNAL MEDICINE | Facility: CLINIC | Age: 54
End: 2024-02-05
Payer: COMMERCIAL

## 2024-02-15 DIAGNOSIS — I10 PRIMARY HYPERTENSION: ICD-10-CM

## 2024-02-15 DIAGNOSIS — E66.01 CLASS 3 SEVERE OBESITY WITH SERIOUS COMORBIDITY AND BODY MASS INDEX (BMI) OF 45.0 TO 49.9 IN ADULT, UNSPECIFIED OBESITY TYPE: ICD-10-CM

## 2024-02-15 DIAGNOSIS — M05.79 RHEUMATOID ARTHRITIS INVOLVING MULTIPLE SITES WITH POSITIVE RHEUMATOID FACTOR: ICD-10-CM

## 2024-02-15 DIAGNOSIS — Z79.52 LONG TERM SYSTEMIC STEROID USER: ICD-10-CM

## 2024-02-15 NOTE — TELEPHONE ENCOUNTER
No care due was identified.  [facility] Embedded Care Due Messages. Reference number: [id number].   2/15/2024 3:24:52 PM CST

## 2024-02-16 NOTE — TELEPHONE ENCOUNTER
Refill Routing Note   Medication(s) are not appropriate for processing by [facility] for the following reason(s):        New or recently adjusted medication    [facility] action(s):  Defer        Medication Therapy Plan: New start (2/2/24)      Appointments  past 12m or future 3m with PCP    Date Provider   Last Visit   11/21/2023 Emeka Mari MD   Next Visit   Visit date not found Emeka Mari MD   ED visits in past 90 days: 0        Note composed:11:06 AM 02/16/2024

## 2024-02-19 ENCOUNTER — PATIENT MESSAGE (OUTPATIENT)
Dept: ADMINISTRATIVE | Facility: HOSPITAL | Age: 54
End: 2024-02-19
Payer: COMMERCIAL

## 2024-02-19 ENCOUNTER — PATIENT OUTREACH (OUTPATIENT)
Dept: ADMINISTRATIVE | Facility: HOSPITAL | Age: 54
End: 2024-02-19
Payer: COMMERCIAL

## 2024-02-19 DIAGNOSIS — Z12.12 ENCOUNTER FOR COLORECTAL CANCER SCREENING: ICD-10-CM

## 2024-02-19 DIAGNOSIS — Z12.11 ENCOUNTER FOR COLORECTAL CANCER SCREENING: ICD-10-CM

## 2024-02-19 DIAGNOSIS — Z78.0 POSTMENOPAUSAL: Primary | ICD-10-CM

## 2024-02-23 ENCOUNTER — HOSPITAL ENCOUNTER (OUTPATIENT)
Dept: RADIOLOGY | Facility: OTHER | Age: 54
Discharge: HOME | End: 2024-02-23
Attending: INTERNAL MEDICINE
Payer: COMMERCIAL

## 2024-02-23 ENCOUNTER — PATIENT MESSAGE (OUTPATIENT)
Dept: ADMINISTRATIVE | Facility: OTHER | Age: 54
End: 2024-02-23
Payer: COMMERCIAL

## 2024-02-23 DIAGNOSIS — Z78.0 POSTMENOPAUSAL: ICD-10-CM

## 2024-02-23 PROCEDURE — 77080 DXA BONE DENSITY AXIAL: CPT | Mod: TC

## 2024-02-23 PROCEDURE — 77080 DXA BONE DENSITY AXIAL: CPT | Mod: 26,,, | Performed by: RADIOLOGY

## 2024-03-04 ENCOUNTER — PATIENT MESSAGE (OUTPATIENT)
Dept: RHEUMATOLOGY | Facility: CLINIC | Age: 54
End: 2024-03-04
Payer: COMMERCIAL

## 2024-03-04 LAB
ALBUMIN SERPL-MCNC: 4.1 G/DL (ref 3.8–4.9)
ALBUMIN/GLOB SERPL: 1.3 {RATIO} (ref 1.2–2.2)
ALP SERPL-CCNC: 70 IU/L (ref 44–121)
ALT SERPL-CCNC: 11 IU/L (ref 0–32)
AST SERPL-CCNC: 12 IU/L (ref 0–40)
BASOPHILS # BLD AUTO: 0 X10E3/UL (ref 0–0.2)
BASOPHILS NFR BLD AUTO: 0 %
BILIRUB SERPL-MCNC: 0.4 MG/DL (ref 0–1.2)
BUN SERPL-MCNC: 19 MG/DL (ref 6–24)
BUN/CREAT SERPL: 24 (ref 9–23)
CALCIUM SERPL-MCNC: 9.6 MG/DL (ref 8.7–10.2)
CHLORIDE SERPL-SCNC: 102 MMOL/L (ref 96–106)
CO2 SERPL-SCNC: 21 MMOL/L (ref 20–29)
CREAT SERPL-MCNC: 0.78 MG/DL (ref 0.57–1)
CRP SERPL-MCNC: 3 MG/L (ref 0–10)
EOSINOPHIL # BLD AUTO: 0.1 X10E3/UL (ref 0–0.4)
EOSINOPHIL NFR BLD AUTO: 2 %
ERYTHROCYTE [DISTWIDTH] IN BLOOD BY AUTOMATED COUNT: 14 % (ref 11.7–15.4)
ERYTHROCYTE [SEDIMENTATION RATE] IN BLOOD BY WESTERGREN METHOD: NORMAL MM/HR
EST. GFR  (NO RACE VARIABLE): 90 ML/MIN/1.73
GLOBULIN SER CALC-MCNC: 3.2 G/DL (ref 1.5–4.5)
GLUCOSE SERPL-MCNC: 89 MG/DL (ref 70–99)
HCT VFR BLD AUTO: 36.7 % (ref 34–46.6)
HGB BLD-MCNC: 12 G/DL (ref 11.1–15.9)
IMM GRANULOCYTES # BLD AUTO: 0 X10E3/UL (ref 0–0.1)
IMM GRANULOCYTES NFR BLD AUTO: 0 %
LYMPHOCYTES # BLD AUTO: 1.1 X10E3/UL (ref 0.7–3.1)
LYMPHOCYTES NFR BLD AUTO: 19 %
MCH RBC QN AUTO: 28.3 PG (ref 26.6–33)
MCHC RBC AUTO-ENTMCNC: 32.7 G/DL (ref 31.5–35.7)
MCV RBC AUTO: 87 FL (ref 79–97)
MONOCYTES # BLD AUTO: 0.4 X10E3/UL (ref 0.1–0.9)
MONOCYTES NFR BLD AUTO: 8 %
NEUTROPHILS # BLD AUTO: 3.9 X10E3/UL (ref 1.4–7)
NEUTROPHILS NFR BLD AUTO: 71 %
PLATELET # BLD AUTO: 253 X10E3/UL (ref 150–450)
POTASSIUM SERPL-SCNC: 3.9 MMOL/L (ref 3.5–5.2)
PROT SERPL-MCNC: 7.3 G/DL (ref 6–8.5)
RBC # BLD AUTO: 4.24 X10E6/UL (ref 3.77–5.28)
SODIUM SERPL-SCNC: 139 MMOL/L (ref 134–144)
WBC # BLD AUTO: 5.6 X10E3/UL (ref 3.4–10.8)

## 2024-03-27 ENCOUNTER — CLINICAL SUPPORT (OUTPATIENT)
Dept: ENDOSCOPY | Facility: HOSPITAL | Age: 54
End: 2024-03-27
Attending: INTERNAL MEDICINE
Payer: COMMERCIAL

## 2024-03-27 DIAGNOSIS — Z12.11 ENCOUNTER FOR COLORECTAL CANCER SCREENING: ICD-10-CM

## 2024-03-27 DIAGNOSIS — Z12.12 ENCOUNTER FOR COLORECTAL CANCER SCREENING: ICD-10-CM

## 2024-03-27 NOTE — PLAN OF CARE
Called pt to schedule colonoscopy.no available dates at [facility].pt will proceed with cologuard at this time and call back if decides to proceed with colonoscopy.

## 2024-04-03 ENCOUNTER — PATIENT MESSAGE (OUTPATIENT)
Dept: RHEUMATOLOGY | Facility: CLINIC | Age: 54
End: 2024-04-03
Payer: COMMERCIAL

## 2024-04-03 ENCOUNTER — PATIENT MESSAGE (OUTPATIENT)
Dept: UROGYNECOLOGY | Facility: CLINIC | Age: 54
End: 2024-04-03
Payer: COMMERCIAL

## 2024-04-05 ENCOUNTER — PATIENT MESSAGE (OUTPATIENT)
Dept: INTERNAL MEDICINE | Facility: CLINIC | Age: 54
End: 2024-04-05
Payer: COMMERCIAL

## 2024-05-16 DIAGNOSIS — M05.79 RHEUMATOID ARTHRITIS INVOLVING MULTIPLE SITES WITH POSITIVE RHEUMATOID FACTOR: ICD-10-CM

## 2024-05-16 RX ORDER — METHOTREXATE 2.5 MG/1
TABLET ORAL
Qty: 128 TABLET | Refills: 0 | Status: SHIPPED | OUTPATIENT
Start: 2024-05-16

## 2024-05-30 ENCOUNTER — PATIENT MESSAGE (OUTPATIENT)
Dept: ADMINISTRATIVE | Facility: HOSPITAL | Age: 54
End: 2024-05-30
Payer: COMMERCIAL

## 2024-06-05 DIAGNOSIS — M05.79 RHEUMATOID ARTHRITIS INVOLVING MULTIPLE SITES WITH POSITIVE RHEUMATOID FACTOR: ICD-10-CM

## 2024-06-05 RX ORDER — HYDROXYCHLOROQUINE SULFATE 200 MG/1
200 TABLET, FILM COATED ORAL 2 TIMES DAILY
Qty: 180 TABLET | Refills: 0 | Status: SHIPPED | OUTPATIENT
Start: 2024-06-05

## 2024-09-25 ENCOUNTER — PATIENT MESSAGE (OUTPATIENT)
Dept: ADMINISTRATIVE | Facility: OTHER | Age: 54
End: 2024-09-25
Payer: COMMERCIAL

## 2024-09-29 DIAGNOSIS — D84.821 IMMUNOSUPPRESSION DUE TO DRUG THERAPY: ICD-10-CM

## 2024-09-29 DIAGNOSIS — Z79.899 IMMUNOSUPPRESSION DUE TO DRUG THERAPY: ICD-10-CM

## 2024-09-29 DIAGNOSIS — M05.79 RHEUMATOID ARTHRITIS INVOLVING MULTIPLE SITES WITH POSITIVE RHEUMATOID FACTOR: Primary | ICD-10-CM

## 2024-10-01 RX ORDER — SULFASALAZINE 500 MG/1
TABLET ORAL
Qty: 540 TABLET | Refills: 0 | Status: SHIPPED | OUTPATIENT
Start: 2024-10-01

## 2024-10-01 RX ORDER — HYDROXYCHLOROQUINE SULFATE 200 MG/1
200 TABLET, FILM COATED ORAL 2 TIMES DAILY
Qty: 180 TABLET | Refills: 0 | Status: SHIPPED | OUTPATIENT
Start: 2024-10-01

## 2024-10-08 ENCOUNTER — PATIENT MESSAGE (OUTPATIENT)
Dept: RHEUMATOLOGY | Facility: CLINIC | Age: 54
End: 2024-10-08
Payer: COMMERCIAL

## 2024-10-17 DIAGNOSIS — M05.79 RHEUMATOID ARTHRITIS INVOLVING MULTIPLE SITES WITH POSITIVE RHEUMATOID FACTOR: ICD-10-CM

## 2024-10-17 RX ORDER — SULFASALAZINE 500 MG/1
TABLET ORAL
Qty: 540 TABLET | Refills: 0 | Status: SHIPPED | OUTPATIENT
Start: 2024-10-17

## 2024-10-23 ENCOUNTER — TELEPHONE (OUTPATIENT)
Dept: INTERNAL MEDICINE | Facility: CLINIC | Age: 54
End: 2024-10-23
Payer: COMMERCIAL

## 2024-11-25 ENCOUNTER — OFFICE VISIT (OUTPATIENT)
Dept: INTERNAL MEDICINE | Facility: CLINIC | Age: 54
End: 2024-11-25
Attending: INTERNAL MEDICINE
Payer: COMMERCIAL

## 2024-11-25 VITALS
HEIGHT: 63 IN | WEIGHT: 247.38 LBS | BODY MASS INDEX: 43.83 KG/M2 | DIASTOLIC BLOOD PRESSURE: 68 MMHG | OXYGEN SATURATION: 98 % | HEART RATE: 69 BPM | SYSTOLIC BLOOD PRESSURE: 110 MMHG

## 2024-11-25 DIAGNOSIS — M05.79 RHEUMATOID ARTHRITIS INVOLVING MULTIPLE SITES WITH POSITIVE RHEUMATOID FACTOR: ICD-10-CM

## 2024-11-25 DIAGNOSIS — E66.01 CLASS 3 SEVERE OBESITY WITH SERIOUS COMORBIDITY AND BODY MASS INDEX (BMI) OF 45.0 TO 49.9 IN ADULT, UNSPECIFIED OBESITY TYPE: ICD-10-CM

## 2024-11-25 DIAGNOSIS — Z80.3 FAMILY HISTORY OF BREAST CANCER: ICD-10-CM

## 2024-11-25 DIAGNOSIS — E66.813 CLASS 3 SEVERE OBESITY WITH SERIOUS COMORBIDITY AND BODY MASS INDEX (BMI) OF 45.0 TO 49.9 IN ADULT, UNSPECIFIED OBESITY TYPE: ICD-10-CM

## 2024-11-25 DIAGNOSIS — Z00.00 ANNUAL PHYSICAL EXAM: Primary | ICD-10-CM

## 2024-11-25 DIAGNOSIS — I10 ESSENTIAL HYPERTENSION: ICD-10-CM

## 2024-11-25 PROCEDURE — 99999 PR PBB SHADOW E&M-EST. PATIENT-LVL IV: CPT | Mod: PBBFAC,,, | Performed by: INTERNAL MEDICINE

## 2024-11-25 RX ORDER — IRBESARTAN 300 MG/1
300 TABLET ORAL DAILY
COMMUNITY
Start: 2024-10-21

## 2024-11-25 NOTE — PROGRESS NOTES
Subjective:       Patient ID: Antonio Ruiz is a 54 y.o. female.    Chief Complaint: Annual Exam and Ear clogged    Here for annual exam      Right sided ear fullness. Feels like she is in a tunnel. 11/13/24     patient currently takes Methotrexate 25 mg weekly, folic acid daily, sulfasalazine 1500 mg twice a day and Plaquenil 200 mg bid.    Patient off prednisone. Eye exam in 2 months    ###HTN ###  -12/22/21 Above goal. Did not take BP meds today. No exercise. BMI 44. Crowded airway on exam. Wakes several times nightly. Wakes herself snoring. HA 3 times a week. No migrainous features.         #### Rheumatoid Arthritis ####  - manifested by symmetrical small joint involvement, +RF, +WILMA   -history of elbow contracture on the right in the past, Humira stopped due to rash and pain.  Orencia did not help.  -The patient currently takes Methotrexate 20 mg weekly, folic acid daily, sulfasalazine 1500 mg twice a day and Plaquenil 200 mg bid.    Patient off prednisone.     ### obesity ###  05/18/23 : 44.05 kg/m²  01/10/23 : 46.16 kg/m²  06/21/22 : 45.69 kg/m²  02/07/22 : 44.35 kg/m²  01/19/22 : 44.16 kg/m²                History of Present Illness              Review of Systems   Constitutional:  Negative for chills, fatigue, fever and unexpected weight change.   HENT:  Negative for ear pain, hearing loss, postnasal drip, tinnitus, trouble swallowing and voice change.    Respiratory:  Negative for cough, chest tightness, shortness of breath and wheezing.    Cardiovascular:  Negative for chest pain, palpitations and leg swelling.   Gastrointestinal:  Negative for abdominal pain, blood in stool, diarrhea, nausea and vomiting.   Endocrine: Negative for polydipsia, polyphagia and polyuria.   Genitourinary:  Negative for difficulty urinating, dysuria, hematuria and vaginal bleeding.   Skin:  Negative for rash.   Allergic/Immunologic: Negative for food allergies.   Neurological:  Negative for dizziness, numbness and  "headaches.   Hematological:  Does not bruise/bleed easily.   Psychiatric/Behavioral:  The patient is not nervous/anxious.        Objective:      Vitals:    11/25/24 1445   BP: 110/68   BP Location: Right arm   Patient Position: Sitting   Pulse: 69   SpO2: 98%   Weight: 112.2 kg (247 lb 5.7 oz)   Height: 5' 3" (1.6 m)      Physical Exam  Vitals and nursing note reviewed.   Constitutional:       General: She is not in acute distress.     Appearance: Normal appearance. She is well-developed.   HENT:      Head: Normocephalic and atraumatic.      Comments: Copius cerumen removed along with paper thin ribbon like material surrounded in wax removed.some cerumen remains and right TM not visualized. No pain during procedure. Left canal open     Mouth/Throat:      Pharynx: No oropharyngeal exudate.   Eyes:      General: No scleral icterus.     Conjunctiva/sclera: Conjunctivae normal.      Pupils: Pupils are equal, round, and reactive to light.   Neck:      Thyroid: No thyromegaly.   Cardiovascular:      Rate and Rhythm: Normal rate and regular rhythm.      Heart sounds: Normal heart sounds. No murmur heard.  Pulmonary:      Effort: Pulmonary effort is normal.      Breath sounds: Normal breath sounds. No wheezing or rales.   Abdominal:      General: There is no distension.   Musculoskeletal:         General: No tenderness.   Lymphadenopathy:      Cervical: No cervical adenopathy.   Skin:     General: Skin is warm and dry.   Neurological:      Mental Status: She is alert and oriented to person, place, and time.   Psychiatric:         Behavior: Behavior normal.         Assessment:       1. Annual physical exam    2. Rheumatoid arthritis involving multiple sites with positive rheumatoid factor    3. Essential hypertension    4. Family history of breast cancer    5. Class 3 severe obesity with serious comorbidity and body mass index (BMI) of 45.0 to 49.9 in adult, unspecified obesity type        Plan:       Antonio was seen " today for annual exam and ear clogged.    Diagnoses and all orders for this visit:    Annual physical exam  -     Cancel: Comprehensive Metabolic Panel; Future  -     Cancel: Lipid Panel; Future  -     Cancel: TSH; Future  -     Cancel: CBC Auto Differential; Future  -     Cancel: Hemoglobin A1C; Future  -     Hemoglobin A1C; Future  -     Hemoglobin A1C  -     CBC Auto Differential; Future  -     CBC Auto Differential  -     TSH; Future  -     TSH  -     Lipid Panel; Future  -     Lipid Panel  -     Comprehensive Metabolic Panel; Future  -     Comprehensive Metabolic Panel    Rheumatoid arthritis involving multiple sites with positive rheumatoid factor   Controlled at this time and scheduled fot eye exam on plaquenil    Essential hypertension   Controlled and asymptomatic.  Continue current Rx regimen.    Family history of breast cancer  -     Mammo Digital Screening Bilat; Future  -     Mammo Digital Screening Bilat    Class 3 severe obesity with serious comorbidity and body mass index (BMI) of 45.0 to 49.9 in adult, unspecified obesity type       Visit today is associated with current or anticipated ongoing medical care related to this patient's single serious condition/complex condition of RA, . The patient will return to see me as these issues will be followed longitudinally.      Assessment & Plan              This note was generated with the assistance of ambient listening technology. Verbal consent was obtained by the patient and accompanying visitor(s) for the recording of patient appointment to facilitate this note. I attest to having reviewed and edited the generated note for accuracy, though some syntax or spelling errors may persist. Please contact the author of this note for any clarification.      Emeka Phillips MD  Internal Medicine-[facility]        Side effects of medication(s) were discussed in detail and patient voiced understanding.  Patient will call back for any issues or complications.

## 2024-11-30 DIAGNOSIS — M05.79 RHEUMATOID ARTHRITIS INVOLVING MULTIPLE SITES WITH POSITIVE RHEUMATOID FACTOR: ICD-10-CM

## 2024-12-03 ENCOUNTER — PATIENT MESSAGE (OUTPATIENT)
Dept: RHEUMATOLOGY | Facility: CLINIC | Age: 54
End: 2024-12-03
Payer: COMMERCIAL

## 2024-12-03 DIAGNOSIS — M05.79 RHEUMATOID ARTHRITIS INVOLVING MULTIPLE SITES WITH POSITIVE RHEUMATOID FACTOR: ICD-10-CM

## 2024-12-03 RX ORDER — METHOTREXATE 2.5 MG/1
TABLET ORAL
Qty: 128 TABLET | Refills: 0 | Status: SHIPPED | OUTPATIENT
Start: 2024-12-03

## 2024-12-03 RX ORDER — FOLIC ACID 1 MG/1
TABLET ORAL
Qty: 180 TABLET | Refills: 2 | Status: SHIPPED | OUTPATIENT
Start: 2024-12-03

## 2024-12-05 RX ORDER — METHOTREXATE 2.5 MG/1
TABLET ORAL
Qty: 128 TABLET | Refills: 0 | Status: SHIPPED | OUTPATIENT
Start: 2024-12-05

## 2024-12-05 RX ORDER — FOLIC ACID 1 MG/1
TABLET ORAL
Qty: 180 TABLET | Refills: 2 | Status: SHIPPED | OUTPATIENT
Start: 2024-12-05

## 2024-12-12 DIAGNOSIS — M05.79 RHEUMATOID ARTHRITIS INVOLVING MULTIPLE SITES WITH POSITIVE RHEUMATOID FACTOR: ICD-10-CM

## 2024-12-12 RX ORDER — METHOTREXATE 2.5 MG/1
TABLET ORAL
Qty: 128 TABLET | Refills: 0 | Status: SHIPPED | OUTPATIENT
Start: 2024-12-12

## 2025-01-04 NOTE — TELEPHONE ENCOUNTER
No care due was identified.  [facility] Embedded Care Due Messages. Reference number: [id number].   1/04/2025 5:41:26 AM CST

## 2025-01-05 NOTE — TELEPHONE ENCOUNTER
Refill Routing Note   Medication(s) are not appropriate for processing by [facility] for the following reason(s):        No active prescription written by provider    [facility] action(s):  Defer               Appointments  past 12m or future 3m with PCP    Date Provider   Last Visit   11/25/2024 Emeka Mari MD   Next Visit   Visit date not found Emeka Mari MD   ED visits in past 90 days: 0        Note composed:2:53 PM 01/05/2025

## 2025-01-06 RX ORDER — IRBESARTAN 300 MG/1
TABLET ORAL
Qty: 90 TABLET | Refills: 2 | Status: SHIPPED | OUTPATIENT
Start: 2025-01-06

## 2025-01-15 ENCOUNTER — PATIENT MESSAGE (OUTPATIENT)
Dept: INTERNAL MEDICINE | Facility: CLINIC | Age: 55
End: 2025-01-15
Payer: COMMERCIAL

## 2025-01-15 ENCOUNTER — PATIENT MESSAGE (OUTPATIENT)
Dept: RHEUMATOLOGY | Facility: CLINIC | Age: 55
End: 2025-01-15
Payer: COMMERCIAL

## 2025-01-15 DIAGNOSIS — M05.79 RHEUMATOID ARTHRITIS INVOLVING MULTIPLE SITES WITH POSITIVE RHEUMATOID FACTOR: ICD-10-CM

## 2025-01-15 RX ORDER — HYDROXYCHLOROQUINE SULFATE 200 MG/1
200 TABLET, FILM COATED ORAL 2 TIMES DAILY
Qty: 180 TABLET | Refills: 0 | Status: SHIPPED | OUTPATIENT
Start: 2025-01-15

## 2025-02-18 ENCOUNTER — PATIENT MESSAGE (OUTPATIENT)
Dept: ADMINISTRATIVE | Facility: HOSPITAL | Age: 55
End: 2025-02-18
Payer: COMMERCIAL

## 2025-02-18 ENCOUNTER — PATIENT MESSAGE (OUTPATIENT)
Dept: ADMINISTRATIVE | Facility: OTHER | Age: 55
End: 2025-02-18
Payer: COMMERCIAL

## 2025-02-18 ENCOUNTER — PATIENT OUTREACH (OUTPATIENT)
Dept: ADMINISTRATIVE | Facility: HOSPITAL | Age: 55
End: 2025-02-18
Payer: COMMERCIAL

## 2025-02-25 DIAGNOSIS — M05.79 RHEUMATOID ARTHRITIS INVOLVING MULTIPLE SITES WITH POSITIVE RHEUMATOID FACTOR: ICD-10-CM

## 2025-02-27 ENCOUNTER — PATIENT MESSAGE (OUTPATIENT)
Dept: INTERNAL MEDICINE | Facility: CLINIC | Age: 55
End: 2025-02-27
Payer: COMMERCIAL

## 2025-02-27 DIAGNOSIS — E66.813 CLASS 3 SEVERE OBESITY WITH SERIOUS COMORBIDITY AND BODY MASS INDEX (BMI) OF 45.0 TO 49.9 IN ADULT, UNSPECIFIED OBESITY TYPE: Primary | ICD-10-CM

## 2025-02-27 DIAGNOSIS — E66.01 CLASS 3 SEVERE OBESITY WITH SERIOUS COMORBIDITY AND BODY MASS INDEX (BMI) OF 45.0 TO 49.9 IN ADULT, UNSPECIFIED OBESITY TYPE: Primary | ICD-10-CM

## 2025-02-27 DIAGNOSIS — I10 PRIMARY HYPERTENSION: ICD-10-CM

## 2025-02-27 RX ORDER — SEMAGLUTIDE 0.5 MG/.5ML
0.5 INJECTION, SOLUTION SUBCUTANEOUS
Qty: 2 ML | Refills: 0 | Status: SHIPPED | OUTPATIENT
Start: 2025-02-27

## 2025-02-27 RX ORDER — FOLIC ACID 1 MG/1
TABLET ORAL
Qty: 180 TABLET | Refills: 2 | Status: SHIPPED | OUTPATIENT
Start: 2025-02-27

## 2025-02-27 RX ORDER — METHOTREXATE 2.5 MG/1
TABLET ORAL
Qty: 128 TABLET | Refills: 0 | Status: SHIPPED | OUTPATIENT
Start: 2025-02-27

## 2025-02-27 NOTE — TELEPHONE ENCOUNTER
No care due was identified.  [facility] Embedded Care Due Messages. Reference number: [id number].   2/27/2025 8:21:12 AM CST

## 2025-02-27 NOTE — TELEPHONE ENCOUNTER
Refill Encounter    PCP Visits: Recent Visits  Date Type Provider Dept   11/25/24 Office Visit Emeka Mari MD [facility] Internal Medicine   Showing recent visits within past 360 days and meeting all other requirements  Future Appointments  No visits were found meeting these conditions.  Showing future appointments within next 720 days and meeting all other requirements      Last 3 Blood Pressure:   BP Readings from Last 3 Encounters:   11/25/24 110/68   01/22/24 136/84   06/20/23 (!) 147/63     Preferred Pharmacy:   [id number] - [place], [place] [facility] [address] [place] [id number]  [place] [address]  Phone: [phone] Fax: [phone]    Requested RX:  Requested Prescriptions     Pending Prescriptions Disp Refills    semaglutide, weight loss, (WEGOVY) 0.5 mg/0.5 mL PnIj 2 mL 0     Sig: Inject 0.5 mg into the skin every 7 days.      RX Route: Normal

## 2025-03-05 ENCOUNTER — PATIENT MESSAGE (OUTPATIENT)
Dept: RHEUMATOLOGY | Facility: CLINIC | Age: 55
End: 2025-03-05
Payer: COMMERCIAL

## 2025-03-07 ENCOUNTER — TELEPHONE (OUTPATIENT)
Dept: RHEUMATOLOGY | Facility: CLINIC | Age: 55
End: 2025-03-07
Payer: COMMERCIAL

## 2025-03-07 NOTE — TELEPHONE ENCOUNTER
Called patient to ask her if she was ok with seeing 's nurse practitioner so I can get her scheduled.

## 2025-03-10 ENCOUNTER — PATIENT OUTREACH (OUTPATIENT)
Dept: ADMINISTRATIVE | Facility: HOSPITAL | Age: 55
End: 2025-03-10
Payer: COMMERCIAL

## 2025-04-07 DIAGNOSIS — D84.821 IMMUNOSUPPRESSION DUE TO DRUG THERAPY: ICD-10-CM

## 2025-04-07 DIAGNOSIS — M05.79 RHEUMATOID ARTHRITIS INVOLVING MULTIPLE SITES WITH POSITIVE RHEUMATOID FACTOR: Primary | ICD-10-CM

## 2025-04-07 DIAGNOSIS — Z79.899 IMMUNOSUPPRESSION DUE TO DRUG THERAPY: ICD-10-CM

## 2025-04-11 RX ORDER — SULFASALAZINE 500 MG/1
TABLET ORAL
Qty: 540 TABLET | Refills: 0 | OUTPATIENT
Start: 2025-04-11

## 2025-04-11 NOTE — TELEPHONE ENCOUNTER
Staff to inform patient lab orders have been sent to [facility] and schedule her for an appointment with me or rheumatology NP

## 2025-04-21 ENCOUNTER — PATIENT MESSAGE (OUTPATIENT)
Dept: ADMINISTRATIVE | Facility: OTHER | Age: 55
End: 2025-04-21
Payer: COMMERCIAL

## 2025-04-22 ENCOUNTER — PATIENT MESSAGE (OUTPATIENT)
Dept: RHEUMATOLOGY | Facility: CLINIC | Age: 55
End: 2025-04-22
Payer: COMMERCIAL

## 2025-04-22 DIAGNOSIS — R79.89 ELEVATED LFTS: Primary | ICD-10-CM

## 2025-04-22 NOTE — TELEPHONE ENCOUNTER
Spoke with patient regarding labs.  Elevated LFTs.  She never took Wegovy.  Drank daquiris Croatian quarter a week after had labs.   Took a shot ozempic two weeks ago (she had 4 total).    Was take Britta-seltzer cold and mucinex and allegra.   Hold methotrexate and ozempic.  Avoid alcohol.   Repeat LFTs one week.

## 2025-04-23 ENCOUNTER — OFFICE VISIT (OUTPATIENT)
Dept: INTERNAL MEDICINE | Facility: CLINIC | Age: 55
End: 2025-04-23
Payer: COMMERCIAL

## 2025-04-23 VITALS
WEIGHT: 250.44 LBS | BODY MASS INDEX: 44.36 KG/M2 | HEART RATE: 61 BPM | SYSTOLIC BLOOD PRESSURE: 114 MMHG | DIASTOLIC BLOOD PRESSURE: 70 MMHG | OXYGEN SATURATION: 98 %

## 2025-04-23 DIAGNOSIS — E66.813 CLASS 3 DRUG-INDUCED OBESITY WITH SERIOUS COMORBIDITY AND BODY MASS INDEX (BMI) OF 40.0 TO 44.9 IN ADULT: Primary | ICD-10-CM

## 2025-04-23 DIAGNOSIS — I10 PRIMARY HYPERTENSION: ICD-10-CM

## 2025-04-23 DIAGNOSIS — M05.79 RHEUMATOID ARTHRITIS INVOLVING MULTIPLE SITES WITH POSITIVE RHEUMATOID FACTOR: ICD-10-CM

## 2025-04-23 DIAGNOSIS — E88.810 METABOLIC SYNDROME: ICD-10-CM

## 2025-04-23 DIAGNOSIS — E66.1 CLASS 3 DRUG-INDUCED OBESITY WITH SERIOUS COMORBIDITY AND BODY MASS INDEX (BMI) OF 40.0 TO 44.9 IN ADULT: Primary | ICD-10-CM

## 2025-04-23 DIAGNOSIS — R06.83 SNORING: ICD-10-CM

## 2025-04-23 PROCEDURE — 3008F BODY MASS INDEX DOCD: CPT | Mod: CPTII,S$GLB,, | Performed by: STUDENT IN AN ORGANIZED HEALTH CARE EDUCATION/TRAINING PROGRAM

## 2025-04-23 PROCEDURE — 3078F DIAST BP <80 MM HG: CPT | Mod: CPTII,S$GLB,, | Performed by: STUDENT IN AN ORGANIZED HEALTH CARE EDUCATION/TRAINING PROGRAM

## 2025-04-23 PROCEDURE — 99999 PR PBB SHADOW E&M-EST. PATIENT-LVL IV: CPT | Mod: PBBFAC,,, | Performed by: STUDENT IN AN ORGANIZED HEALTH CARE EDUCATION/TRAINING PROGRAM

## 2025-04-23 PROCEDURE — 3074F SYST BP LT 130 MM HG: CPT | Mod: CPTII,S$GLB,, | Performed by: STUDENT IN AN ORGANIZED HEALTH CARE EDUCATION/TRAINING PROGRAM

## 2025-04-23 PROCEDURE — 99214 OFFICE O/P EST MOD 30 MIN: CPT | Mod: S$GLB,,, | Performed by: STUDENT IN AN ORGANIZED HEALTH CARE EDUCATION/TRAINING PROGRAM

## 2025-04-23 PROCEDURE — 1159F MED LIST DOCD IN RCRD: CPT | Mod: CPTII,S$GLB,, | Performed by: STUDENT IN AN ORGANIZED HEALTH CARE EDUCATION/TRAINING PROGRAM

## 2025-04-23 PROCEDURE — 4010F ACE/ARB THERAPY RXD/TAKEN: CPT | Mod: CPTII,S$GLB,, | Performed by: STUDENT IN AN ORGANIZED HEALTH CARE EDUCATION/TRAINING PROGRAM

## 2025-04-23 RX ORDER — TIRZEPATIDE 2.5 MG/.5ML
2.5 INJECTION, SOLUTION SUBCUTANEOUS
Qty: 2 ML | Refills: 0 | Status: SHIPPED | OUTPATIENT
Start: 2025-04-23 | End: 2025-04-23 | Stop reason: SDUPTHER

## 2025-04-23 RX ORDER — TIRZEPATIDE 2.5 MG/.5ML
2.5 INJECTION, SOLUTION SUBCUTANEOUS
Qty: 2 ML | Refills: 0 | Status: SHIPPED | OUTPATIENT
Start: 2025-04-23

## 2025-04-23 NOTE — PROGRESS NOTES
Subjective:       Patient ID: Antonio Ruiz is a 55 y.o. female.    Chief Complaint: Class 3 drug-induced obesity with serious comorbidity and body mass index (BMI) of 40.0 to 44.9 in adult [E66.813, E66.1, Z68.41]    Patient is established with me, here today for the following:    History of Present Illness      WEIGHT MANAGEMENT HISTORY:  She previously tried Tirzepatide through [facility] last year, reaching a maximum dose of 7.5 mg.    DIET AND EXERCISE:  She consumes two meals daily: lunch from workplace cafeteria or Asian restaurants with occasional salads, and home-cooked dinner including pork chops, hamburgers, soul food with greens, and sides of pasta or potatoes. She frequently snacks on candies throughout the day. Her beverages include flavored water, sweet tea, Sprite, Coke Zero, and evening alcohol. She currently has no exercise routine, though she has a treadmill at home. She previously engaged in walking for exercise but has discontinued.    SLEEP:  She reports snoring and experiences daytime somnolence, particularly in afternoon hours.    MEDICAL HISTORY:  She has hypertension managed with Chlorthalidone and Irbesartan. She has rheumatoid arthritis treated with Folic Acid, Sulfasalazine, and Plaquenil, using Prednisone only during flares. Recent liver function tests showed abnormal results, leading to discontinuation of Methotrexate. She has history of prediabetes.      ROS:  Respiratory: +snoring  Neurological: +sleep disturbances        Weight management -    Dietary:   2 meals per day  Lunch: Orders out or gets lunch from the cafeteria. Polish food, japanese food. Sometimes salads.  Dinner: Cook at home. Pork chops, hamburgers. Greens, pasta, potatoes, rice.   Snacks: red hots, lemonheads  Drinking mostly water, sweet tea, sprite, or coke zero. Crystal light in water.  BMR: 1693 kcal/day  Goal calories per day: 1300 - 1500 kcal/day    Exercise:  Has a treadmill at home.   Target HR: 139  bpm    Previously taking tirzepatide for weight reduction, went up to 7.5 mg weekly.  Denies personal history of pancreatitis   Martha family history of thyroid cancer or MEN syndrome  No prior weight reduction surgeries.    Wt Readings from Last 10 Encounters:   25 113.6 kg (250 lb 7.1 oz)   24 112.2 kg (247 lb 5.7 oz)   24 113.9 kg (251 lb)   23 113.2 kg (249 lb 9 oz)   23 112.8 kg (248 lb 10.9 oz)   01/10/23 118.2 kg (260 lb 9.3 oz)   22 117 kg (257 lb 15 oz)   22 117.2 kg (258 lb 6.1 oz)   22 113.1 kg (249 lb 4.8 oz)   21 114.8 kg (253 lb 1.4 oz)     Lab Results   Component Value Date    HGBA1C 5.6 2024    HGBA1C 5.6 2023    HGBA1C 5.3 2023     Lab Results   Component Value Date    HDL 76 2025     Lab Results   Component Value Date    TRIG 59 2025       Metabolic syndrome:   Waist circumference   Prediabetes   HTN    HTN -   Currently prescribed: chlorthalidone, irbesartan  Due for micro albumin creatinine ratio.   STOP BAN/8  BP Readings from Last 5 Encounters:   24 110/68   24 136/84   23 (!) 147/63   23 124/86   01/10/23 117/72     Lab Results   Component Value Date    HGBA1C 5.6 2024    HGBA1C 5.6 2023    HGBA1C 5.3 2023     Elevated LFT's -   Lab Results   Component Value Date     (H) 2025     (H) 2025    ALKPHOS 64 2020     Lab Results   Component Value Date    FERRITIN 47 2020     Hepatitis B Surface Ag   Date Value Ref Range Status   10/29/2007 Negative  Final     Comment:     If further testing is needed, please contact the Blood Bank  within 3 days.       Current Outpatient Medications   Medication Instructions    chlorthalidone (HYGROTEN) 25 mg, Oral, Daily    folic acid (FOLVITE) 1 MG tablet TAKE 2 TABLETS(2000 MCG) BY MOUTH EVERY DAY    folic acid (FOLVITE) 1 MG tablet TAKE 2 TABLETS(2000 MCG) BY MOUTH EVERY DAY    hydroxychloroquine  (PLAQUENIL) 200 mg, Oral, 2 times daily    irbesartan (AVAPRO) 300 MG tablet TAKE 1 TABLET(300 MG) BY MOUTH DAILY    methotrexate 2.5 MG Tab TAKE 10 TABLETS BY MOUTH EVERY 7 DAYS    methotrexate 2.5 MG Tab TAKE 10 TABLETS BY MOUTH EVERY 7 DAYS    NIFEdipine (PROCARDIA-XL) 30 mg, Oral, Daily    predniSONE (DELTASONE) 10 MG tablet Take for rheumatoid arthritis flare and if no improvement in 7 days contact the office    sulfaSALAzine (AZULFIDINE) 500 mg Tab TAKE 3 TABLETS(1500 MG) BY MOUTH TWICE DAILY    WEGOVY 0.5 mg, Subcutaneous, Every 7 days     Objective:      Vitals:    04/23/25 0915   BP: 114/70   Pulse: 61   SpO2: 98%   Weight: 113.6 kg (250 lb 7.1 oz)   PainSc: 0-No pain     Body mass index is 44.36 kg/m².    Physical Exam  Vitals reviewed.   Constitutional:       General: She is not in acute distress.     Appearance: She is obese. She is not ill-appearing or diaphoretic.   Cardiovascular:      Rate and Rhythm: Normal rate and regular rhythm.      Heart sounds: Normal heart sounds. No murmur heard.     No friction rub. No gallop.   Pulmonary:      Effort: Pulmonary effort is normal. No respiratory distress.      Breath sounds: Normal breath sounds. No stridor. No wheezing, rhonchi or rales.   Skin:     General: Skin is warm and dry.      Comments: Color WNL   Neurological:      Mental Status: She is alert. Mental status is at baseline.   Psychiatric:         Mood and Affect: Mood normal.         Behavior: Behavior normal.         Assessment:       1. Class 3 drug-induced obesity with serious comorbidity and body mass index (BMI) of 40.0 to 44.9 in adult    2. Primary hypertension    3. Rheumatoid arthritis involving multiple sites with positive rheumatoid factor    4. Snoring        Plan:   Class 3 drug-induced obesity with serious comorbidity and body mass index (BMI) of 40.0 to 44.9 in adult  -     Discontinue: tirzepatide, weight loss, (ZEPBOUND) 2.5 mg/0.5 mL Soln; Inject 0.5 mLs (2.5 mg total) into the  skin every 7 days.  Dispense: 2 mL; Refill: 0  -     tirzepatide, weight loss, (ZEPBOUND) 2.5 mg/0.5 mL PnIj; Inject 2.5 mg into the skin every 7 days.  Dispense: 2 mL; Refill: 0    Primary hypertension  -     Ambulatory referral/consult to Sleep Disorders; Future; Expected date: 04/30/2025  -     tirzepatide, weight loss, (ZEPBOUND) 2.5 mg/0.5 mL PnIj; Inject 2.5 mg into the skin every 7 days.  Dispense: 2 mL; Refill: 0    Rheumatoid arthritis involving multiple sites with positive rheumatoid factor    Snoring  -     Ambulatory referral/consult to Sleep Disorders; Future; Expected date: 04/30/2025        Assessment & Plan    IMPRESSION:  - Assessed for weight reduction treatment, considering metabolic syndrome risk factors (prediabetes, HTN, obesity).  - Evaluated previous weight loss medication history, including tirzepatide use.  - Calculated caloric goal of 2312-3636 calories per day for weight reduction.  - Recommend GLP-1 agonist (tirzepatide/ZepBound) for weight loss, starting at 2.5 mg dose with planned titration.  - Considered sleep apnea as potential comorbidity.  - Reviewed recent thyroid function tests, which were normal.  - Noted recent ophthalmology visit for Plaquenil monitoring.    PLAN SUMMARY:  - Refer to sleep medicine for comprehensive evaluation  - Recently discontinued Methotrexate due to elevated liver enzymes  - Continue Folic Acid, Sulfasalazine, and Plaquenil for rheumatoid arthritis  - Continue Chlorthalidone and Irbesartan for hypertension  - Prescribe ZepBound 2.5 mg weekly injection for prediabetes  - Increase ZepBound to 5 mg after third dose if tolerating well  - Implement dietary changes: reduce simple carbohydrates, increase vegetables, limit sugary drinks  - Aim for 0422-6574 calories per day for weight reduction  - Engage in exercise activities to raise heart rate to approximately 139 bpm  - Contact office if not experiencing side effects after third dose to discuss dosage  increase    OBESITY, CLASS 3:  - Calculated patient's caloric goal for weight reduction to be approximately 1300 to 1500 calories per day.  - Evaluated current eating habits and assessed risk factors related to obesity, including prediabetes and hypertension, qualifying the patient for metabolic syndrome.  - Recommend dietary changes, including reducing intake of simple carbohydrates, increasing vegetable consumption, and limiting sugary drinks.  - Suggested exercise activities that raise the heart rate to approximately 139 bpm.    RHEUMATOID ARTHRITIS INVOLVING MULTIPLE SITES WITH POSITIVE RHEUMATOID FACTOR:  - Reviewed the patient's medications for rheumatoid arthritis, including Methotrexate, Folic Acid, Sulfasalazine, and Plaquenil.  - Noted that the patient's rheumatologist recently advised discontinuing Methotrexate due to elevated liver enzymes.  - Considered the impact of rheumatoid arthritis and its treatment on the patient's overall health and weight management plan.    ESSENTIAL HYPERTENSION:  - Monitored blood pressure, which was excellent during the visit.  - Continued current antihypertensive medications, including Chlorthalidone and Irbesartan.    RHEUMATOID ARTHRITIS:  - Noted Prednisone use only during flares.    OBSTRUCTIVE SLEEP APNEA EVALUATION:  - Evaluated patient for sleep apnea risk, scoring 6 out of 8 points on a pre-test.  - Discussed importance of diagnosing and treating sleep apnea due to associated health risks.  - Referred patient to sleep medicine for comprehensive evaluation.         38 minutes were spent in chart review, documentation and review of results, and evaluation, treatment, and counseling of patient on the same day of service.    Dayana Danielson MD  4/23/2025

## 2025-04-23 NOTE — PATIENT INSTRUCTIONS
Continue Zepbound 2.5 mg weekly.  If tolerating medication without side effects, contact office after 3rd injection and can increase to Zepbound 5 mg weekly.   Recommend the following:      Goal of at least 150 minutes aerobic exercise weekly. Recommend 30 mins, 5 days weekly.      Target heart rate while performing aerobic activity 139 bpm.       Monitoring caloric intake for 1-2 days weekly, with a goal of 1300 - 1500 kcal/day.       Organizing plate with half vegetables, quarter whole grain starches or starchy vegetables, and quarter lean protein.       Goal of at least 1 vegetarian meal weekly and emphasizing fresh vegetables and fruits in diet.       Weight loss goal of 1-2 lbs per week. Goal by next appt in 12 weeks is 15 lbs.        Increasing healthy based fats such as avocados, olive oil, nuts, and freshwater, cold-water fish.      Decreasing red meat to no more than once weekly.

## 2025-04-24 ENCOUNTER — PATIENT MESSAGE (OUTPATIENT)
Dept: RHEUMATOLOGY | Facility: CLINIC | Age: 55
End: 2025-04-24
Payer: COMMERCIAL

## 2025-04-30 ENCOUNTER — PATIENT MESSAGE (OUTPATIENT)
Dept: RHEUMATOLOGY | Facility: CLINIC | Age: 55
End: 2025-04-30
Payer: COMMERCIAL

## 2025-04-30 DIAGNOSIS — M05.79 RHEUMATOID ARTHRITIS INVOLVING MULTIPLE SITES WITH POSITIVE RHEUMATOID FACTOR: Primary | ICD-10-CM

## 2025-04-30 DIAGNOSIS — Z79.899 IMMUNOSUPPRESSION DUE TO DRUG THERAPY: ICD-10-CM

## 2025-04-30 DIAGNOSIS — D84.821 IMMUNOSUPPRESSION DUE TO DRUG THERAPY: ICD-10-CM

## 2025-04-30 DIAGNOSIS — R79.89 ELEVATED LFTS: ICD-10-CM

## 2025-05-25 ENCOUNTER — RESULTS FOLLOW-UP (OUTPATIENT)
Dept: RHEUMATOLOGY | Facility: HOSPITAL | Age: 55
End: 2025-05-25

## 2025-05-31 DIAGNOSIS — M05.79 RHEUMATOID ARTHRITIS INVOLVING MULTIPLE SITES WITH POSITIVE RHEUMATOID FACTOR: ICD-10-CM

## 2025-06-02 RX ORDER — METHOTREXATE 2.5 MG/1
TABLET ORAL
Qty: 128 TABLET | Refills: 0 | Status: SHIPPED | OUTPATIENT
Start: 2025-06-02

## 2025-06-28 PROBLEM — E88.810 METABOLIC SYNDROME: Status: ACTIVE | Noted: 2025-06-28

## 2025-07-24 ENCOUNTER — OFFICE VISIT (OUTPATIENT)
Dept: INTERNAL MEDICINE | Facility: CLINIC | Age: 55
End: 2025-07-24
Payer: COMMERCIAL

## 2025-07-24 DIAGNOSIS — E66.1 CLASS 3 DRUG-INDUCED OBESITY WITH SERIOUS COMORBIDITY AND BODY MASS INDEX (BMI) OF 40.0 TO 44.9 IN ADULT: Primary | ICD-10-CM

## 2025-07-24 DIAGNOSIS — E66.813 CLASS 3 DRUG-INDUCED OBESITY WITH SERIOUS COMORBIDITY AND BODY MASS INDEX (BMI) OF 40.0 TO 44.9 IN ADULT: Primary | ICD-10-CM

## 2025-07-24 PROCEDURE — 4010F ACE/ARB THERAPY RXD/TAKEN: CPT | Mod: CPTII,95,, | Performed by: STUDENT IN AN ORGANIZED HEALTH CARE EDUCATION/TRAINING PROGRAM

## 2025-07-24 PROCEDURE — 98005 SYNCH AUDIO-VIDEO EST LOW 20: CPT | Mod: 95,,, | Performed by: STUDENT IN AN ORGANIZED HEALTH CARE EDUCATION/TRAINING PROGRAM

## 2025-07-24 PROCEDURE — G2211 COMPLEX E/M VISIT ADD ON: HCPCS | Mod: 95,,, | Performed by: STUDENT IN AN ORGANIZED HEALTH CARE EDUCATION/TRAINING PROGRAM

## 2025-07-24 RX ORDER — SEMAGLUTIDE 0.25 MG/.5ML
0.25 INJECTION, SOLUTION SUBCUTANEOUS
Qty: 2 ML | Refills: 0 | Status: SHIPPED | OUTPATIENT
Start: 2025-07-24

## 2025-07-24 NOTE — PROGRESS NOTES
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 This includes time spent in discussion with the patient and time preparing for the patient appointment: review of tests, obtaining and/or reviewing separately obtained history, documenting clinical information in the electronic or other health record, independently interpreting results (not separately reported), and communicating results to the patient/family/caregiver or care coordination (not separately reported).     Each patient to whom he or she provides medical services by telemedicine is: (1) informed of the relationship between the physician and patient and the respective role of any other health care provider with respect to management of the patient; and (2) notified that he or she may decline to receive medical services by telemedicine and may withdraw from such care at any time.      Subjective:   Antonio Ruiz is a 55 y.o. female.    Patient is known to me, PCP is Jacqueline. Here today for the following:    History of Present Illness      WEIGHT MANAGEMENT:  She attempted weight management medication Zepbound at 5 mg dose but experienced no appetite suppression. Insurance has not approved the medication. She remains interested in exploring alternative options such as Wegovy. She reports monitoring caloric intake, maintaining 9853-0803 calories daily while incorporating occasional treats like red hots candy, balanced with more nutritionally dense options.    EXERCISE AND JOINT PAIN:  She has attended three water aerobics classes, though some have been cancelled due to weather. She reports ankle pain in the ankle following exercise, particularly after  jumping activities. The pain is self-limited and resolves within 1-2 days. Water aerobics is generally well-tolerated and she remains mindful not to overexert the affected leg.      ROS:  Musculoskeletal: +joint pain       Denies personal history of pancreatitis   Martha family history of thyroid cancer or MEN syndrome  No prior weight reduction surgeries.     HLD -  Lab Results   Component Value Date    CHOL 218 (H) 2025     Lab Results   Component Value Date    TRIG 59 2025     Lab Results   Component Value Date    LDLCALC 132 (H) 2025     Lab Results   Component Value Date    HDL 76 2025   The 10-year ASCVD risk score (Tai BYRD, et al., 2019) is: 3.2%     HTN -   Currently prescribed: chlorthalidone, irbesartan  Due for micro albumin creatinine ratio.   STOP BAN/8, has sleep med appointment scheduled next month  BP Readings from Last 5 Encounters:   25 114/70   24 110/68   24 136/84   23 (!) 147/63   23 124/86     Lab Results   Component Value Date    HGBA1C 5.6 2024    HGBA1C 5.6 2023    HGBA1C 5.3 2023     Elevated LFT's -   Lab Results   Component Value Date    AST 11 2025    ALT 20 2025    ALKPHOS 64 2020     Lab Results   Component Value Date    FERRITIN 47 2020     Hepatitis B Surface Ag   Date Value Ref Range Status   10/29/2007 Negative  Final     Comment:     If further testing is needed, please contact the [facility]  within 3 days.         Current Outpatient Medications   Medication Instructions    chlorthalidone (HYGROTEN) 25 mg, Oral, Daily    folic acid (FOLVITE) 1 MG tablet TAKE 2 TABLETS(2000 MCG) BY MOUTH EVERY DAY    folic acid (FOLVITE) 1 MG tablet TAKE 2 TABLETS(2000 MCG) BY MOUTH EVERY DAY    hydroxychloroquine (PLAQUENIL) 200 mg, Oral, 2 times daily    irbesartan (AVAPRO) 300 MG tablet TAKE 1 TABLET(300 MG) BY MOUTH DAILY    methotrexate 2.5 MG Tab TAKE 10 TABLETS BY MOUTH EVERY 7 DAYS     methotrexate 2.5 MG Tab TAKE 10 TABLETS BY MOUTH EVERY 7 DAYS    NIFEdipine (PROCARDIA-XL) 30 mg, Oral, Daily    predniSONE (DELTASONE) 10 MG tablet Take for rheumatoid arthritis flare and if no improvement in 7 days contact the office    sulfaSALAzine (AZULFIDINE) 500 mg Tab TAKE 3 TABLETS(1500 MG) BY MOUTH TWICE DAILY    ZEPBOUND 2.5 mg, Subcutaneous, Every 7 days       Objective:   There were no vitals filed for this visit.     Physical Exam  Constitutional:       General: She is not in acute distress.     Appearance: Normal appearance. She is not ill-appearing or diaphoretic.   Neurological:      Mental Status: She is alert.   Psychiatric:         Attention and Perception: Attention normal.         Mood and Affect: Mood and affect normal.         Speech: Speech normal.           Assessment/Plan:   Class 3 drug-induced obesity with serious comorbidity and body mass index (BMI) of 40.0 to 44.9 in adult  -     semaglutide, weight loss, (WEGOVY) 0.25 mg/0.5 mL PnIj; Inject 0.25 mg into the skin every 7 days.  Dispense: 2 mL; Refill: 0        Assessment & Plan      PLAN SUMMARY:  - Continue current treatment plan as liver function tests and blood pressure are normal  - Consider medications like Wegovy and Zepbound for weight management  - Continue water aerobics classes as tolerated  - Adjust meal choices to balance caloric intake when consuming less healthy foods  - Be gentle with affected leg and avoid overexertion to prevent soreness  - Recommend tracking calorie intake for better dietary balance    MORBID (SEVERE) OBESITY DUE TO EXCESS CALORIES:  - Monitored patient's engagement in water aerobics despite weather interruptions.  - Antonio is not tracking caloric intake but is aware of dietary habits, including consumption of Red Hots.  - Liver function tests have returned to normal, and blood pressure is in good range.  - Discussed the importance of balancing caloric intake by adjusting meal choices when  consuming less healthy foods, emphasizing nutritional balance without overly restricting food choices for sustainable weight management.  - Recommend tracking calories to maintain balance between occasional treats and nutritious, lower-calorie options.          Dayana Danielson MD      07/24/2025

## 2025-08-30 DIAGNOSIS — D84.821 IMMUNOSUPPRESSION DUE TO DRUG THERAPY: ICD-10-CM

## 2025-08-30 DIAGNOSIS — R76.8 POSITIVE ANA (ANTINUCLEAR ANTIBODY): ICD-10-CM

## 2025-08-30 DIAGNOSIS — R80.9 PROTEINURIA, UNSPECIFIED TYPE: ICD-10-CM

## 2025-08-30 DIAGNOSIS — R79.89 ELEVATED LFTS: ICD-10-CM

## 2025-08-30 DIAGNOSIS — R53.83 FATIGUE, UNSPECIFIED TYPE: ICD-10-CM

## 2025-08-30 DIAGNOSIS — M05.79 RHEUMATOID ARTHRITIS INVOLVING MULTIPLE SITES WITH POSITIVE RHEUMATOID FACTOR: Primary | ICD-10-CM

## 2025-08-30 DIAGNOSIS — Z79.899 IMMUNOSUPPRESSION DUE TO DRUG THERAPY: ICD-10-CM

## 2025-09-02 RX ORDER — METHOTREXATE 2.5 MG/1
TABLET ORAL
Qty: 128 TABLET | Refills: 0 | Status: SHIPPED | OUTPATIENT
Start: 2025-09-02

## 2025-09-04 DIAGNOSIS — Z79.899 IMMUNOSUPPRESSION DUE TO DRUG THERAPY: Primary | ICD-10-CM

## 2025-09-04 DIAGNOSIS — M05.79 RHEUMATOID ARTHRITIS INVOLVING MULTIPLE SITES WITH POSITIVE RHEUMATOID FACTOR: ICD-10-CM

## 2025-09-04 DIAGNOSIS — R53.83 FATIGUE, UNSPECIFIED TYPE: ICD-10-CM

## 2025-09-04 DIAGNOSIS — D84.821 IMMUNOSUPPRESSION DUE TO DRUG THERAPY: Primary | ICD-10-CM

## 2025-09-05 RX ORDER — SULFASALAZINE 500 MG/1
TABLET ORAL
Qty: 540 TABLET | Refills: 0 | Status: SHIPPED | OUTPATIENT
Start: 2025-09-05

## 2025-09-05 RX ORDER — FOLIC ACID 1 MG/1
TABLET ORAL
Qty: 180 TABLET | Refills: 2 | Status: SHIPPED | OUTPATIENT
Start: 2025-09-05